# Patient Record
Sex: FEMALE | Race: BLACK OR AFRICAN AMERICAN | Employment: UNEMPLOYED | URBAN - METROPOLITAN AREA
[De-identification: names, ages, dates, MRNs, and addresses within clinical notes are randomized per-mention and may not be internally consistent; named-entity substitution may affect disease eponyms.]

---

## 2017-02-20 ENCOUNTER — HOSPITAL ENCOUNTER (OUTPATIENT)
Dept: LAB | Age: 62
Discharge: HOME | End: 2017-02-20
Payer: MEDICARE

## 2017-02-20 ENCOUNTER — OFFICE VISIT (OUTPATIENT)
Dept: FAMILY MEDICINE CLINIC | Age: 62
End: 2017-02-20

## 2017-02-20 VITALS
OXYGEN SATURATION: 98 % | DIASTOLIC BLOOD PRESSURE: 108 MMHG | TEMPERATURE: 98.2 F | HEART RATE: 78 BPM | RESPIRATION RATE: 16 BRPM | HEIGHT: 55 IN | SYSTOLIC BLOOD PRESSURE: 158 MMHG

## 2017-02-20 DIAGNOSIS — E78.5 DYSLIPIDEMIA: Chronic | ICD-10-CM

## 2017-02-20 DIAGNOSIS — I69.359 CVA, OLD, HEMIPARESIS (HCC): ICD-10-CM

## 2017-02-20 DIAGNOSIS — Z12.31 ENCOUNTER FOR MAMMOGRAM TO ESTABLISH BASELINE MAMMOGRAM: ICD-10-CM

## 2017-02-20 DIAGNOSIS — E08.9 DIABETES MELLITUS DUE TO UNDERLYING CONDITION WITHOUT COMPLICATION, UNSPECIFIED LONG TERM INSULIN USE STATUS: ICD-10-CM

## 2017-02-20 DIAGNOSIS — Z00.00 ROUTINE GENERAL MEDICAL EXAMINATION AT A HEALTH CARE FACILITY: Primary | ICD-10-CM

## 2017-02-20 DIAGNOSIS — Z13.6 SCREENING FOR CARDIOVASCULAR CONDITION: ICD-10-CM

## 2017-02-20 PROBLEM — Z71.89 ADVANCED CARE PLANNING/COUNSELING DISCUSSION: Status: ACTIVE | Noted: 2017-02-20

## 2017-02-20 LAB
ALT SERPL-CCNC: 15 U/L (ref 13–56)
ANION GAP BLD CALC-SCNC: 8 MMOL/L (ref 3–18)
AST SERPL W P-5'-P-CCNC: 9 U/L (ref 15–37)
BUN SERPL-MCNC: 12 MG/DL (ref 7–18)
BUN/CREAT SERPL: 18 (ref 12–20)
CALCIUM SERPL-MCNC: 9.4 MG/DL (ref 8.5–10.1)
CHLORIDE SERPL-SCNC: 104 MMOL/L (ref 100–108)
CHOLEST SERPL-MCNC: 236 MG/DL
CO2 SERPL-SCNC: 29 MMOL/L (ref 21–32)
CREAT SERPL-MCNC: 0.65 MG/DL (ref 0.6–1.3)
EST. AVERAGE GLUCOSE BLD GHB EST-MCNC: 137 MG/DL
GLUCOSE SERPL-MCNC: 103 MG/DL (ref 74–99)
HBA1C MFR BLD: 6.4 % (ref 4.2–5.6)
HDLC SERPL-MCNC: 83 MG/DL (ref 40–60)
HDLC SERPL: 2.8 {RATIO} (ref 0–5)
LDLC SERPL CALC-MCNC: 136.2 MG/DL (ref 0–100)
LIPID PROFILE,FLP: ABNORMAL
POTASSIUM SERPL-SCNC: 4.1 MMOL/L (ref 3.5–5.5)
SODIUM SERPL-SCNC: 141 MMOL/L (ref 136–145)
TRIGL SERPL-MCNC: 84 MG/DL (ref ?–150)
VLDLC SERPL CALC-MCNC: 16.8 MG/DL

## 2017-02-20 PROCEDURE — 80048 BASIC METABOLIC PNL TOTAL CA: CPT | Performed by: FAMILY MEDICINE

## 2017-02-20 PROCEDURE — 80061 LIPID PANEL: CPT | Performed by: FAMILY MEDICINE

## 2017-02-20 PROCEDURE — 36415 COLL VENOUS BLD VENIPUNCTURE: CPT | Performed by: FAMILY MEDICINE

## 2017-02-20 PROCEDURE — 84460 ALANINE AMINO (ALT) (SGPT): CPT | Performed by: FAMILY MEDICINE

## 2017-02-20 PROCEDURE — 84450 TRANSFERASE (AST) (SGOT): CPT | Performed by: FAMILY MEDICINE

## 2017-02-20 PROCEDURE — 83036 HEMOGLOBIN GLYCOSYLATED A1C: CPT | Performed by: FAMILY MEDICINE

## 2017-02-20 RX ORDER — MUPIROCIN CALCIUM 20 MG/G
CREAM TOPICAL 2 TIMES DAILY
Qty: 15 G | Refills: 1 | Status: SHIPPED | OUTPATIENT
Start: 2017-02-20

## 2017-02-20 RX ORDER — TRAMADOL HYDROCHLORIDE 50 MG/1
50 TABLET ORAL
Qty: 30 TAB | Refills: 3 | Status: SHIPPED | OUTPATIENT
Start: 2017-02-20 | End: 2017-09-13 | Stop reason: SDUPTHER

## 2017-02-20 RX ORDER — METOPROLOL SUCCINATE 50 MG/1
50 TABLET, EXTENDED RELEASE ORAL DAILY
Qty: 30 TAB | Refills: 6 | Status: SHIPPED | OUTPATIENT
Start: 2017-02-20 | End: 2017-04-05 | Stop reason: SDUPTHER

## 2017-02-20 NOTE — PROGRESS NOTES
1. Have you been to the ER, urgent care clinic since your last visit? Hospitalized since your last visit? No    2. Have you seen or consulted any other health care providers outside of the [address] [place] [address] since your last visit? Include any pap smears or colon screening.  No

## 2017-02-20 NOTE — ACP (ADVANCE CARE PLANNING)
Advance Care Planning (ACP) Provider Conversation Snapshot    Date of ACP Conversation: 02/20/17  Persons included in Conversation:  patient  Length of ACP Conversation in minutes:  <16 minutes (Non-Billable)    Authorized Decision Maker (if patient is incapable of making informed decisions):    This person is:   NA          For Patients with Decision Making Capacity:   MELLY BARNARD    Conversation Outcomes / Follow-Up Plan:   Recommended completion of Advance Directive form after review of ACP materials and conversation with prospective healthcare agent   Recommended communicating the plan and making copies for the healthcare agent, personal physician, and others as appropriate (e.g., health system)

## 2017-02-20 NOTE — PATIENT INSTRUCTIONS
Medicare Part B Preventive Services Limitations Recommendation Scheduled   Bone Mass Measurement  (age 72 & older, biennial) Requires diagnosis related to osteoporosis or estrogen deficiency. Biennial benefit unless patient has history of long-term glucocorticoid tx or baseline is needed because initial test was by other method Every 2 years or more frequently if medically necessary. NA         Cardiovascular Screening Blood Tests (every 5 years)  Total cholesterol, HDL, Triglycerides Order as a panel if possible Every 5 years. Done today           Colorectal Cancer Screening  -Fecal occult blood test (annual)  -Flexible sigmoidoscopy (5y)  -Screening colonoscopy (10y)  -Barium Enema Colorectal cancer screening, ages 54-65. For all patients 48 and older:  -Annual fecal occult blood test or  colonoscopy every 10 years or  -Flexible sigmoidoscopy every 5 years or  -lower endoscopy to be performed more frequently, if advised by GI. Scheduled today           Counseling to Prevent Tobacco Use (up to 8 sessions per year)  - Counseling greater than 3 and up to 10 minutes  - Counseling greater than 10 minutes Patients must be asymptomatic of tobacco-related conditions to receive as preventive service Two cessation counseling attempts (up to 8 counseling sessions) per year. Advised to quit;     Diabetes Screening Tests (at least every 3 years, Medicare covers annually or at 6-month intervals for prediabetic patients)    Fasting blood sugar (FBS) or glucose tolerance test (GTT) Patient must be diagnosed with one of the following:  -Hypertension, Dyslipidemia, obesity, previous impaired FBS or GTT  Or any two of the following: overweight, FH of diabetes, age ? 72, history of gestational diabetes, birth of baby weighing more than 9 pounds Annually or every 6 months if previous diagnosis of elevated FBS, elevated HbA1c, or impaired GTT, or glucosuria.  Done: today           Diabetes Self-Management Training (DSMT) (no USPSTF recommendation) Requires referral by treating physician for patient with diabetes or renal disease. 10 hours of initial DSMT session of no less than 30 minutes each in a continuous 12-month period. 2 hours of follow-up DSMT in subsequent years. Up to 10 hours of initial training within a continuous 12 month period of subsequent years: up to 2 hours of follow-up training each year after the initial year. NA   Glaucoma Screening (no USPSTF recommendation) Diabetes mellitus, family history, , age 48 or over,  American, age 72 or over Annually for covered beneficiaries. Done:7/2016; due 7/2017           Human Immunodeficiency Virus (HIV) Screening (annually for increased risk patients)  HIV-1 and HIV-2 by EIA, DAVID, rapid antibody test, or oral mucosa transudate Patient must be at increased risk for HIV infection per USPSTF guidelines or pregnant. Tests covered annually for patients at increased risk. Pregnant patients may receive up to 3 test during pregnancy. Annually for beneficiaries at increased risk, including anyone who asks for the test. NA   Medical Nutrition Therapy (MNT) (for diabetes or renal disease not recommended schedule) Requires referral by treating physician for patient with diabetes or renal disease. Can be provided in same year as diabetes self-management training (DSMT), and CMS recommends medical nutrition therapy take place after DSMT. Up to 3 hours for initial year and 2 hours in subsequent years. First year: 3 hours of one-on-one counseling or subsequent years: 2 hours.  NA   Prostate Cancer Screening (annually up to age 76)  - Digital rectal exam (YOGI)  - Prostate specific antigen (PSA) Annually (age 48 or over), YOGI not paid separately when covered E/M service is provided on same date Once every 12 months for patients age older than 48years of age includes: digital rectal exam and/or prostate specific antigen test. Done: NA         Seasonal Influenza Vaccination (annually)  Once per fall or winter season. Done:    10/2016       Pneumococcal Vaccination (once after 72)  Once after age 72 and if more than 5 years since last vaccination and/or uncertainty of vaccine status. Pneumococcal:      Prevnar 13: 5/2016   Hepatitis B Vaccinations (if medium/high risk) Medium/high risk factors:  End-stage renal disease,  Hemophiliacs who received Factor VIII or IX concentrates, Clients of institutions for the mentally retarded, Persons who live in the same house as a HepB virus carrier, Homosexual men, Illicit injectable drug abusers. Schedule course of vaccines if patient not previously vaccinated  *additional shots if medically necessary. NA   Screening Mammography (biennial age 54-69)? Annually (age 36 or over) Age 28 through 44: one baseline or aged 36 and older: annually. Done:    Ordered today           Screening Pap Tests and Pelvic Examination (up to age 79 and after 79 if unknown history or abnormal study last 10 years) Every 24 months except high risk Annually if at high risk for developing cervical or vaginal cancer, or childbearing, age with abnormal Pap test within past 3 years or every 2 years for women at normal risk. Done: 2015;  Due 2018           Ultrasound Screening for Abdominal Aortic Aneurysm (AAA) (once) Patient must be referred through [facility] and not have had a screening for abdominal aortic aneurysm before under Medicare. Limited to patients who meet one of the following criteria:  - Men who are 73-68 years old and have smoked more than 100 cigarettes in their lifetime.  -Anyone with a FH of AAA  -Anyone recommended for screening by USPSTF Once in a lifetime. Well Visit, Women 48 to 72: Care Instructions  Your Care Instructions  Physical exams can help you stay healthy. Your doctor has checked your overall health and may have suggested ways to take good care of yourself. He or she also may have recommended tests.  At home, you can help prevent illness with healthy eating, regular exercise, and other steps. Follow-up care is a key part of your treatment and safety. Be sure to make and go to all appointments, and call your doctor if you are having problems. It's also a good idea to know your test results and keep a list of the medicines you take. How can you care for yourself at home? · Reach and stay at a healthy weight. This will lower your risk for many problems, such as obesity, diabetes, heart disease, and high blood pressure. · Get at least 30 minutes of exercise on most days of the week. Walking is a good choice. You also may want to do other activities, such as running, swimming, cycling, or playing tennis or team sports. · Do not smoke. Smoking can make health problems worse. If you need help quitting, talk to your doctor about stop-smoking programs and medicines. These can increase your chances of quitting for good. · Protect your skin from too much sun. When you're outdoors from 10 a.m. to 4 p.m., stay in the shade or cover up with clothing and a hat with a wide brim. Wear sunglasses that block UV rays. Even when it's cloudy, put broad-spectrum sunscreen (SPF 30 or higher) on any exposed skin. · See a dentist one or two times a year for checkups and to have your teeth cleaned. · Wear a seat belt in the car. · Limit alcohol to 1 drink a day. Too much alcohol can cause health problems. Follow your doctor's advice about when to have certain tests. These tests can spot problems early. · Cholesterol. Your doctor will tell you how often to have this done based on your age, family history, or other things that can increase your risk for heart attack and stroke. · Blood pressure. Have your blood pressure checked during a routine doctor visit. Your doctor will tell you how often to check your blood pressure based on your age, your blood pressure results, and other factors.   · Mammogram. Ask your doctor how often you should have a mammogram, which is an X-ray of your breasts. A mammogram can spot breast cancer before it can be felt and when it is easiest to treat. · Pap test and pelvic exam. Ask your doctor how often you should have a Pap test. You may not need to have a Pap test as often as you used to. · Vision. Have your eyes checked every year or two or as often as your doctor suggests. Some experts recommend that you have yearly exams for glaucoma and other age-related eye problems starting at age 48. · Hearing. Tell your doctor if you notice any change in your hearing. You can have tests to find out how well you hear. · Diabetes. Ask your doctor whether you should have tests for diabetes. · Colon cancer. You should begin tests for colon cancer at age 48. You may have one of several tests. Your doctor will tell you how often to have tests based on your age and risk. Risks include whether you already had a precancerous polyp removed from your colon or whether your parents, sisters and brothers, or children have had colon cancer. · Thyroid disease. Talk to your doctor about whether to have your thyroid checked as part of a regular physical exam. Women have an increased chance of a thyroid problem. · Osteoporosis. You should begin tests for bone density at age 72. If you are younger than 72, ask your doctor whether you have factors that may increase your risk for this disease. You may want to have this test before age 72. · Heart attack and stroke risk. At least every 4 to 6 years, you should have your risk for heart attack and stroke assessed. Your doctor uses factors such as your age, blood pressure, cholesterol, and whether you smoke or have diabetes to show what your risk for a heart attack or stroke is over the next 10 years. When should you call for help? Watch closely for changes in your health, and be sure to contact your doctor if you have any problems or symptoms that concern you. Where can you learn more?   Go to http://aspen-lesa.info/. Enter E607 in the search box to learn more about \"Well Visit, Women 50 to 72: Care Instructions. \"  Current as of: July 19, 2016  Content Version: 11.1  © 9115-1715 [employer]. Care instructions adapted under license by [employer] (which disclaims liability or warranty for this information). If you have questions about a medical condition or this instruction, always ask your healthcare professional. [place] [address] any warranty or liability for your use of this information.

## 2017-02-20 NOTE — PROGRESS NOTES
This is an Initial Medicare Annual Wellness Exam (AWV) (Performed 12 months after IPPE or effective date of Medicare Part B enrollment, Once in a lifetime)    I have reviewed the patient's medical history in detail and updated the computerized patient record. Her BP is very elevated today. She has taken her BP meds. She is fasting. She is planning on losing weight. History     Past Medical History   Diagnosis Date    Arthritis     CVA (cerebral vascular accident) ([facility] [address] 11/2010     Left side paralysis    Diabetes ([facility] [address]     Dyslipidemia 11/27/2012    Hypertension     Osteoarthritis 11/1/2012    Overactive bladder 12/13/2012    Psychiatric disorder      Anxiety    Type 2 diabetes mellitus ([facility] [address] 9/12/2012      Past Surgical History   Procedure Laterality Date    Hx dilation and curettage       Current Outpatient Prescriptions   Medication Sig Dispense Refill    mupirocin calcium (BACTROBAN) 2 % topical cream Apply  to affected area two (2) times a day. 15 g 1    traMADol (ULTRAM) 50 mg tablet Take 1 Tab by mouth every six (6) hours as needed for Pain. Max Daily Amount: 200 mg. 30 Tab 3    metoprolol succinate (TOPROL-XL) 50 mg XL tablet Take 1 Tab by mouth daily. 30 Tab 6    glucose blood VI test strips (ACCU-CHEK SMARTVIEW TEST STRIP) strip Check glucose once daily Diabetes Mellitus Type 2 E11.9 100 Strip 3    Blood-Glucose Meter (ACCU-CHEK KATHYA) misc Check glucose once daily. Please dispense Accu-check Kathya Smartview Meter Diabetes Mellitus Type 2 E11.9 1 Each 1    Lancets (ACCU-CHEK FASTCLIX) misc Check glucose once daily Diabetes Mellitus Type 2 E11.9 100 Each 3    alcohol swabs (BD SINGLE USE SWABS REGULAR) padm Check glucose once daily. Diabetes Mellitus Type 2 E11.9 100 Pad 3    losartan-hydrochlorothiazide (HYZAAR) 100-25 mg per tablet Take 1 Tab by mouth daily.  30 Tab 6    atorvastatin (LIPITOR) 80 mg tablet TAKE ONE TABLET EVERY NIGHT AT BEDTIME 30 Tab 5    aspirin delayed-release 81 mg tablet TAKE ONE TABLET EVERY DAY 30 Tab 5    metFORMIN (GLUCOPHAGE) 500 mg tablet TAKE ONE TABLET EVERY DAY WITH BREAKFAST 30 Tab 5    tolterodine (DETROL) 2 mg tablet TAKE ONE TABLET TWO TIMES A DAY 60 Tab 5    PARoxetine (PAXIL) 20 mg tablet TAKE ONE TABLET EVERY DAY 30 Tab 6    Blood-Glucose Meter monitoring kit Blood sugar test daily 1 Kit 0    glucose blood VI test strips (BLOOD GLUCOSE TEST) strip Blood sugar test daily. 100 Strip 3    amLODIPine (NORVASC) 10 mg tablet TAKE ONE TABLET EVERY DAY 30 Tab 6    Lancets misc Blood sugar test daily 1 Each 11    mometasone (NASONEX) 50 mcg/actuation nasal spray 2 sprays by Both Nostrils route daily. 1 Container 3    albuterol (PROVENTIL HFA, VENTOLIN HFA) 90 mcg/actuation inhaler Take 2 Puffs by inhalation every four (4) hours as needed for Wheezing. 1 Inhaler 3    magnesium oxide (MAG-OXIDE) 400 mg tablet Take 1 Tab by mouth two (2) times a day. 15 Tab 0    acetaminophen (TYLENOL EXTRA STRENGTH) 500 mg tablet Take 1 Tab by mouth every six (6) hours as needed for Pain.  30 Tab 4     Allergies   Allergen Reactions    Other Food Other (comments)     Seafood - intolerance    Penicillins Diarrhea     Family History   Problem Relation Age of Onset    Diabetes Mother     Diabetes Sister      Social History   Substance Use Topics    Smoking status: Current Some Day Smoker     Types: Cigarettes     Last attempt to quit: 5/15/2016    Smokeless tobacco: Never Used    Alcohol use No     Patient Active Problem List   Diagnosis Code    Type 2 diabetes mellitus ([facility] [address] E11.9    Hypertension I10    Osteoarthritis M19.90    CVA (cerebrovascular accident) ([facility] [address] I63.9    Dyslipidemia E78.5    Depression F32.9    Overactive bladder N32.81    Diabetes mellitus due to underlying condition without complications ([facility] [address] M82.7    Advanced care planning/counseling discussion Z71.89         Depression Risk Factor Screening:   No flowsheet data found. Alcohol Risk Factor Screening: On any occasion during the past 3 months, have you had more than 3 drinks containing alcohol? No    Do you average more than 7 drinks per week? No    Functional Ability and Level of Safety:     Hearing Loss   none    Activities of Daily Living   Partial assistance. Requires assistance with: bathing and hygiene, grooming and dressing    Fall Risk   No flowsheet data found. Abuse Screen   Patient is not abused    Review of Systems   A comprehensive review of systems was negative except for that written in the HPI. Physical Examination     No exam data present    Evaluation of Cognitive Function:  Mood/affect:  neutral  Appearance: age appropriate  Family member/caregiver input: none      PE:  Visit Vitals    BP (!) 158/108    Pulse 78    Temp 98.2 °F (36.8 °C) (Oral)    Resp 16    Ht 4' 1\" (1.245 m)    SpO2 98%     General appearance: alert, cooperative, no distress, appears stated age  Neck: supple, symmetrical, trachea midline, no adenopathy, thyroid: not enlarged, symmetric, no tenderness/mass/nodules, no carotid bruit and no JVD  Lungs: clear to auscultation bilaterally  Heart: regular rate and rhythm, S1, S2 normal, no murmur, click, rub or gallop  Extremities: extremities normal, atraumatic, no cyanosis or edema      Patient Care Team:  Altagracia Camacho MD as PCP - General (Family Practice)  Femi Cuevas RN as Ambulatory Care Navigator    Advice/Referrals/Counseling   Education and counseling provided:  Are appropriate based on today's review and evaluation  End-of-Life planning (with patient's consent)  Screening Mammography  Cardiovascular screening blood test    Assessment/Plan       ICD-10-CM ICD-9-CM    1. Routine general medical examination at a health care facility Z00.00 V70.0    2. Screening for cardiovascular condition Z13.6 V81.2    3. Encounter for mammogram to establish baseline mammogram Z12.31 V76.12 NISH MAMMO BI SCREENING INCL CAD   4. CVA, old, hemiparesis ([facility] [address] I69.359 438.20 REFERRAL TO PHYSICAL THERAPY   . As above,   above all stable unless otherwise noted  Labs as ordered  Continue current meds as ordered  Follow-up Disposition:  Return in about 6 weeks (around 4/3/2017) for BP/toprol xl. An After Visit Summary was printed and given to the patient. This has been fully explained to the patient, who indicates understanding. Get ophthalmology records  Advanced care plan packet given  Add toprol  Per pt request at the end of the visit a PT consult was placed for pts left hemiparesis.

## 2017-02-20 NOTE — PROGRESS NOTES
Due to labs printing off on separate requisitions, orders have been entered per verbal order of Dr. Genna Coughlin  BMP, Lipid Panel, AST, ALT and Hemoglobin A1c.  Will cancel orders from 10/19/2016

## 2017-02-20 NOTE — MR AVS SNAPSHOT
Visit Information Date & Time Provider Department Dept. Phone Encounter #  
 2/20/2017 10:00 AM Josep Price, [facility] [id number] Follow-up Instructions Return in about 6 weeks (around 4/3/2017) for BP/toprol xl. Upcoming Health Maintenance Date Due  
 BREAST CANCER SCRN MAMMOGRAM 6/21/2017* COLONOSCOPY 6/21/2017* EYE EXAM RETINAL OR DILATED Q1 6/22/2017* ZOSTER VACCINE AGE 60> 6/28/2017* LIPID PANEL Q1 5/19/2017 HEMOGLOBIN A1C Q6M 8/20/2017 FOOT EXAM Q1 2/20/2018 MICROALBUMIN Q1 2/20/2018 PAP AKA CERVICAL CYTOLOGY 5/29/2018 DTaP/Tdap/Td series (2 - Td) 5/19/2026 *Topic was postponed. The date shown is not the original due date. Allergies as of 2/20/2017  Review Complete On: 2/20/2017 By: Josep Price MD  
  
 Severity Noted Reaction Type Reactions Other Food  10/29/2013    Other (comments) Seafood - intolerance Penicillins  07/02/2013    Diarrhea Current Immunizations  Reviewed on 2/20/2017 Name Date Influenza Vaccine 10/19/2015 Influenza Vaccine (Quad) PF 10/19/2016 Pneumococcal Conjugate (PCV-13) 5/19/2016 TB Skin Test (PPD) Intradermal 9/9/2013 10:40 AM, 9/3/2013  3:35 PM  
 Tdap 5/19/2016 Reviewed by Josep Price MD on 2/20/2017 at 11:05 AM  
You Were Diagnosed With   
  
 Codes Comments Routine general medical examination at a health care facility    -  Primary ICD-10-CM: Z00.00 ICD-9-CM: V70.0 Screening for cardiovascular condition     ICD-10-CM: Z13.6 ICD-9-CM: V81.2 Encounter for mammogram to establish baseline mammogram     ICD-10-CM: Z12.31 
ICD-9-CM: V76.12   
 CVA, old, hemiparesis ([facility] [address]     ICD-10-CM: K77.060 ICD-9-CM: 438.20 Vitals BP Pulse Temp Resp Height(growth percentile) SpO2  
 (!) 158/108 78 98.2 °F (36.8 °C) (Oral) 16 4' 1\" (1.245 m) 98% OB Status Smoking Status Postmenopausal Current Some Day Smoker Vitals History Preferred Pharmacy Pharmacy Name Phone [facility] [address], [address] [phone] Your Updated Medication List  
  
   
This list is accurate as of: 2/20/17 11:10 AM.  Always use your most recent med list.  
  
  
  
  
 acetaminophen 500 mg tablet Commonly known as:  [address] Brandt Jr Sabrina Byrnes Take 1 Tab by mouth every six (6) hours as needed for Pain. albuterol 90 mcg/actuation inhaler Commonly known as:  PROVENTIL HFA, VENTOLIN HFA, PROAIR HFA Take 2 Puffs by inhalation every four (4) hours as needed for Wheezing. alcohol swabs Padm Commonly known as:  BD Single Use Swabs Regular Check glucose once daily. Diabetes Mellitus Type 2 E11.9  
  
 amLODIPine 10 mg tablet Commonly known as:  Elyn Coffer TAKE ONE TABLET EVERY DAY  
  
 aspirin delayed-release 81 mg tablet TAKE ONE TABLET EVERY DAY  
  
 atorvastatin 80 mg tablet Commonly known as:  LIPITOR  
TAKE ONE TABLET EVERY NIGHT AT BEDTIME * Blood-Glucose Meter monitoring kit Blood sugar test daily * Blood-Glucose Meter Misc Commonly known as:  Esperanza Rajan Check glucose once daily. Please dispense Accu-check Renetta Smartview Meter Diabetes Mellitus Type 2 E11.9  
  
 * glucose blood VI test strips strip Commonly known as:  blood glucose test  
Blood sugar test daily. * glucose blood VI test strips strip Commonly known as:  [address] Check glucose once daily Diabetes Mellitus Type 2 E11.9 * Lancets Misc Blood sugar test daily * Lancets Misc Commonly known as:  ACCU-CHEK FASTCLIX Check glucose once daily Diabetes Mellitus Type 2 E11.9  
  
 losartan-hydroCHLOROthiazide 100-25 mg per tablet Commonly known as:  HYZAAR Take 1 Tab by mouth daily. magnesium oxide 400 mg tablet Commonly known as:  MAG-OXIDE Take 1 Tab by mouth two (2) times a day. metFORMIN 500 mg tablet Commonly known as:  GLUCOPHAGE  
TAKE ONE TABLET EVERY DAY WITH BREAKFAST  
  
 metoprolol succinate 50 mg XL tablet Commonly known as:  TOPROL-XL Take 1 Tab by mouth daily. mometasone 50 mcg/actuation nasal spray Commonly known as:  NASONEX  
2 sprays by Both Nostrils route daily. mupirocin calcium 2 % topical cream  
Commonly known as:  [facility] Apply  to affected area two (2) times a day. PARoxetine 20 mg tablet Commonly known as:  PAXIL TAKE ONE TABLET EVERY DAY  
  
 tolterodine 2 mg tablet Commonly known as:  DETROL  
TAKE ONE TABLET TWO TIMES A DAY  
  
 traMADol 50 mg tablet Commonly known as:  ULTRAM  
Take 1 Tab by mouth every six (6) hours as needed for Pain. Max Daily Amount: 200 mg.  
  
 * Notice: This list has 6 medication(s) that are the same as other medications prescribed for you. Read the directions carefully, and ask your doctor or other care provider to review them with you. Prescriptions Printed Refills  
 traMADol (ULTRAM) 50 mg tablet 3 Sig: Take 1 Tab by mouth every six (6) hours as needed for Pain. Max Daily Amount: 200 mg. Class: Print Route: Oral  
  
Prescriptions Sent to Pharmacy Refills  
 mupirocin calcium (BACTROBAN) 2 % topical cream 1 Sig: Apply  to affected area two (2) times a day. Class: Normal  
 Pharmacy: [facility] [address] [place] [address] [place]  Ph #: [phone] Route: Topical  
 metoprolol succinate (TOPROL-XL) 50 mg XL tablet 6 Sig: Take 1 Tab by mouth daily. Class: Normal  
 Pharmacy: [facility] [address] [place] [address] [place]  Ph #: [phone] Route: Oral  
  
We Performed the Following REFERRAL TO PHYSICAL THERAPY [MGI97 Custom] Comments:  
 Please evaluate patient for  left sided hemiparesis - s/p stroke. Follow-up Instructions Return in about 6 weeks (around 4/3/2017) for BP/toprol xl.   
  
To-Do List   
 02/20/2017 Imaging:  [facility] MAMMO BI SCREENING INCL CAD Referral Information Referral ID Referred By Referred To  
  
 [id number] [facility] [address], [address] [place], [address] Phone: [phone] Visits Status Start Date End Date 1 New Request 2/20/17 2/20/18 If your referral has a status of pending review or denied, additional information will be sent to support the outcome of this decision. Patient Instructions Medicare Part B Preventive Services Limitations Recommendation Scheduled Bone Mass Measurement 
(age 72 & older, biennial) Requires diagnosis related to osteoporosis or estrogen deficiency. Biennial benefit unless patient has history of long-term glucocorticoid tx or baseline is needed because initial test was by other method Every 2 years or more frequently if medically necessary. NA Cardiovascular Screening Blood Tests (every 5 years) Total cholesterol, HDL, Triglycerides Order as a panel if possible Every 5 years. Done today Colorectal Cancer Screening 
-Fecal occult blood test (annual) -Flexible sigmoidoscopy (5y) 
-Screening colonoscopy (10y) -Barium Enema Colorectal cancer screening, ages 54-65. For all patients 48 and older: 
-Annual fecal occult blood test or 
colonoscopy every 10 years or 
-Flexible sigmoidoscopy every 5 years or 
-lower endoscopy to be performed more frequently, if advised by GI. Scheduled today Counseling to Prevent Tobacco Use (up to 8 sessions per year) - Counseling greater than 3 and up to 10 minutes - Counseling greater than 10 minutes Patients must be asymptomatic of tobacco-related conditions to receive as preventive service Two cessation counseling attempts (up to 8 counseling sessions) per year.  Advised to quit;    
Diabetes Screening Tests (at least every 3 years, Medicare covers annually or at 6-month intervals for prediabetic patients) Fasting blood sugar (FBS) or glucose tolerance test (GTT) Patient must be diagnosed with one of the following: 
-Hypertension, Dyslipidemia, obesity, previous impaired FBS or GTT 
Or any two of the following: overweight, FH of diabetes, age ? 72, history of gestational diabetes, birth of baby weighing more than 9 pounds Annually or every 6 months if previous diagnosis of elevated FBS, elevated HbA1c, or impaired GTT, or glucosuria. Done: today Diabetes Self-Management Training (DSMT) (no USPSTF recommendation) Requires referral by treating physician for patient with diabetes or renal disease. 10 hours of initial DSMT session of no less than 30 minutes each in a continuous 12-month period. 2 hours of follow-up DSMT in subsequent years. Up to 10 hours of initial training within a continuous 12 month period of subsequent years: up to 2 hours of follow-up training each year after the initial year. NA Glaucoma Screening (no USPSTF recommendation) Diabetes mellitus, family history, , age 48 or over,  American, age 72 or over Annually for covered beneficiaries. Done:7/2016; due 7/2017 Human Immunodeficiency Virus (HIV) Screening (annually for increased risk patients) HIV-1 and HIV-2 by EIA, DAVID, rapid antibody test, or oral mucosa transudate Patient must be at increased risk for HIV infection per USPSTF guidelines or pregnant. Tests covered annually for patients at increased risk. Pregnant patients may receive up to 3 test during pregnancy. Annually for beneficiaries at increased risk, including anyone who asks for the test. NA Medical Nutrition Therapy (MNT) (for diabetes or renal disease not recommended schedule) Requires referral by treating physician for patient with diabetes or renal disease.   Can be provided in same year as diabetes self-management training (DSMT), and CMS recommends medical nutrition therapy take place after DSMT. Up to 3 hours for initial year and 2 hours in subsequent years. First year: 3 hours of one-on-one counseling or subsequent years: 2 hours. NA Prostate Cancer Screening (annually up to age 76) - Digital rectal exam (YOGI) - Prostate specific antigen (PSA) Annually (age 48 or over), YOGI not paid separately when covered E/M service is provided on same date Once every 12 months for patients age older than 48years of age includes: digital rectal exam and/or prostate specific antigen test. Done: NA Seasonal Influenza Vaccination (annually)  Once per fall or winter season. Done: 
 
10/2016 Pneumococcal Vaccination (once after 72)  Once after age 72 and if more than 5 years since last vaccination and/or uncertainty of vaccine status. Pneumococcal: 
 
 
Prevnar 13: 5/2016 Hepatitis B Vaccinations (if medium/high risk) Medium/high risk factors:  End-stage renal disease, Hemophiliacs who received Factor VIII or IX concentrates, Clients of institutions for the mentally retarded, Persons who live in the same house as a HepB virus carrier, Homosexual men, Illicit injectable drug abusers. Schedule course of vaccines if patient not previously vaccinated *additional shots if medically necessary. NA Screening Mammography (biennial age 54-69)? Annually (age 36 or over) Age 28 through 44: one baseline or aged 36 and older: annually. Done: 
 
Ordered today Screening Pap Tests and Pelvic Examination (up to age 79 and after 79 if unknown history or abnormal study last 10 years) Every 24 months except high risk Annually if at high risk for developing cervical or vaginal cancer, or childbearing, age with abnormal Pap test within past 3 years or every 2 years for women at normal risk. Done: 2015; 
Due 2018 Ultrasound Screening for Abdominal Aortic Aneurysm (AAA) (once) Patient must be referred through IPPE and not have had a screening for abdominal aortic aneurysm before under Medicare. Limited to patients who meet one of the following criteria: 
- Men who are 73-68 years old and have smoked more than 100 cigarettes in their lifetime. 
-Anyone with a FH of AAA 
-Anyone recommended for screening by USPSTF Once in a lifetime. Well Visit, Women 48 to 72: Care Instructions Your Care Instructions Physical exams can help you stay healthy. Your doctor has checked your overall health and may have suggested ways to take good care of yourself. He or she also may have recommended tests. At home, you can help prevent illness with healthy eating, regular exercise, and other steps. Follow-up care is a key part of your treatment and safety. Be sure to make and go to all appointments, and call your doctor if you are having problems. It's also a good idea to know your test results and keep a list of the medicines you take. How can you care for yourself at home? · Reach and stay at a healthy weight. This will lower your risk for many problems, such as obesity, diabetes, heart disease, and high blood pressure. · Get at least 30 minutes of exercise on most days of the week. Walking is a good choice. You also may want to do other activities, such as running, swimming, cycling, or playing tennis or team sports. · Do not smoke. Smoking can make health problems worse. If you need help quitting, talk to your doctor about stop-smoking programs and medicines. These can increase your chances of quitting for good. · Protect your skin from too much sun. When you're outdoors from 10 a.m. to 4 p.m., stay in the shade or cover up with clothing and a hat with a wide brim. Wear sunglasses that block UV rays. Even when it's cloudy, put broad-spectrum sunscreen (SPF 30 or higher) on any exposed skin. · See a dentist one or two times a year for checkups and to have your teeth cleaned. · Wear a seat belt in the car. · Limit alcohol to 1 drink a day. Too much alcohol can cause health problems. Follow your doctor's advice about when to have certain tests. These tests can spot problems early. · Cholesterol. Your doctor will tell you how often to have this done based on your age, family history, or other things that can increase your risk for heart attack and stroke. · Blood pressure. Have your blood pressure checked during a routine doctor visit. Your doctor will tell you how often to check your blood pressure based on your age, your blood pressure results, and other factors. · Mammogram. Ask your doctor how often you should have a mammogram, which is an X-ray of your breasts. A mammogram can spot breast cancer before it can be felt and when it is easiest to treat. · Pap test and pelvic exam. Ask your doctor how often you should have a Pap test. You may not need to have a Pap test as often as you used to. · Vision. Have your eyes checked every year or two or as often as your doctor suggests. Some experts recommend that you have yearly exams for glaucoma and other age-related eye problems starting at age 48. · Hearing. Tell your doctor if you notice any change in your hearing. You can have tests to find out how well you hear. · Diabetes. Ask your doctor whether you should have tests for diabetes. · Colon cancer. You should begin tests for colon cancer at age 48. You may have one of several tests. Your doctor will tell you how often to have tests based on your age and risk. Risks include whether you already had a precancerous polyp removed from your colon or whether your parents, sisters and brothers, or children have had colon cancer. · Thyroid disease. Talk to your doctor about whether to have your thyroid checked as part of a regular physical exam. Women have an increased chance of a thyroid problem. · Osteoporosis. You should begin tests for bone density at age 72.  If you are younger than 72, ask your doctor whether you have factors that may increase your risk for this disease. You may want to have this test before age 72. · Heart attack and stroke risk. At least every 4 to 6 years, you should have your risk for heart attack and stroke assessed. Your doctor uses factors such as your age, blood pressure, cholesterol, and whether you smoke or have diabetes to show what your risk for a heart attack or stroke is over the next 10 years. When should you call for help? Watch closely for changes in your health, and be sure to contact your doctor if you have any problems or symptoms that concern you. Where can you learn more? Go to http://aspen-lesa.info/. Enter B325 in the search box to learn more about \"Well Visit, Women 50 to 72: Care Instructions. \" Current as of: July 19, 2016 Content Version: 11.1 © 9334-7136 [employer]jit. Care instructions adapted under license by [employer] (which disclaims liability or warranty for this information). If you have questions about a medical condition or this instruction, always ask your healthcare professional. [address] any warranty or liability for your use of this information. Introducing [facility]! Yvette Giron introduces [employer] patient portal. Now you can access parts of your medical record, email your doctor's office, and request medication refills online. 1. In your internet browser, go to [email or web address] 2. Click on the First Time User? Click Here link in the Sign In box. You will see the New Member Sign Up page. 3. Enter your [employer] Access Code exactly as it appears below. You will not need to use this code after youve completed the sign-up process. If you do not sign up before the expiration date, you must request a new code. · [employer] Access Code: Naomi Hood 23 Expires: 5/21/2017 11:04 AM 
 
 4. Enter the last four digits of your Social Security Number (xxxx) and Date of Birth (mm/dd/yyyy) as indicated and click Submit. You will be taken to the next sign-up page. 5. Create a [facility] [employer] ID. This will be your [facility] [facility] login ID and cannot be changed, so think of one that is secure and easy to remember. 6. Create a [employer] password. You can change your password at any time. 7. Enter your Password Reset Question and Answer. This can be used at a later time if you forget your password. 8. Enter your e-mail address. You will receive e-mail notification when new information is available in [address] 9. Click Sign Up. You can now view and download portions of your medical record. 10. Click the Download Summary menu link to download a portable copy of your medical information. If you have questions, please visit the Frequently Asked Questions section of the [employer] website. Remember, [facility] is NOT to be used for urgent needs. For medical emergencies, dial 911. Now available from your iPhone and Android! Please provide this summary of care documentation to your next provider. Your primary care clinician is listed as [address]. If you have any questions after today's visit, please call [phone].

## 2017-02-21 DIAGNOSIS — N32.81 OAB (OVERACTIVE BLADDER): ICD-10-CM

## 2017-02-23 RX ORDER — ATORVASTATIN CALCIUM 80 MG/1
TABLET, FILM COATED ORAL
Qty: 30 TAB | Refills: 4 | Status: SHIPPED | OUTPATIENT
Start: 2017-02-23 | End: 2017-09-28 | Stop reason: ALTCHOICE

## 2017-02-23 RX ORDER — METFORMIN HYDROCHLORIDE 500 MG/1
TABLET ORAL
Qty: 30 TAB | Refills: 4 | Status: SHIPPED | OUTPATIENT
Start: 2017-02-23 | End: 2017-04-05 | Stop reason: ALTCHOICE

## 2017-03-01 ENCOUNTER — HOSPITAL ENCOUNTER (OUTPATIENT)
Dept: MAMMOGRAPHY | Age: 62
Discharge: HOME | End: 2017-03-01
Attending: FAMILY MEDICINE
Payer: MEDICARE

## 2017-03-01 DIAGNOSIS — Z12.31 ENCOUNTER FOR MAMMOGRAM TO ESTABLISH BASELINE MAMMOGRAM: ICD-10-CM

## 2017-03-01 PROCEDURE — 77067 SCR MAMMO BI INCL CAD: CPT

## 2017-03-25 DIAGNOSIS — N32.81 OAB (OVERACTIVE BLADDER): ICD-10-CM

## 2017-03-27 RX ORDER — TOLTERODINE TARTRATE 2 MG/1
TABLET, EXTENDED RELEASE ORAL
Qty: 60 TAB | Refills: 4 | Status: SHIPPED | OUTPATIENT
Start: 2017-03-27 | End: 2017-06-28 | Stop reason: SDUPTHER

## 2017-04-05 ENCOUNTER — OFFICE VISIT (OUTPATIENT)
Dept: FAMILY MEDICINE CLINIC | Age: 62
End: 2017-04-05

## 2017-04-05 VITALS
DIASTOLIC BLOOD PRESSURE: 90 MMHG | HEIGHT: 55 IN | SYSTOLIC BLOOD PRESSURE: 180 MMHG | HEART RATE: 81 BPM | OXYGEN SATURATION: 98 % | TEMPERATURE: 98.2 F | RESPIRATION RATE: 16 BRPM

## 2017-04-05 DIAGNOSIS — I10 HTN (HYPERTENSION), BENIGN: Primary | ICD-10-CM

## 2017-04-05 RX ORDER — METOPROLOL SUCCINATE 100 MG/1
100 TABLET, EXTENDED RELEASE ORAL DAILY
Qty: 30 TAB | Refills: 6 | Status: SHIPPED | OUTPATIENT
Start: 2017-04-05 | End: 2017-09-28 | Stop reason: SDUPTHER

## 2017-04-05 NOTE — PROGRESS NOTES
HISTORY OF PRESENT ILLNESS  Anastasia Collet is a 64 y.o. female. HPI  Patient is here today for follow up on: Hypertension    Hypertension:  BP remains elevated today. She feels tired; she has tolerated the new toprol XL recently. She states that she has been feeling \" real good\"       Current Outpatient Prescriptions:     metoprolol succinate (TOPROL-XL) 100 mg tablet, Take 1 Tab by mouth daily. , Disp: 30 Tab, Rfl: 6    tolterodine (DETROL) 2 mg tablet, TAKE ONE TABLET TWO TIMES A DAY, Disp: 60 Tab, Rfl: 4    atorvastatin (LIPITOR) 80 mg tablet, TAKE ONE TABLET EVERY NIGHT AT BEDTIME, Disp: 30 Tab, Rfl: 4    mupirocin calcium (BACTROBAN) 2 % topical cream, Apply  to affected area two (2) times a day., Disp: 15 g, Rfl: 1    traMADol (ULTRAM) 50 mg tablet, Take 1 Tab by mouth every six (6) hours as needed for Pain. Max Daily Amount: 200 mg., Disp: 30 Tab, Rfl: 3    glucose blood VI test strips (ACCU-CHEK SMARTVIEW TEST STRIP) strip, Check glucose once daily Diabetes Mellitus Type 2 E11.9, Disp: 100 Strip, Rfl: 3    Blood-Glucose Meter (ACCU-CHEK KATHYA) misc, Check glucose once daily. Please dispense Accu-check Kathya Smartview Meter Diabetes Mellitus Type 2 E11.9, Disp: 1 Each, Rfl: 1    Lancets (ACCU-CHEK FASTCLIX) misc, Check glucose once daily Diabetes Mellitus Type 2 E11.9, Disp: 100 Each, Rfl: 3    alcohol swabs (BD SINGLE USE SWABS REGULAR) padm, Check glucose once daily. Diabetes Mellitus Type 2 E11.9, Disp: 100 Pad, Rfl: 3    losartan-hydrochlorothiazide (HYZAAR) 100-25 mg per tablet, Take 1 Tab by mouth daily. , Disp: 30 Tab, Rfl: 6    aspirin delayed-release 81 mg tablet, TAKE ONE TABLET EVERY DAY, Disp: 30 Tab, Rfl: 5    metFORMIN (GLUCOPHAGE) 500 mg tablet, TAKE ONE TABLET EVERY DAY WITH BREAKFAST, Disp: 30 Tab, Rfl: 5    PARoxetine (PAXIL) 20 mg tablet, TAKE ONE TABLET EVERY DAY, Disp: 30 Tab, Rfl: 6    Blood-Glucose Meter monitoring kit, Blood sugar test daily, Disp: 1 Kit, Rfl: 0    glucose blood VI test strips (BLOOD GLUCOSE TEST) strip, Blood sugar test daily. , Disp: 100 Strip, Rfl: 3    amLODIPine (NORVASC) 10 mg tablet, TAKE ONE TABLET EVERY DAY, Disp: 30 Tab, Rfl: 6    Lancets misc, Blood sugar test daily, Disp: 1 Each, Rfl: 11    mometasone (NASONEX) 50 mcg/actuation nasal spray, 2 sprays by Both Nostrils route daily. , Disp: 1 Container, Rfl: 3    albuterol (PROVENTIL HFA, VENTOLIN HFA) 90 mcg/actuation inhaler, Take 2 Puffs by inhalation every four (4) hours as needed for Wheezing., Disp: 1 Inhaler, Rfl: 3    magnesium oxide (MAG-OXIDE) 400 mg tablet, Take 1 Tab by mouth two (2) times a day., Disp: 15 Tab, Rfl: 0    acetaminophen (TYLENOL EXTRA STRENGTH) 500 mg tablet, Take 1 Tab by mouth every six (6) hours as needed for Pain., Disp: 30 Tab, Rfl: 4    Review of Systems   Constitutional: Negative for chills and fever. Cardiovascular: Negative for chest pain and leg swelling. Skin: Positive for itching (in back). Physical Exam   Visit Vitals    /90    Pulse 81    Temp 98.2 °F (36.8 °C) (Oral)    Resp 16    Ht 4' 1\" (1.245 m)    SpO2 98%     General appearance: alert, cooperative, no distress, appears stated age  Lungs: clear to auscultation bilaterally  Heart: regular rate and rhythm, S1, S2 normal, no murmur, click, rub or gallop  Extremities: extremities normal, atraumatic, no cyanosis or edema      ASSESSMENT and PLAN    ICD-10-CM ICD-9-CM    1. HTN (hypertension), benign I10 401.1 metoprolol succinate (TOPROL-XL) 100 mg tablet     As above, not controlled  Increase metoprolol xl to 100 mg daily  Consider keeping a  BP log  Low sodium diet dvised  Follow-up Disposition:  Return in about 6 weeks (around 5/17/2017) for htn. An After Visit Summary was printed and given to the patient. This has been fully explained to the patient, who indicates understanding.

## 2017-04-05 NOTE — MR AVS SNAPSHOT
Visit Information Date & Time Provider Department Dept. Phone Encounter #  
 4/5/2017 10:40 AM Marybeth WilhelmSang [phone] Follow-up Instructions Return in about 6 weeks (around 5/17/2017) for htn. Upcoming Health Maintenance Date Due COLONOSCOPY 6/21/2017* ZOSTER VACCINE AGE 60> 6/28/2017* EYE EXAM RETINAL OR DILATED Q1 7/14/2017 HEMOGLOBIN A1C Q6M 8/20/2017 FOOT EXAM Q1 2/20/2018 MICROALBUMIN Q1 2/20/2018 LIPID PANEL Q1 2/20/2018 PAP AKA CERVICAL CYTOLOGY 5/29/2018 BREAST CANCER SCRN MAMMOGRAM 3/1/2019 DTaP/Tdap/Td series (2 - Td) 5/19/2026 *Topic was postponed. The date shown is not the original due date. Allergies as of 4/5/2017  Review Complete On: 4/5/2017 By: Marybeth Wilhelm MD  
  
 Severity Noted Reaction Type Reactions Other Food  10/29/2013    Other (comments) Seafood - intolerance Penicillins  07/02/2013    Diarrhea Current Immunizations  Reviewed on 2/20/2017 Name Date Influenza Vaccine 10/19/2015 Influenza Vaccine (Quad) PF 10/19/2016 Pneumococcal Conjugate (PCV-13) 5/19/2016 TB Skin Test (PPD) Intradermal 9/9/2013 10:40 AM, 9/3/2013  3:35 PM  
 Tdap 5/19/2016 Not reviewed this visit You Were Diagnosed With   
  
 Codes Comments HTN (hypertension), benign    -  Primary ICD-10-CM: I10 
ICD-9-CM: 401.1 Vitals BP Pulse Temp Resp Height(growth percentile) SpO2  
 180/90 81 98.2 °F (36.8 °C) (Oral) 16 4' 1\" (1.245 m) 98% OB Status Smoking Status Postmenopausal Current Some Day Smoker Vitals History Preferred Pharmacy Pharmacy Name Phone [facility], [address] [phone] Your Updated Medication List  
  
   
This list is accurate as of: 4/5/17 11:15 AM.  Always use your most recent med list.  
  
  
  
  
 acetaminophen 500 mg tablet Commonly known as:  [address] Take 1 Tab by mouth every six (6) hours as needed for Pain. albuterol 90 mcg/actuation inhaler Commonly known as:  PROVENTIL HFA, VENTOLIN HFA, PROAIR HFA Take 2 Puffs by inhalation every four (4) hours as needed for Wheezing. alcohol swabs Padm Commonly known as:  BD Single Use Swabs Regular Check glucose once daily. Diabetes Mellitus Type 2 E11.9  
  
 amLODIPine 10 mg tablet Commonly known as:  Mayela Half TAKE ONE TABLET EVERY DAY  
  
 aspirin delayed-release 81 mg tablet TAKE ONE TABLET EVERY DAY  
  
 atorvastatin 80 mg tablet Commonly known as:  LIPITOR  
TAKE ONE TABLET EVERY NIGHT AT BEDTIME * Blood-Glucose Meter monitoring kit Blood sugar test daily * Blood-Glucose Meter Misc Commonly known as:  Kaitlynn Hawkins Check glucose once daily. Please dispense Accu-check Renetta Smartview Meter Diabetes Mellitus Type 2 E11.9  
  
 * glucose blood VI test strips strip Commonly known as:  blood glucose test  
Blood sugar test daily. * glucose blood VI test strips strip Commonly known as:  [address] Check glucose once daily Diabetes Mellitus Type 2 E11.9 * Lancets Misc Blood sugar test daily * Lancets Misc Commonly known as:  ACCU-CHEK FASTCLIX Check glucose once daily Diabetes Mellitus Type 2 E11.9  
  
 losartan-hydroCHLOROthiazide 100-25 mg per tablet Commonly known as:  HYZAAR Take 1 Tab by mouth daily. magnesium oxide 400 mg tablet Commonly known as:  MAG-OXIDE Take 1 Tab by mouth two (2) times a day. metFORMIN 500 mg tablet Commonly known as:  GLUCOPHAGE  
TAKE ONE TABLET EVERY DAY WITH BREAKFAST  
  
 metoprolol succinate 100 mg tablet Commonly known as:  TOPROL-XL Take 1 Tab by mouth daily. mometasone 50 mcg/actuation nasal spray Commonly known as:  NASONEX  
2 sprays by Both Nostrils route daily.   
  
 mupirocin calcium 2 % topical cream  
 Commonly known as:  [facility] [employer] Apply  to affected area two (2) times a day. PARoxetine 20 mg tablet Commonly known as:  PAXIL TAKE ONE TABLET EVERY DAY  
  
 tolterodine 2 mg tablet Commonly known as:  DETROL  
TAKE ONE TABLET TWO TIMES A DAY  
  
 traMADol 50 mg tablet Commonly known as:  ULTRAM  
Take 1 Tab by mouth every six (6) hours as needed for Pain. Max Daily Amount: 200 mg.  
  
 * Notice: This list has 6 medication(s) that are the same as other medications prescribed for you. Read the directions carefully, and ask your doctor or other care provider to review them with you. Prescriptions Sent to Pharmacy Refills  
 metoprolol succinate (TOPROL-XL) 100 mg tablet 6 Sig: Take 1 Tab by mouth daily. Class: Normal  
 Pharmacy: [facility], [facility] [address]  Ph #: [phone] Route: Oral  
  
Follow-up Instructions Return in about 6 weeks (around 5/17/2017) for htn. Patient Instructions Low Sodium Diet (2,000 Milligram): Care Instructions Your Care Instructions Too much sodium causes your body to hold on to extra water. This can raise your blood pressure and force your heart and kidneys to work harder. In very serious cases, this could cause you to be put in the hospital. It might even be life-threatening. By limiting sodium, you will feel better and lower your risk of serious problems. The most common source of sodium is salt. People get most of the salt in their diet from canned, prepared, and packaged foods. Fast food and restaurant meals also are very high in sodium. Your doctor will probably limit your sodium to less than 2,000 milligrams (mg) a day. This limit counts all the sodium in prepared and packaged foods and any salt you add to your food. Follow-up care is a key part of your treatment and safety.  Be sure to make and go to all appointments, and call your doctor if you are having problems. It's also a good idea to know your test results and keep a list of the medicines you take. How can you care for yourself at home? Read food labels · Read labels on cans and food packages. The labels tell you how much sodium is in each serving. Make sure that you look at the serving size. If you eat more than the serving size, you have eaten more sodium. · Food labels also tell you the Percent Daily Value for sodium. Choose products with low Percent Daily Values for sodium. · Be aware that sodium can come in forms other than salt, including monosodium glutamate (MSG), sodium citrate, and sodium bicarbonate (baking soda). MSG is often added to Asian food. When you eat out, you can sometimes ask for food without MSG or added salt. Buy low-sodium foods · Buy foods that are labeled \"unsalted\" (no salt added), \"sodium-free\" (less than 5 mg of sodium per serving), or \"low-sodium\" (less than 140 mg of sodium per serving). Foods labeled \"reduced-sodium\" and \"light sodium\" may still have too much sodium. Be sure to read the label to see how much sodium you are getting. · Buy fresh vegetables, or frozen vegetables without added sauces. Buy low-sodium versions of canned vegetables, soups, and other canned goods. Prepare low-sodium meals · Cut back on the amount of salt you use in cooking. This will help you adjust to the taste. Do not add salt after cooking. One teaspoon of salt has about 2,300 mg of sodium. · Take the salt shaker off the table. · Flavor your food with garlic, lemon juice, onion, vinegar, herbs, and spices. Do not use soy sauce, lite soy sauce, steak sauce, onion salt, garlic salt, celery salt, mustard, or ketchup on your food. · Use low-sodium salad dressings, sauces, and ketchup. Or make your own salad dressings and sauces without adding salt. · Use less salt (or none) when recipes call for it.  You can often use half the salt a recipe calls for without losing flavor. Other foods such as rice, pasta, and grains do not need added salt. · Rinse canned vegetables, and cook them in fresh water. This removes somebut not allof the salt. · Avoid water that is naturally high in sodium or that has been treated with water softeners, which add sodium. Call your local water company to find out the sodium content of your water supply. If you buy bottled water, read the label and choose a sodium-free brand. Avoid high-sodium foods · Avoid eating: ¨ Smoked, cured, salted, and canned meat, fish, and poultry. ¨ Ham, denis, hot dogs, and luncheon meats. ¨ Regular, hard, and processed cheese and regular peanut butter. ¨ Crackers with salted tops, and other salted snack foods such as pretzels, chips, and salted popcorn. ¨ Frozen prepared meals, unless labeled low-sodium. ¨ Canned and dried soups, broths, and bouillon, unless labeled sodium-free or low-sodium. ¨ Canned vegetables, unless labeled sodium-free or low-sodium. ¨ Western [place]atha fries, pizza, tacos, and other fast foods. ¨ Pickles, olives, ketchup, and other condiments, especially soy sauce, unless labeled sodium-free or low-sodium. Where can you learn more? Go to http://aspen-lesa.info/. Enter T842 in the search box to learn more about \"Low Sodium Diet (2,000 Milligram): Care Instructions. \" Current as of: July 26, 2016 Content Version: 11.2 © 7284-0079 [employer]. Care instructions adapted under license by [employer] (which disclaims liability or warranty for this information). If you have questions about a medical condition or this instruction, always ask your healthcare professional. [address] any warranty or liability for your use of this information. Introducing [facility]!    
 Pablito Kohler introduces Inno[employer] patient portal. Now you can access parts of your medical record, email your doctor's office, and request medication refills online. 1. In your internet browser, go to [email or web address] 2. Click on the First Time User? Click Here link in the Sign In box. You will see the New Member Sign Up page. 3. Enter your [employer] Access Code exactly as it appears below. You will not need to use this code after youve completed the sign-up process. If you do not sign up before the expiration date, you must request a new code. · [employer] Access Code: Naomi Hood 23 Expires: 5/21/2017 12:04 PM 
 
4. Enter the last four digits of your Social Security Number (xxxx) and Date of Birth (mm/dd/yyyy) as indicated and click Submit. You will be taken to the next sign-up page. 5. Create a [employer] Ideas ID. This will be your [employer] login ID and cannot be changed, so think of one that is secure and easy to remember. 6. Create a [employer] password. You can change your password at any time. 7. Enter your Password Reset Question and Answer. This can be used at a later time if you forget your password. 8. Enter your e-mail address. You will receive e-mail notification when new information is available in [address] 9. Click Sign Up. You can now view and download portions of your medical record. 10. Click the Download Summary menu link to download a portable copy of your medical information. If you have questions, please visit the Frequently Asked Questions section of the [employer] website. Remember, [employer] is NOT to be used for urgent needs. For medical emergencies, dial 911. Now available from your iPhone and Android! Please provide this summary of care documentation to your next provider. Your primary care clinician is listed as [address]. If you have any questions after today's visit, please call [phone].

## 2017-04-05 NOTE — PATIENT INSTRUCTIONS

## 2017-04-29 DIAGNOSIS — N32.81 OAB (OVERACTIVE BLADDER): ICD-10-CM

## 2017-04-29 RX ORDER — TOLTERODINE TARTRATE 2 MG/1
TABLET, EXTENDED RELEASE ORAL
Qty: 60 TAB | Refills: 4 | Status: SHIPPED | OUTPATIENT
Start: 2017-04-29 | End: 2017-09-28 | Stop reason: SDUPTHER

## 2017-04-29 RX ORDER — PAROXETINE HYDROCHLORIDE 20 MG/1
TABLET, FILM COATED ORAL
Qty: 30 TAB | Refills: 5 | Status: SHIPPED | OUTPATIENT
Start: 2017-04-29 | End: 2017-09-28 | Stop reason: ALTCHOICE

## 2017-05-10 ENCOUNTER — TELEPHONE (OUTPATIENT)
Dept: FAMILY MEDICINE CLINIC | Age: 62
End: 2017-05-10

## 2017-05-10 NOTE — TELEPHONE ENCOUNTER
Pt called stating that [facility] sent over paperwork so she can get wheelchair fixed because the wheel is broken and pt is unable to go to appointments and out of the house. Pt calling to make sure provider received form and to let her know its urgent.

## 2017-06-28 ENCOUNTER — OFFICE VISIT (OUTPATIENT)
Dept: FAMILY MEDICINE CLINIC | Age: 62
End: 2017-06-28

## 2017-06-28 VITALS
DIASTOLIC BLOOD PRESSURE: 92 MMHG | TEMPERATURE: 98 F | SYSTOLIC BLOOD PRESSURE: 166 MMHG | HEART RATE: 72 BPM | RESPIRATION RATE: 16 BRPM | OXYGEN SATURATION: 98 % | HEIGHT: 55 IN

## 2017-06-28 DIAGNOSIS — I10 ESSENTIAL HYPERTENSION: Primary | ICD-10-CM

## 2017-06-28 RX ORDER — ALBUTEROL SULFATE 90 UG/1
2 AEROSOL, METERED RESPIRATORY (INHALATION)
Qty: 1 INHALER | Refills: 3 | Status: SHIPPED | OUTPATIENT
Start: 2017-06-28 | End: 2017-12-07 | Stop reason: SDUPTHER

## 2017-06-28 NOTE — PROGRESS NOTES
1. Have you been to the ER, urgent care clinic since your last visit? Hospitalized since your last visit? No    2. Have you seen or consulted any other health care providers outside of the [address] [place] [address] since your last visit? Include any pap smears or colon screening.  No

## 2017-06-28 NOTE — PROGRESS NOTES
HISTORY OF PRESENT ILLNESS  Chad Harrington is a 64 y.o. female. HPI  Patient is here today for evaluation and treatment of: Hypertension    Hypertension:  BP is up today. She has been taking her medicine daily. She is on meds as listed below. Pt also has elevated cholesterol ; She still smokes; Pt advised to quit. Current Outpatient Prescriptions:     albuterol (PROVENTIL HFA, VENTOLIN HFA, PROAIR HFA) 90 mcg/actuation inhaler, Take 2 Puffs by inhalation every four (4) hours as needed for Wheezing., Disp: 1 Inhaler, Rfl: 3    PARoxetine (PAXIL) 20 mg tablet, TAKE ONE TABLET EVERY DAY, Disp: 30 Tab, Rfl: 5    tolterodine (DETROL) 2 mg tablet, TAKE ONE TABLET TWO TIMES A DAY, Disp: 60 Tab, Rfl: 4    metoprolol succinate (TOPROL-XL) 100 mg tablet, Take 1 Tab by mouth daily. , Disp: 30 Tab, Rfl: 6    atorvastatin (LIPITOR) 80 mg tablet, TAKE ONE TABLET EVERY NIGHT AT BEDTIME, Disp: 30 Tab, Rfl: 4    mupirocin calcium (BACTROBAN) 2 % topical cream, Apply  to affected area two (2) times a day., Disp: 15 g, Rfl: 1    traMADol (ULTRAM) 50 mg tablet, Take 1 Tab by mouth every six (6) hours as needed for Pain. Max Daily Amount: 200 mg., Disp: 30 Tab, Rfl: 3    glucose blood VI test strips (ACCU-CHEK SMARTVIEW TEST STRIP) strip, Check glucose once daily Diabetes Mellitus Type 2 E11.9, Disp: 100 Strip, Rfl: 3    Blood-Glucose Meter (ACCU-CHEK KATHYA) misc, Check glucose once daily. Please dispense Accu-check Kathya Smartview Meter Diabetes Mellitus Type 2 E11.9, Disp: 1 Each, Rfl: 1    Lancets (ACCU-CHEK FASTCLIX) misc, Check glucose once daily Diabetes Mellitus Type 2 E11.9, Disp: 100 Each, Rfl: 3    alcohol swabs (BD SINGLE USE SWABS REGULAR) padm, Check glucose once daily. Diabetes Mellitus Type 2 E11.9, Disp: 100 Pad, Rfl: 3    losartan-hydrochlorothiazide (HYZAAR) 100-25 mg per tablet, Take 1 Tab by mouth daily. , Disp: 30 Tab, Rfl: 6    aspirin delayed-release 81 mg tablet, TAKE ONE TABLET EVERY DAY, Disp: 30 Tab, Rfl: 5    metFORMIN (GLUCOPHAGE) 500 mg tablet, TAKE ONE TABLET EVERY DAY WITH BREAKFAST, Disp: 30 Tab, Rfl: 5    amLODIPine (NORVASC) 10 mg tablet, TAKE ONE TABLET EVERY DAY, Disp: 30 Tab, Rfl: 6    mometasone (NASONEX) 50 mcg/actuation nasal spray, 2 sprays by Both Nostrils route daily. , Disp: 1 Container, Rfl: 3    magnesium oxide (MAG-OXIDE) 400 mg tablet, Take 1 Tab by mouth two (2) times a day., Disp: 15 Tab, Rfl: 0    acetaminophen (TYLENOL EXTRA STRENGTH) 500 mg tablet, Take 1 Tab by mouth every six (6) hours as needed for Pain., Disp: 30 Tab, Rfl: 4      PMH,  Meds, Allergies, Family History, Social history reviewed    Review of Systems   Cardiovascular: Positive for leg swelling (in left leg at times). Musculoskeletal: Positive for back pain (at times; thinks this may due to her bed. ). Physical Exam   Constitutional: She appears well-developed and well-nourished. No distress. Cardiovascular: Normal rate and regular rhythm. Exam reveals no gallop and no friction rub. No murmur heard. Pulmonary/Chest: Breath sounds normal. No respiratory distress. She has no wheezes. She has no rales. Musculoskeletal: She exhibits edema (left ankle; ). She exhibits no tenderness. Nursing note and vitals reviewed.      Visit Vitals    BP (!) 166/92    Pulse 72    Temp 98 °F (36.7 °C) (Oral)    Resp 16    Ht 4' 1\" (1.245 m)    SpO2 98%     General appearance: alert, cooperative, no distress, appears stated age  Neck: supple, symmetrical, trachea midline, no adenopathy, thyroid: not enlarged, symmetric, no tenderness/mass/nodules, no carotid bruit and no JVD  Lungs: clear to auscultation bilaterally  Heart: regular rate and rhythm, S1, S2 normal, no murmur, click, rub or gallop    Extremities: extremities normal, atraumatic, no cyanosis or edema      Lab Results   Component Value Date/Time    Cholesterol, total 236 02/20/2017 11:42 AM    HDL Cholesterol 83 02/20/2017 11:42 AM    LDL, calculated 136.2 02/20/2017 11:42 AM    VLDL, calculated 16.8 02/20/2017 11:42 AM    Triglyceride 84 02/20/2017 11:42 AM    CHOL/HDL Ratio 2.8 02/20/2017 11:42 AM     Lab Results   Component Value Date/Time    Sodium 141 02/20/2017 11:42 AM    Potassium 4.1 02/20/2017 11:42 AM    Chloride 104 02/20/2017 11:42 AM    CO2 29 02/20/2017 11:42 AM    Anion gap 8 02/20/2017 11:42 AM    Glucose 103 02/20/2017 11:42 AM    BUN 12 02/20/2017 11:42 AM    Creatinine 0.65 02/20/2017 11:42 AM    BUN/Creatinine ratio 18 02/20/2017 11:42 AM    GFR est AA >60 02/20/2017 11:42 AM    GFR est non-AA >60 02/20/2017 11:42 AM    Calcium 9.4 02/20/2017 11:42 AM         ASSESSMENT and PLAN    ICD-10-CM ICD-9-CM    1. Essential hypertension I10 401.9        BP better  Labs at next visit  Follow-up Disposition:  Return in about 3 months (around 9/28/2017) for htn/chol/dm. An After Visit Summary was printed and given to the patient. This has been fully explained to the patient, who indicates understanding.

## 2017-06-28 NOTE — PATIENT INSTRUCTIONS

## 2017-06-28 NOTE — MR AVS SNAPSHOT
Visit Information Date & Time Provider Department Dept. Phone Encounter #  
 6/28/2017 11:40 AM Fernanda Diaz, [address] [id number] Follow-up Instructions Return in about 3 months (around 9/28/2017) for htn/chol/dm. Upcoming Health Maintenance Date Due COLONOSCOPY 8/2/1973 EYE EXAM RETINAL OR DILATED Q1 7/14/2017 ZOSTER VACCINE AGE 60> 6/28/2017* INFLUENZA AGE 9 TO ADULT 8/1/2017 HEMOGLOBIN A1C Q6M 8/20/2017 FOOT EXAM Q1 2/20/2018 MICROALBUMIN Q1 2/20/2018 LIPID PANEL Q1 2/20/2018 PAP AKA CERVICAL CYTOLOGY 5/29/2018 BREAST CANCER SCRN MAMMOGRAM 3/1/2019 DTaP/Tdap/Td series (2 - Td) 5/19/2026 *Topic was postponed. The date shown is not the original due date. Allergies as of 6/28/2017  Review Complete On: 6/28/2017 By: Hailey Bravo LPN Severity Noted Reaction Type Reactions Other Food  10/29/2013    Other (comments) Seafood - intolerance Penicillins  07/02/2013    Diarrhea Current Immunizations  Reviewed on 2/20/2017 Name Date Influenza Vaccine 10/19/2015 Influenza Vaccine (Quad) PF 10/19/2016 Pneumococcal Conjugate (PCV-13) 5/19/2016 TB Skin Test (PPD) Intradermal 9/9/2013 10:40 AM, 9/3/2013  3:35 PM  
 Tdap 5/19/2016 Not reviewed this visit You Were Diagnosed With   
  
 Codes Comments Essential hypertension    -  Primary ICD-10-CM: I10 
ICD-9-CM: 401.9 Vitals BP Pulse Temp Resp Height(growth percentile) SpO2  
 (!) 166/92 72 98 °F (36.7 °C) (Oral) 16 4' 1\" (1.245 m) 98% OB Status Smoking Status Postmenopausal Current Some Day Smoker Vitals History Preferred Pharmacy Pharmacy Name Phone [facility] [address] [place], [address] [phone] Your Updated Medication List  
  
   
This list is accurate as of: 6/28/17 12:49 PM.  Always use your most recent med list.  
  
  
  
  
 acetaminophen 500 mg tablet Commonly known as:  [address] Take 1 Tab by mouth every six (6) hours as needed for Pain. albuterol 90 mcg/actuation inhaler Commonly known as:  PROVENTIL HFA, VENTOLIN HFA, PROAIR HFA Take 2 Puffs by inhalation every four (4) hours as needed for Wheezing. alcohol swabs Padm Commonly known as:  BD Single Use Swabs Regular Check glucose once daily. Diabetes Mellitus Type 2 E11.9  
  
 amLODIPine 10 mg tablet Commonly known as:  Celina Malou TAKE ONE TABLET EVERY DAY  
  
 aspirin delayed-release 81 mg tablet TAKE ONE TABLET EVERY DAY  
  
 atorvastatin 80 mg tablet Commonly known as:  LIPITOR  
TAKE ONE TABLET EVERY NIGHT AT BEDTIME Blood-Glucose Meter Misc Commonly known as:  Donta Dunham Check glucose once daily. Please dispense Accu-check Renetta Smartview Meter Diabetes Mellitus Type 2 E11.9  
  
 glucose blood VI test strips strip Commonly known as:  [address] Check glucose once daily Diabetes Mellitus Type 2 E11.9 Lancets Misc Commonly known as:  ACCU-CHEK FASTCLIX Check glucose once daily Diabetes Mellitus Type 2 E11.9  
  
 losartan-hydroCHLOROthiazide 100-25 mg per tablet Commonly known as:  HYZAAR Take 1 Tab by mouth daily. magnesium oxide 400 mg tablet Commonly known as:  MAG-OXIDE Take 1 Tab by mouth two (2) times a day. metFORMIN 500 mg tablet Commonly known as:  GLUCOPHAGE  
TAKE ONE TABLET EVERY DAY WITH BREAKFAST  
  
 metoprolol succinate 100 mg tablet Commonly known as:  TOPROL-XL Take 1 Tab by mouth daily. mometasone 50 mcg/actuation nasal spray Commonly known as:  NASONEX  
2 sprays by Both Nostrils route daily. mupirocin calcium 2 % topical cream  
Commonly known as:  [facility] [employer] Apply  to affected area two (2) times a day. PARoxetine 20 mg tablet Commonly known as:  PAXIL TAKE ONE TABLET EVERY DAY  
  
 tolterodine 2 mg tablet Commonly known as:  DETROL  
TAKE ONE TABLET TWO TIMES A DAY  
  
 traMADol 50 mg tablet Commonly known as:  ULTRAM  
Take 1 Tab by mouth every six (6) hours as needed for Pain. Max Daily Amount: 200 mg. Prescriptions Sent to Pharmacy Refills  
 albuterol (PROVENTIL HFA, VENTOLIN HFA, PROAIR HFA) 90 mcg/actuation inhaler 3 Sig: Take 2 Puffs by inhalation every four (4) hours as needed for Wheezing. Class: Normal  
 Pharmacy: [facility] - [place]  Ph #: [phone] Route: Inhalation Follow-up Instructions Return in about 3 months (around 9/28/2017) for htn/chol/dm. Patient Instructions Low Sodium Diet (2,000 Milligram): Care Instructions Your Care Instructions Too much sodium causes your body to hold on to extra water. This can raise your blood pressure and force your heart and kidneys to work harder. In very serious cases, this could cause you to be put in the hospital. It might even be life-threatening. By limiting sodium, you will feel better and lower your risk of serious problems. The most common source of sodium is salt. People get most of the salt in their diet from canned, prepared, and packaged foods. Fast food and restaurant meals also are very high in sodium. Your doctor will probably limit your sodium to less than 2,000 milligrams (mg) a day. This limit counts all the sodium in prepared and packaged foods and any salt you add to your food. Follow-up care is a key part of your treatment and safety. Be sure to make and go to all appointments, and call your doctor if you are having problems. It's also a good idea to know your test results and keep a list of the medicines you take. How can you care for yourself at home? Read food labels · Read labels on cans and food packages. The labels tell you how much sodium is in each serving. Make sure that you look at the serving size.  If you eat more than the serving size, you have eaten more sodium. · Food labels also tell you the Percent Daily Value for sodium. Choose products with low Percent Daily Values for sodium. · Be aware that sodium can come in forms other than salt, including monosodium glutamate (MSG), sodium citrate, and sodium bicarbonate (baking soda). MSG is often added to Asian food. When you eat out, you can sometimes ask for food without MSG or added salt. Buy low-sodium foods · Buy foods that are labeled \"unsalted\" (no salt added), \"sodium-free\" (less than 5 mg of sodium per serving), or \"low-sodium\" (less than 140 mg of sodium per serving). Foods labeled \"reduced-sodium\" and \"light sodium\" may still have too much sodium. Be sure to read the label to see how much sodium you are getting. · Buy fresh vegetables, or frozen vegetables without added sauces. Buy low-sodium versions of canned vegetables, soups, and other canned goods. Prepare low-sodium meals · Cut back on the amount of salt you use in cooking. This will help you adjust to the taste. Do not add salt after cooking. One teaspoon of salt has about 2,300 mg of sodium. · Take the salt shaker off the table. · Flavor your food with garlic, lemon juice, onion, vinegar, herbs, and spices. Do not use soy sauce, lite soy sauce, steak sauce, onion salt, garlic salt, celery salt, mustard, or ketchup on your food. · Use low-sodium salad dressings, sauces, and ketchup. Or make your own salad dressings and sauces without adding salt. · Use less salt (or none) when recipes call for it. You can often use half the salt a recipe calls for without losing flavor. Other foods such as rice, pasta, and grains do not need added salt. · Rinse canned vegetables, and cook them in fresh water. This removes somebut not allof the salt. · Avoid water that is naturally high in sodium or that has been treated with water softeners, which add sodium.  Call your local water company to find out the sodium content of your water supply. If you buy bottled water, read the label and choose a sodium-free brand. Avoid high-sodium foods · Avoid eating: ¨ Smoked, cured, salted, and canned meat, fish, and poultry. ¨ Ham, denis, hot dogs, and luncheon meats. ¨ Regular, hard, and processed cheese and regular peanut butter. ¨ Crackers with salted tops, and other salted snack foods such as pretzels, chips, and salted popcorn. ¨ Frozen prepared meals, unless labeled low-sodium. ¨ Canned and dried soups, broths, and bouillon, unless labeled sodium-free or low-sodium. ¨ Canned vegetables, unless labeled sodium-free or low-sodium. ¨ [place] fries, pizza, tacos, and other fast foods. ¨ Pickles, olives, ketchup, and other condiments, especially soy sauce, unless labeled sodium-free or low-sodium. Where can you learn more? Go to http://aspen-lesa.info/. Enter N533 in the search box to learn more about \"Low Sodium Diet (2,000 Milligram): Care Instructions. \" Current as of: July 26, 2016 Content Version: 11.3 © 2684-4858 [employer]. Care instructions adapted under license by [employer] (which disclaims liability or warranty for this information). If you have questions about a medical condition or this instruction, always ask your healthcare professional. [place] [address] any warranty or liability for your use of this information. Introducing [facility]! Reanna Mackverly introduces [employer] patient portal. Now you can access parts of your medical record, email your doctor's office, and request medication refills online. 1. In your internet browser, go to [email or web address] 2. Click on the First Time User? Click Here link in the Sign In box. You will see the New Member Sign Up page. 3. Enter your [employer] Access Code exactly as it appears below.  You will not need to use this code after youve completed the sign-up process. If you do not sign up before the expiration date, you must request a new code. · RestoM[employer] Access Code: [email or web address] Expires: 9/26/2017 12:49 PM 
 
4. Enter the last four digits of your Social Security Number (xxxx) and Date of Birth (mm/dd/yyyy) as indicated and click Submit. You will be taken to the next sign-up page. 5. Create a RestoMesto ID. This will be your [employer] login ID and cannot be changed, so think of one that is secure and easy to remember. 6. Create a [employer] password. You can change your password at any time. 7. Enter your Password Reset Question and Answer. This can be used at a later time if you forget your password. 8. Enter your e-mail address. You will receive e-mail notification when new information is available in [address]. 9. Click Sign Up. You can now view and download portions of your medical record. 10. Click the Download Summary menu link to download a portable copy of your medical information. If you have questions, please visit the Frequently Asked Questions section of the [employer] website. Remember, [employer] is NOT to be used for urgent needs. For medical emergencies, dial 911. Now available from your iPhone and Android! Please provide this summary of care documentation to your next provider. Your primary care clinician is listed as [address]. If you have any questions after today's visit, please call [phone].

## 2017-07-18 RX ORDER — LOSARTAN POTASSIUM AND HYDROCHLOROTHIAZIDE 25; 100 MG/1; MG/1
TABLET ORAL
Qty: 30 TAB | Refills: 5 | Status: SHIPPED | OUTPATIENT
Start: 2017-07-18 | End: 2017-09-28 | Stop reason: SDUPTHER

## 2017-08-08 DIAGNOSIS — N32.81 OAB (OVERACTIVE BLADDER): ICD-10-CM

## 2017-08-08 RX ORDER — METFORMIN HYDROCHLORIDE 500 MG/1
TABLET ORAL
Qty: 30 TAB | Refills: 3 | Status: SHIPPED | OUTPATIENT
Start: 2017-08-08 | End: 2017-09-28 | Stop reason: SDUPTHER

## 2017-08-08 RX ORDER — ATORVASTATIN CALCIUM 80 MG/1
TABLET, FILM COATED ORAL
Qty: 30 TAB | Refills: 3 | Status: SHIPPED | OUTPATIENT
Start: 2017-08-08 | End: 2017-09-28 | Stop reason: ALTCHOICE

## 2017-09-05 RX ORDER — LOSARTAN POTASSIUM AND HYDROCHLOROTHIAZIDE 25; 100 MG/1; MG/1
TABLET ORAL
Qty: 30 TAB | Refills: 5 | Status: SHIPPED | OUTPATIENT
Start: 2017-09-05 | End: 2017-09-28 | Stop reason: ALTCHOICE

## 2017-09-13 NOTE — TELEPHONE ENCOUNTER
Pt can not come  any meds from the office due to her being in a wheel chair and unable to use the stairs. Said that her tramadol has never had to be picked up before.

## 2017-09-14 RX ORDER — TRAMADOL HYDROCHLORIDE 50 MG/1
50 TABLET ORAL
Qty: 30 TAB | Refills: 3 | Status: SHIPPED | OUTPATIENT
Start: 2017-09-14 | End: 2018-04-19 | Stop reason: SDUPTHER

## 2017-09-19 ENCOUNTER — TELEPHONE (OUTPATIENT)
Dept: FAMILY MEDICINE CLINIC | Age: 62
End: 2017-09-19

## 2017-09-19 NOTE — TELEPHONE ENCOUNTER
pts aide called in to speak with a nurse. Said that pt bp was 189/119 and re checked at 2:45pm 184/121. Last time that it was checked was in office at her last apt. Complaining of headaches and nauseous last night 8/18/17. Pt hasn't been taking her medications regularly. Ms. Wendy Reyes made pt take bp meds today and headache subsided. Please advise. Pt daughter phone is [phone], Ms. Wendy Reyes will be leaving shortly.

## 2017-09-19 NOTE — TELEPHONE ENCOUNTER
Spoke with Fabian Roman, permission to release on file, to inform that as per Dr. Kaylee Slade patient needs to be compliant with medications and keep up coming appointment. Ms. Nolberto Hall denies patient having chest pain and shortness of breath. Informed Ms. Colunga of signs and symptoms of chest pain according to Clinical Reference and to go to emergency room if patient is experiencing any symptoms. Ms. Nolberto Hall stated that patient was having nausea but had taken pain medication without food and that the house was very warm with no air circulation. Ms Nolberto Hall will monitor patient, keep a blood sugar log, blood pressure log, and encourage patient to take medications. Offered to make a sooner appointment but Ms. Nolberto Hall denied changing appointment at this time. Informed to contact our office if blood pressures remained high or go to nearest emergency room. Ms. Nolberto Hall verbalized understanding.

## 2017-09-25 ENCOUNTER — APPOINTMENT (OUTPATIENT)
Dept: GENERAL RADIOLOGY | Age: 62
End: 2017-09-25
Attending: EMERGENCY MEDICINE
Payer: MEDICARE

## 2017-09-25 ENCOUNTER — HOSPITAL ENCOUNTER (EMERGENCY)
Age: 62
Discharge: HOME | End: 2017-09-25
Attending: EMERGENCY MEDICINE
Payer: MEDICARE

## 2017-09-25 ENCOUNTER — APPOINTMENT (OUTPATIENT)
Dept: CT IMAGING | Age: 62
End: 2017-09-25
Attending: EMERGENCY MEDICINE
Payer: MEDICARE

## 2017-09-25 VITALS
TEMPERATURE: 98 F | OXYGEN SATURATION: 100 % | WEIGHT: 187 LBS | DIASTOLIC BLOOD PRESSURE: 114 MMHG | HEART RATE: 81 BPM | BODY MASS INDEX: 37.7 KG/M2 | SYSTOLIC BLOOD PRESSURE: 184 MMHG | HEIGHT: 59 IN | RESPIRATION RATE: 19 BRPM

## 2017-09-25 DIAGNOSIS — R51.9 ACUTE NONINTRACTABLE HEADACHE, UNSPECIFIED HEADACHE TYPE: Primary | ICD-10-CM

## 2017-09-25 DIAGNOSIS — R05.9 COUGH: ICD-10-CM

## 2017-09-25 DIAGNOSIS — Z91.14 NONCOMPLIANCE WITH MEDICATIONS: ICD-10-CM

## 2017-09-25 DIAGNOSIS — R03.0 ELEVATED BLOOD PRESSURE READING: ICD-10-CM

## 2017-09-25 LAB
ANION GAP SERPL CALC-SCNC: 5 MMOL/L (ref 3–18)
BASOPHILS # BLD: 0.1 K/UL (ref 0–0.1)
BASOPHILS NFR BLD: 1 % (ref 0–2)
BUN SERPL-MCNC: 13 MG/DL (ref 7–18)
BUN/CREAT SERPL: 19 (ref 12–20)
CALCIUM SERPL-MCNC: 9.5 MG/DL (ref 8.5–10.1)
CHLORIDE SERPL-SCNC: 106 MMOL/L (ref 100–108)
CO2 SERPL-SCNC: 30 MMOL/L (ref 21–32)
CREAT SERPL-MCNC: 0.7 MG/DL (ref 0.6–1.3)
DIFFERENTIAL METHOD BLD: NORMAL
EOSINOPHIL # BLD: 0.2 K/UL (ref 0–0.4)
EOSINOPHIL NFR BLD: 2 % (ref 0–5)
ERYTHROCYTE [DISTWIDTH] IN BLOOD BY AUTOMATED COUNT: 14.4 % (ref 11.6–14.5)
GLUCOSE SERPL-MCNC: 89 MG/DL (ref 74–99)
HCT VFR BLD AUTO: 43.4 % (ref 35–45)
HGB BLD-MCNC: 14.5 G/DL (ref 12–16)
LYMPHOCYTES # BLD: 2.2 K/UL (ref 0.9–3.6)
LYMPHOCYTES NFR BLD: 21 % (ref 21–52)
MCH RBC QN AUTO: 28.9 PG (ref 24–34)
MCHC RBC AUTO-ENTMCNC: 33.4 G/DL (ref 31–37)
MCV RBC AUTO: 86.5 FL (ref 74–97)
MONOCYTES # BLD: 0.6 K/UL (ref 0.05–1.2)
MONOCYTES NFR BLD: 6 % (ref 3–10)
NEUTS SEG # BLD: 7.3 K/UL (ref 1.8–8)
NEUTS SEG NFR BLD: 70 % (ref 40–73)
PLATELET # BLD AUTO: 334 K/UL (ref 135–420)
PMV BLD AUTO: 9.5 FL (ref 9.2–11.8)
POTASSIUM SERPL-SCNC: 3.4 MMOL/L (ref 3.5–5.5)
RBC # BLD AUTO: 5.02 M/UL (ref 4.2–5.3)
SODIUM SERPL-SCNC: 141 MMOL/L (ref 136–145)
WBC # BLD AUTO: 10.2 K/UL (ref 4.6–13.2)

## 2017-09-25 PROCEDURE — 99284 EMERGENCY DEPT VISIT MOD MDM: CPT

## 2017-09-25 PROCEDURE — 74011250637 HC RX REV CODE- 250/637: Performed by: EMERGENCY MEDICINE

## 2017-09-25 PROCEDURE — 85025 COMPLETE CBC W/AUTO DIFF WBC: CPT | Performed by: EMERGENCY MEDICINE

## 2017-09-25 PROCEDURE — 70450 CT HEAD/BRAIN W/O DYE: CPT

## 2017-09-25 PROCEDURE — 71010 XR CHEST PORT: CPT

## 2017-09-25 PROCEDURE — 80048 BASIC METABOLIC PNL TOTAL CA: CPT | Performed by: EMERGENCY MEDICINE

## 2017-09-25 RX ORDER — AMLODIPINE BESYLATE 5 MG/1
5 TABLET ORAL DAILY
Qty: 30 TAB | Refills: 0 | Status: SHIPPED | OUTPATIENT
Start: 2017-09-25 | End: 2017-09-28 | Stop reason: SDUPTHER

## 2017-09-25 RX ORDER — POTASSIUM CHLORIDE 20 MEQ/1
40 TABLET, EXTENDED RELEASE ORAL
Status: COMPLETED | OUTPATIENT
Start: 2017-09-25 | End: 2017-09-25

## 2017-09-25 RX ORDER — ACETAMINOPHEN 500 MG
1000 TABLET ORAL
Status: COMPLETED | OUTPATIENT
Start: 2017-09-25 | End: 2017-09-25

## 2017-09-25 RX ADMIN — ACETAMINOPHEN 1000 MG: 500 TABLET ORAL at 19:02

## 2017-09-25 RX ADMIN — POTASSIUM CHLORIDE 40 MEQ: 20 TABLET, EXTENDED RELEASE ORAL at 19:55

## 2017-09-25 NOTE — ED PROVIDER NOTES
HPI Comments: Patient with a history of hypertension, stroke presents via walk-in triage with a headache started today slow onset bitemporal and throbbing in nature. She has been missing \"quite a few \"of her doses of blood pressure medications also reports that she stopped smoking a month ago. She reports a cough nonproductive no fever no chest pain or intra-abdominal pain no nausea no vomiting diarrhea no blurred vision and no recent trauma    Patient is a 58 y.o. female presenting with hypertension and headaches. Hypertension    Associated symptoms include headaches. Pertinent negatives include no chest pain and no shortness of breath. Headache   Associated symptoms include headaches. Pertinent negatives include no chest pain and no shortness of breath. Past Medical History:   Diagnosis Date    Arthritis     CVA [facility]cerebral vascular accident) ([facility] [address] 11/2010    Left side paralysis    Diabetes ([facility] [address]     Dyslipidemia 11/27/2012    Hypertension     Osteoarthritis 11/1/2012    Overactive bladder 12/13/2012    Psychiatric disorder     Anxiety    Type 2 diabetes mellitus ([facility] [address] 9/12/2012       Past Surgical History:   Procedure Laterality Date    HX DILATION AND CURETTAGE           Family History:   Problem Relation Age of Onset    Diabetes Mother     Diabetes Sister        Social History     Social History    Marital status: SINGLE     Spouse name: N/A    Number of children: N/A    Years of education: N/A     Occupational History   [address]     disabled     Social History Main Topics    Smoking status: Current Some Day Smoker     Types: Cigarettes     Last attempt to quit: 5/15/2016    Smokeless tobacco: Never Used    Alcohol use No    Drug use: No    Sexual activity: No     Other Topics Concern    Not on file     Social History Narrative         ALLERGIES: Other food and Penicillins    Review of Systems   Constitutional: Negative for fever. HENT: Negative for nosebleeds. Eyes: Negative for visual disturbance. Respiratory: Negative for shortness of breath. Cardiovascular: Negative for chest pain. Gastrointestinal: Negative for blood in stool. Genitourinary: Negative for dysuria. Musculoskeletal: Negative for myalgias. Skin: Negative for rash. Neurological: Positive for headaches. All other systems reviewed and are negative. Vitals:    09/25/17 1524 09/25/17 1915   BP: (!) 189/121 (!) 177/108   Pulse: 80 79   Resp: 16 16   Temp: 98 °F (36.7 °C)    SpO2:  99%   Weight: 84.8 kg (187 lb)    Height: 4' 11\" (1.499 m)             Physical Exam   Constitutional:   General:  Well-developed, well-nourished, nontoxic nondiaphoretic. Head:  Normocephalic atraumatic. Eyes:  Pupils 4 mm round and reactive  extraocular movements intact. No pallor or conjunctival injection. Nose:  No rhinorrhea, inspection grossly normal.    Ears:  Grossly normal to inspection, no discharge. Mouth:  Mucous membranes moist, no appreciable intraoral lesion. Neck/Back:  Trachea midline, no asymmetry. Chest:  Grossly normal inspection, symmetric chest rise. Pulmonary:  Clear to auscultation bilaterally no wheezes rhonchi or rales. Cardiovascular:  S1-S2 no murmurs rubs or gallops. Abdomen: Soft, nontender, nondistended no guarding rebound or peritoneal signs. Extremities:  Grossly normal to inspection, peripheral pulses intact  in all 4 extremities  Neurologic:  Alert and oriented no appreciable focal neurologic deficit. Skin:  Warm and dry  Psychiatric:  Grossly normal mood and affect. Nursing note reviewed, vital signs reviewed. MDM  Number of Diagnoses or Management Options  Acute nonintractable headache, unspecified headache type:   Cough:   Elevated blood pressure reading:   Noncompliance with medications:   Diagnosis management comments: ED course:  Patient presents with acute headache, elevated blood pressure medication noncompliance.   She is neurologically intact, I doubted this patient approximately 3-1/2 hours after her initial triage, since that time she has taken no blood pressure medications for her blood pressure has spontaneously come down. Is now 160s over 90s on my evaluation, she reports that her headache is spontaneously resolved except for a mild bitemporal headache. She reports a constant cough given her a smoking the consider COPD but she has no wheezing on forced exhalation, proceed with a chest x-ray and monitor here. Labs reviewed otherwise unremarkable except for a mild hypokalemia of 3.4    CT head per radiology old MCA infarct with no acute changes    Labs unremarkable except for low potassium 3.4 replaced orally    Patient's daughter is now at bedside, she reports that she has \"bags \"of medication sitting in her house, she now has a home health aide and plans to go to the primary care doctor to discuss her polypharmacy. Discuss possibly writing a prescription for Norvasc here, seems that she is on Norvasc but she does not remember she is actively taking it, discussed giving her prescription but this would add to her pill confusion, since she does not have any emergent need for blood pressure control, she is stable for outpatient management by her primary care physician next week as previously scheduled    Patient's presentation, history, physical exam and laboratory evaluations were reviewed. At this time patient was felt to be stable for outpatient management and follow with primary care/specialist.  Patient was instructed to return to the emergency department with any concerns. Disposition:    Discharged home      Portions of this chart were created with Dragon medical speech to text program.   Unrecognized errors may be present.         ED Course       Procedures

## 2017-09-25 NOTE — ED NOTES
I performed a brief evaluation, including history and physical, of the patient here in triage and I have determined that pt will need further treatment and evaluation from the main side ER physician. I have placed initial orders to help in expediting patients care.      September 25, 2017 at 2:56PM - Melania Burns DO        Visit Vitals    BP (!) 189/121 (BP 1 Location: Left arm, BP Patient Position: At rest)    Pulse 80    Temp 98 °F (36.7 °C)    Resp 16    Ht 4' 11\" (1.499 m)    Wt 84.8 kg (187 lb)    BMI 37.77 kg/m2

## 2017-09-25 NOTE — DISCHARGE INSTRUCTIONS
Headache: Care Instructions  Your Care Instructions    Headaches have many possible causes. Most headaches aren't a sign of a more serious problem, and they will get better on their own. Home treatment may help you feel better faster. The doctor has checked you carefully, but problems can develop later. If you notice any problems or new symptoms, get medical treatment right away. Follow-up care is a key part of your treatment and safety. Be sure to make and go to all appointments, and call your doctor if you are having problems. It's also a good idea to know your test results and keep a list of the medicines you take. How can you care for yourself at home? · Do not drive if you have taken a prescription pain medicine. · Rest in a quiet, dark room until your headache is gone. Close your eyes and try to relax or go to sleep. Don't watch TV or read. · Put a cold, moist cloth or cold pack on the painful area for 10 to 20 minutes at a time. Put a thin cloth between the cold pack and your skin. · Use a warm, moist towel or a heating pad set on low to relax tight shoulder and neck muscles. · Have someone gently massage your neck and shoulders. · Take pain medicines exactly as directed. ¨ If the doctor gave you a prescription medicine for pain, take it as prescribed. ¨ If you are not taking a prescription pain medicine, ask your doctor if you can take an over-the-counter medicine. · Be careful not to take pain medicine more often than the instructions allow, because you may get worse or more frequent headaches when the medicine wears off. · Do not ignore new symptoms that occur with a headache, such as a fever, weakness or numbness, vision changes, or confusion. These may be signs of a more serious problem. To prevent headaches  · Keep a headache diary so you can figure out what triggers your headaches. Avoiding triggers may help you prevent headaches.  Record when each headache began, how long it lasted, and what the pain was like (throbbing, aching, stabbing, or dull). Write down any other symptoms you had with the headache, such as nausea, flashing lights or dark spots, or sensitivity to bright light or loud noise. Note if the headache occurred near your period. List anything that might have triggered the headache, such as certain foods (chocolate, cheese, wine) or odors, smoke, bright light, stress, or lack of sleep. · Find healthy ways to deal with stress. Headaches are most common during or right after stressful times. Take time to relax before and after you do something that has caused a headache in the past.  · Try to keep your muscles relaxed by keeping good posture. Check your jaw, face, neck, and shoulder muscles for tension, and try relaxing them. When sitting at a desk, change positions often, and stretch for 30 seconds each hour. · Get plenty of sleep and exercise. · Eat regularly and well. Long periods without food can trigger a headache. · Treat yourself to a massage. Some people find that regular massages are very helpful in relieving tension. · Limit caffeine by not drinking too much coffee, tea, or soda. But don't quit caffeine suddenly, because that can also give you headaches. · Reduce eyestrain from computers by blinking frequently and looking away from the computer screen every so often. Make sure you have proper eyewear and that your monitor is set up properly, about an arm's length away. · Seek help if you have depression or anxiety. Your headaches may be linked to these conditions. Treatment can both prevent headaches and help with symptoms of anxiety or depression. When should you call for help? Call 911 anytime you think you may need emergency care. For example, call if:  · You have signs of a stroke. These may include:  ¨ Sudden numbness, paralysis, or weakness in your face, arm, or leg, especially on only one side of your body. ¨ Sudden vision changes.   ¨ Sudden trouble speaking. ¨ Sudden confusion or trouble understanding simple statements. ¨ Sudden problems with walking or balance. ¨ A sudden, severe headache that is different from past headaches. Call your doctor now or seek immediate medical care if:  · You have a new or worse headache. · Your headache gets much worse. Where can you learn more? Go to http://aspen-lesa.info/. Enter M271 in the search box to learn more about \"Headache: Care Instructions. \"  Current as of: October 14, 2016  Content Version: 11.3  © 9863-4302 [employer]. Care instructions adapted under license by [employer] (which disclaims liability or warranty for this information). If you have questions about a medical condition or this instruction, always ask your healthcare professional. [address] any warranty or liability for your use of this information. Elevated Blood Pressure: Care Instructions  Your Care Instructions    Blood pressure is a measure of how hard the blood pushes against the walls of your arteries. It's normal for blood pressure to go up and down throughout the day. But if it stays up over time, you have high blood pressure. Two numbers tell you your blood pressure. The first number is the systolic pressure. It shows how hard the blood pushes when your heart is pumping. The second number is the diastolic pressure. It shows how hard the blood pushes between heartbeats, when your heart is relaxed and filling with blood. An ideal blood pressure in adults is less than 120/80 (say \"120 over 80\"). High blood pressure is 140/90 or higher. You have high blood pressure if your top number is 140 or higher or your bottom number is 90 or higher, or both. The main test for high blood pressure is simple, fast, and painless. To diagnose high blood pressure, your doctor will test your blood pressure at different times.  After testing your blood pressure, your doctor may ask you to test it again when you are home. If you are diagnosed with high blood pressure, you can work with your doctor to make a long-term plan to manage it. Follow-up care is a key part of your treatment and safety. Be sure to make and go to all appointments, and call your doctor if you are having problems. It's also a good idea to know your test results and keep a list of the medicines you take. How can you care for yourself at home? · Do not smoke. Smoking increases your risk for heart attack and stroke. If you need help quitting, talk to your doctor about stop-smoking programs and medicines. These can increase your chances of quitting for good. · Stay at a healthy weight. · Try to limit how much sodium you eat to less than 2,300 milligrams (mg) a day. Your doctor may ask you to try to eat less than 1,500 mg a day. · Be physically active. Get at least 30 minutes of exercise on most days of the week. Walking is a good choice. You also may want to do other activities, such as running, swimming, cycling, or playing tennis or team sports. · Avoid or limit alcohol. Talk to your doctor about whether you can drink any alcohol. · Eat plenty of fruits, vegetables, and low-fat dairy products. Eat less saturated and total fats. · Learn how to check your blood pressure at home. When should you call for help? Call your doctor now or seek immediate medical care if:  · Your blood pressure is much higher than normal (such as 180/110 or higher). · You think high blood pressure is causing symptoms such as:  ¨ Severe headache. ¨ Blurry vision. Watch closely for changes in your health, and be sure to contact your doctor if:  · You do not get better as expected. Where can you learn more? Go to http://aspen-lesa.info/. Enter M568 in the search box to learn more about \"Elevated Blood Pressure: Care Instructions. \"  Current as of: April 3, 2017  Content Version: 11.3  © 7161-8015 [employer]. Care instructions adapted under license by [employer] (which disclaims liability or warranty for this information). If you have questions about a medical condition or this instruction, always ask your healthcare professional. [address] any warranty or liability for your use of this information.

## 2017-09-25 NOTE — ED TRIAGE NOTES
Patient presents to the ED with High blood pressure. Patient complains of a headache and states she felt sick.

## 2017-09-26 NOTE — ED NOTES
I have reviewed discharge instructions with the patient. The patient verbalized understanding. Patient armband removed and shredded. Patient  D/c via wheelchair, family present. She denies pain. Has no additional questions upon leaving.

## 2017-09-28 ENCOUNTER — OFFICE VISIT (OUTPATIENT)
Dept: FAMILY MEDICINE CLINIC | Age: 62
End: 2017-09-28

## 2017-09-28 VITALS
RESPIRATION RATE: 18 BRPM | HEIGHT: 59 IN | OXYGEN SATURATION: 98 % | SYSTOLIC BLOOD PRESSURE: 160 MMHG | HEART RATE: 76 BPM | DIASTOLIC BLOOD PRESSURE: 90 MMHG | TEMPERATURE: 98.1 F

## 2017-09-28 DIAGNOSIS — I10 HTN (HYPERTENSION), BENIGN: Primary | ICD-10-CM

## 2017-09-28 DIAGNOSIS — E78.5 DYSLIPIDEMIA: Chronic | ICD-10-CM

## 2017-09-28 DIAGNOSIS — Z23 ENCOUNTER FOR IMMUNIZATION: ICD-10-CM

## 2017-09-28 DIAGNOSIS — N32.81 OAB (OVERACTIVE BLADDER): ICD-10-CM

## 2017-09-28 DIAGNOSIS — I69.359 CVA, OLD, HEMIPARESIS (HCC): ICD-10-CM

## 2017-09-28 RX ORDER — METOPROLOL SUCCINATE 100 MG/1
100 TABLET, EXTENDED RELEASE ORAL DAILY
Qty: 30 TAB | Refills: 6 | Status: SHIPPED | OUTPATIENT
Start: 2017-09-28 | End: 2018-04-19 | Stop reason: SDUPTHER

## 2017-09-28 RX ORDER — AMLODIPINE BESYLATE 5 MG/1
5 TABLET ORAL DAILY
Qty: 30 TAB | Refills: 6 | Status: SHIPPED | OUTPATIENT
Start: 2017-09-28 | End: 2018-04-19 | Stop reason: SDUPTHER

## 2017-09-28 RX ORDER — TOLTERODINE TARTRATE 2 MG/1
TABLET, EXTENDED RELEASE ORAL
Qty: 60 TAB | Refills: 6 | Status: SHIPPED | OUTPATIENT
Start: 2017-09-28

## 2017-09-28 RX ORDER — LOSARTAN POTASSIUM AND HYDROCHLOROTHIAZIDE 25; 100 MG/1; MG/1
TABLET ORAL
Qty: 30 TAB | Refills: 6 | Status: SHIPPED | OUTPATIENT
Start: 2017-09-28

## 2017-09-28 RX ORDER — ROSUVASTATIN CALCIUM 10 MG/1
10 TABLET, COATED ORAL
Qty: 90 TAB | Refills: 3 | Status: SHIPPED | OUTPATIENT
Start: 2017-09-28 | End: 2017-12-22 | Stop reason: SDUPTHER

## 2017-09-28 NOTE — PROGRESS NOTES
HISTORY OF PRESENT ILLNESS  Diana Rogel is a 58 y.o. female. HPI   Patient is here today for evaluation and treatment of; Diabetes, Hypertension, Cholesterol. Diabetes: Pt is on meds as listed below;  Pt tolerates meds well and     Hypertension:  BP is elevated today; She continues on meds as listed. She may not always take her BP med. Cholesterol:pt prescribed lipitor. She has had a cva. Risks of uncontrolled cholesterol reviewed with pt. Pt has OAB: she has not been taking detrol;  Needs a bedside commode  Desires a flu shot    PMH,  Meds, Allergies, Family History, Social history reviewed      Current Outpatient Prescriptions:     rosuvastatin (CRESTOR) 10 mg tablet, Take 1 Tab by mouth nightly., Disp: 90 Tab, Rfl: 3    losartan-hydroCHLOROthiazide (HYZAAR) 100-25 mg per tablet, TAKE ONE TABLET EVERY DAY, Disp: 30 Tab, Rfl: 6    tolterodine (DETROL) 2 mg tablet, TAKE ONE TABLET TWO TIMES A DAY, Disp: 60 Tab, Rfl: 6    metoprolol succinate (TOPROL-XL) 100 mg tablet, Take 1 Tab by mouth daily. , Disp: 30 Tab, Rfl: 6    amLODIPine (NORVASC) 5 mg tablet, Take 1 Tab by mouth daily. , Disp: 30 Tab, Rfl: 6    traMADol (ULTRAM) 50 mg tablet, Take 1 Tab by mouth every six (6) hours as needed for Pain. Max Daily Amount: 200 mg., Disp: 30 Tab, Rfl: 3    albuterol (PROVENTIL HFA, VENTOLIN HFA, PROAIR HFA) 90 mcg/actuation inhaler, Take 2 Puffs by inhalation every four (4) hours as needed for Wheezing., Disp: 1 Inhaler, Rfl: 3    mupirocin calcium (BACTROBAN) 2 % topical cream, Apply  to affected area two (2) times a day., Disp: 15 g, Rfl: 1    glucose blood VI test strips (ACCU-CHEK SMARTVIEW TEST STRIP) strip, Check glucose once daily Diabetes Mellitus Type 2 E11.9, Disp: 100 Strip, Rfl: 3    Blood-Glucose Meter (ACCU-CHEK KATHYA) misc, Check glucose once daily.  Please dispense Accu-check Kathya Smartview Meter Diabetes Mellitus Type 2 E11.9, Disp: 1 Each, Rfl: 1    Lancets (ACCU-CHEK FASTCLIX) misc, Check glucose once daily Diabetes Mellitus Type 2 E11.9, Disp: 100 Each, Rfl: 3    alcohol swabs (BD SINGLE USE SWABS REGULAR) padm, Check glucose once daily. Diabetes Mellitus Type 2 E11.9, Disp: 100 Pad, Rfl: 3    aspirin delayed-release 81 mg tablet, TAKE ONE TABLET EVERY DAY, Disp: 30 Tab, Rfl: 5    metFORMIN (GLUCOPHAGE) 500 mg tablet, TAKE ONE TABLET EVERY DAY WITH BREAKFAST, Disp: 30 Tab, Rfl: 5    mometasone (NASONEX) 50 mcg/actuation nasal spray, 2 sprays by Both Nostrils route daily. , Disp: 1 Container, Rfl: 3    acetaminophen (TYLENOL EXTRA STRENGTH) 500 mg tablet, Take 1 Tab by mouth every six (6) hours as needed for Pain., Disp: 30 Tab, Rfl: 4      Review of Systems   Constitutional: Negative for chills and fever. Respiratory: Negative for cough, shortness of breath and wheezing. Cardiovascular: Negative for chest pain and palpitations. Physical Exam   Visit Vitals    /90    Pulse 76    Temp 98.1 °F (36.7 °C) (Oral)    Resp 18    Ht 4' 11\" (1.499 m)    SpO2 98%         ASSESSMENT and PLAN    ICD-10-CM ICD-9-CM    1. HTN (hypertension), benign I10 401.1    2. OAB (overactive bladder) N32.81 596.51 AMB SUPPLY ORDER   3. Dyslipidemia E78.5 272.4    4. Encounter for immunization Z23 V03.89 INFLUENZA VIRUS VAC QUAD,SPLIT,PRESV FREE SYRINGE IM   5.  CVA, old, hemiparesis ([facility] I69.359 438.20 AMB SUPPLY ORDER      AMB SUPPLY ORDER       As above,    treatment plan as listed below  Orders Placed This Encounter    AMB SUPPLY ORDER    AMB SUPPLY ORDER    Influenza virus vaccine (QUADRIVALENT PRES FREE SYRINGE) IM (83029)    rosuvastatin (CRESTOR) 10 mg tablet    losartan-hydroCHLOROthiazide (HYZAAR) 100-25 mg per tablet    tolterodine (DETROL) 2 mg tablet    metoprolol succinate (TOPROL-XL) 100 mg tablet    amLODIPine (NORVASC) 5 mg tablet         As above, change to crestor  Take detrol daily  Orders Placed This Encounter    AMB SUPPLY ORDER    AMB SUPPLY ORDER    Influenza virus vaccine (QUADRIVALENT PRES FREE SYRINGE) IM ([id number])    rosuvastatin (CRESTOR) 10 mg tablet    losartan-hydroCHLOROthiazide (HYZAAR) 100-25 mg per tablet    tolterodine (DETROL) 2 mg tablet    metoprolol succinate (TOPROL-XL) 100 mg tablet    amLODIPine (NORVASC) 5 mg tablet     Flu shot today  DME orders placed. Follow-up Disposition:  Return in about 3 months (around 12/28/2017) for BP/lipid. An After Visit Summary was printed and given to the patient. This has been fully explained to the patient, who indicates understanding.

## 2017-09-28 NOTE — MR AVS SNAPSHOT
Visit Information Date & Time Provider Department Dept. Phone Encounter #  
 9/28/2017 11:20 AM Gissell Diaz, [address] [id number] Follow-up Instructions Return in about 3 months (around 12/28/2017) for BP/lipid. Your Appointments 2/12/2018 11:00 AM  
ANNUAL with Gissell Diaz MD  
[facility] Primary Care [address]) Appt Note: ;   
 [address], [address] [address]  
[phone]  
  
   
 [address] [address] [address] Upcoming Health Maintenance Date Due COLONOSCOPY 8/2/1973 ZOSTER VACCINE AGE 60> 6/2/2015 EYE EXAM RETINAL OR DILATED Q1 7/14/2017 INFLUENZA AGE 9 TO ADULT 8/1/2017 HEMOGLOBIN A1C Q6M 8/20/2017 FOOT EXAM Q1 2/20/2018 MICROALBUMIN Q1 2/20/2018 LIPID PANEL Q1 2/20/2018 PAP AKA CERVICAL CYTOLOGY 5/29/2018 BREAST CANCER SCRN MAMMOGRAM 3/1/2019 DTaP/Tdap/Td series (2 - Td) 5/19/2026 Allergies as of 9/28/2017  Review Complete On: 9/28/2017 By: Remi Mo LPN Severity Noted Reaction Type Reactions Other Food  10/29/2013    Other (comments) Seafood - intolerance Penicillins  07/02/2013    Diarrhea Current Immunizations  Reviewed on 2/20/2017 Name Date Influenza Vaccine 10/19/2015 Influenza Vaccine (Quad) PF  Incomplete, 10/19/2016 Pneumococcal Conjugate (PCV-13) 5/19/2016 TB Skin Test (PPD) Intradermal 9/9/2013 10:40 AM, 9/3/2013  3:35 PM  
 Tdap 5/19/2016 Not reviewed this visit You Were Diagnosed With   
  
 Codes Comments HTN (hypertension), benign    -  Primary ICD-10-CM: I10 
ICD-9-CM: 401.1   
 OAB (overactive bladder)     ICD-10-CM: N32.81 ICD-9-CM: 596.51 Dyslipidemia     ICD-10-CM: E78.5 ICD-9-CM: 272.4 Encounter for immunization     ICD-10-CM: O10 ICD-9-CM: V18.80   
 CVA, old, hemiparesis ([facility] [address]     ICD-10-CM: H81.461 ICD-9-CM: 438.20 Vitals BP Pulse Temp Resp Height(growth percentile) SpO2  
 160/90 76 98.1 °F (36.7 °C) (Oral) 18 4' 11\" (1.499 m) 98% OB Status Smoking Status Postmenopausal Former Smoker Vitals History Preferred Pharmacy Pharmacy Name Phone [facility] [place], [address] [phone] Your Updated Medication List  
  
   
This list is accurate as of: 9/28/17  1:00 PM.  Always use your most recent med list.  
  
  
  
  
 acetaminophen 500 mg tablet Commonly known as:  80 Jr Sabrina Cummings Se Take 1 Tab by mouth every six (6) hours as needed for Pain. albuterol 90 mcg/actuation inhaler Commonly known as:  PROVENTIL HFA, VENTOLIN HFA, PROAIR HFA Take 2 Puffs by inhalation every four (4) hours as needed for Wheezing. alcohol swabs Padm Commonly known as:  BD Single Use Swabs Regular Check glucose once daily. Diabetes Mellitus Type 2 E11.9  
  
 amLODIPine 5 mg tablet Commonly known as:  Poplar Daniela Take 1 Tab by mouth daily. aspirin delayed-release 81 mg tablet TAKE ONE TABLET EVERY DAY Blood-Glucose Meter Misc Commonly known as:  Neva Win Check glucose once daily. Please dispense Accu-check Renetta Smartview Meter Diabetes Mellitus Type 2 E11.9  
  
 glucose blood VI test strips strip Commonly known as:  [address] Check glucose once daily Diabetes Mellitus Type 2 E11.9 Lancets Misc Commonly known as:  ACCU-CHEK FASTCLIX Check glucose once daily Diabetes Mellitus Type 2 E11.9  
  
 losartan-hydroCHLOROthiazide 100-25 mg per tablet Commonly known as:  HYZAAR  
TAKE ONE TABLET EVERY DAY  
  
 metFORMIN 500 mg tablet Commonly known as:  GLUCOPHAGE  
TAKE ONE TABLET EVERY DAY WITH BREAKFAST  
  
 metoprolol succinate 100 mg tablet Commonly known as:  TOPROL-XL Take 1 Tab by mouth daily. mometasone 50 mcg/actuation nasal spray Commonly known as:  Demetrius Aly 2 sprays by Both Nostrils route daily. mupirocin calcium 2 % topical cream  
Commonly known as:  [employer] Apply  to affected area two (2) times a day. rosuvastatin 10 mg tablet Commonly known as:  CRESTOR Take 1 Tab by mouth nightly. tolterodine 2 mg tablet Commonly known as:  DETROL  
TAKE ONE TABLET TWO TIMES A DAY  
  
 traMADol 50 mg tablet Commonly known as:  ULTRAM  
Take 1 Tab by mouth every six (6) hours as needed for Pain. Max Daily Amount: 200 mg. Prescriptions Sent to Pharmacy Refills  
 rosuvastatin (CRESTOR) 10 mg tablet 3 Sig: Take 1 Tab by mouth nightly. Class: Normal  
 Pharmacy: [facility] [address] [place], [place] [address]an [facility], [facility] Ph #: [phone] Route: Oral  
 losartan-hydroCHLOROthiazide (HYZAAR) 100-25 mg per tablet 6 Sig: TAKE ONE TABLET EVERY DAY Class: Normal  
 Pharmacy: [facility] [place] [address] Ph #: [phone]  
 tolterodine (DETROL) 2 mg tablet 6 Sig: TAKE ONE TABLET TWO TIMES A DAY Class: Normal  
 Pharmacy: [facility] [place] [address] Ph #: [phone]  
 metoprolol succinate (TOPROL-XL) 100 mg tablet 6 Sig: Take 1 Tab by mouth daily. Class: Normal  
 Pharmacy: [facility] [address], [facility] Ph #: [phone] Route: Oral  
 amLODIPine (NORVASC) 5 mg tablet 6 Sig: Take 1 Tab by mouth daily. Class: Normal  
 Pharmacy[address] [facility] [address] [facility], [facility] Ph #: [phone] Route: Oral  
  
We Performed the Following AMB SUPPLY ORDER [[id number] Custom] Comments:  
 Hospital bed AMB SUPPLY ORDER [[id number] Custom] Comments:  
 Bedside commode INFLUENZA VIRUS VAC QUAD,SPLIT,PRESV FREE SYRINGE IM [id number] CPT(R)] Follow-up Instructions Return in about 3 months (around 12/28/2017) for BP/lipid. Patient Instructions Influenza (Flu) Vaccine (Live, Intranasal): What You Need to Know Why get vaccinated? Influenza (\"flu\") is a contagious disease that spreads around the [place] every winter, usually between October and May. Flu is caused by influenza viruses, and is spread mainly by coughing, sneezing, and close contact. Anyone can get the flu. Flu strikes suddenly and can last several days. Symptoms vary by age, but can include: · Fever/chills. · Sore throat. · Muscle aches. · Fatigue. · Cough. · Headache. · Runny or stuffy nose. Flu can also lead to pneumonia and blood infections, and cause diarrhea and seizures in children. If you have a medical condition, such as heart or lung disease, flu can make it worse. Flu can also lead to pneumonia and blood infections, and cause diarrhea and seizures in children. If you have a medical condition, such as heart or lung disease, flu can make it worse. Each year thousands of people in the [facility] die from flu, and many more are hospitalized. Flu vaccine can: · Keep you from getting flu. · Make flu less severe if you do get it, and 
· Keep you from spreading flu to your family and other people. Live, attenuated flu vaccineLAIV, nasal spray A dose of flu vaccine is recommended every flu season. Children younger than 5years of age may need two doses during the same flu season. Everyone else needs only one dose each flu season. The live, attenuated influenza vaccine (called LAIV) may be given to healthy, non-pregnant people 2 through 52years of age. It may safely be given at the same time as other vaccines. LAIV is sprayed into the nose. LAIV does not contain thimerosal or other preservatives. It is made from weakened flu virus and does not cause flu. There are many flu viruses, and they are always changing.  Each year LAIV is made to protect against four viruses that are likely to cause disease in the upcoming flu season. But even when the vaccine doesn't exactly match these viruses, it may still provide some protection. Flu vaccine cannot prevent: · Flu that is caused by a virus not covered by the vaccine, or 
· Illnesses that look like flu but are not. It takes about 2 weeks for protection to develop after vaccination, and protection lasts through the flu season. Some people should not get this vaccine Some people should not get LAIV because of age, health conditions, or other reasons. Most of these people should get an injected flu vaccine instead. Your healthcare provider can help you decide. Tell the provider if you or the person being vaccinated: 
· Have any allergies, including an allergy to eggs, or have ever had an allergic reaction to an influenza vaccine. · Have ever had Guillain-Barré Syndrome (also called GBS). · Have any long-term heart, breathing, kidney, liver, or nervous system problems. · Have asthma or breathing problems, or are a child who has had wheezing episodes. · Are pregnant. · Are a child or adolescent who is receiving aspirin or aspirin-containing products. · Have a weakened immune system. · Will be visiting or taking care of someone, within the next 7 days, who requires a protected environment (for example, following a bone marrow transplant) Sometimes LAIV should be delayed. Tell the provider if you or the person being vaccinated: · Are not feeling well. The vaccine could be delayed until you feel better. · Have gotten any other vaccines in the past 4 weeks. Live vaccines given too close together might not work as well. · Have taken influenza antiviral medication in the past 48 hours. · Have a very stuffy nose. Risks of a vaccine reaction With any medicine, including vaccines, there is a chance of reactions. These are usually mild and go away on their own, but serious reactions are also possible. Most people who get LAIV do not have any problems with it. Reactions to LAIV may resemble a very mild case of flu. Problems that have been reported following LAIV: 
Children and adolescents 317 years of age: · Runny nose/nasal congestion · Cough · Fever · Headache and muscle aches · Wheezing abdominal pain, vomiting, or diarrhea Adults 2549 years of age: · Runny nose/nasal congestion · Sore throat · Cough · Chills · Tiredness/weakness · Headache Problems that could happen after any vaccine: · Any medication can cause a severe allergic reaction. Such reactions from a vaccine are very rare, estimated at about 1 in a million doses, and would happen within a few minutes to a few hours after the vaccination. As with any medicine, there is a very remote chance of a vaccine causing a serious injury or death. The safety of vaccines is always being monitored. For more information, visit: www.cdc.gov/vaccinesafety/ What if there is a serious reaction? What should I look for? · Look for anything that concerns you, such as signs of a severe allergic reaction, very high fever, or unusual behavior. Signs of a severe allergic reaction can include hives, swelling of the face and throat, difficulty breathing, a fast heartbeat, dizziness, and weakness. These would start a few minutes to a few hours after the vaccination. What should I do? · If you think it is a severe allergic reaction or other emergency that can't wait, call 9-1-1 or get the person to the nearest hospital. Otherwise, call your doctor. · Reactions should be reported to the \"Vaccine Adverse Event Reporting System\" (VAERS). Your doctor should file this report, or you can do it yourself through the VAERS web site at www.vaers. hhs.gov, or by calling 9-179.682.7788. VAERS does not give medical advice.  
The Consolidated [employer] Vaccine Injury Compensation Program 
The [employer]olidated [employer] Vaccine Injury Compensation Program (VICP) is a federal program that was created to compensate people who may have been injured by certain vaccines. Persons who believe they may have been injured by a vaccine can learn about the program and about filing a claim by calling 5-133.335.6987 or visiting the [employer] website at www.[facility].gov/vaccinecompensation. There is a time limit to file a claim for compensation. How can I learn more? · Ask your doctor. He or she can give you the vaccine package insert or suggest other sources of information. · Call your local or state health department. · Contact the Centers for Disease Control and Prevention (CDC): 
¨ Call 9-585.535.1037 (1-800-CDC-INFO) or ¨ Visit CDC's website at www.cdc.gov/flu Vaccine Information Statement Live Attenuated Influenza Vaccine 8/7/2015 
42 UG[address] Menon 298SD-55 Department of Health and [employer] Centers for Disease Control and Prevention Many Vaccine Information Statements are available in Romansh and other languages. See www.immunize.org/vis. Muchas hojas de información sobre vacunas están disponibles en español y en otros idiomas. Visite www.immunize.org/vis. Content Version: 96.1.240829 Introducing [facility]! Izzy Solorio introduces [employer] patient portal. Now you can access parts of your medical record, email your doctor's office, and request medication refills online. 1. In your internet browser, go to [email or web address] 2. Click on the First Time User? Click Here link in the Sign In box. You will see the New Member Sign Up page. 3. Enter your [employer] Access Code exactly as it appears below. You will not need to use this code after youve completed the sign-up process. If you do not sign up before the expiration date, you must request a new code. · [employer] Access Code: [email or web address] Expires: 12/27/2017 12:51 PM 
 
4.  Enter the last four digits of your Social Security Number (xxxx) and Date of Birth (mm/dd/yyyy) as indicated and click Submit. You will be taken to the next sign-up page. 5. Create a [employer] ID. This will be your [employer] login ID and cannot be changed, so think of one that is secure and easy to remember. 6. Create a [employer] password. You can change your password at any time. 7. Enter your Password Reset Question and Answer. This can be used at a later time if you forget your password. 8. Enter your e-mail address. You will receive e-mail notification when new information is available in [address] 9. Click Sign Up. You can now view and download portions of your medical record. 10. Click the Download Summary menu link to download a portable copy of your medical information. If you have questions, please visit the Frequently Asked Questions section of the [employer] website. Remember, [employer] is NOT to be used for urgent needs. For medical emergencies, dial 911. Now available from your iPhone and Android! Please provide this summary of care documentation to your next provider. Your primary care clinician is listed as [address]. If you have any questions after today's visit, please call [phone].

## 2017-09-28 NOTE — PATIENT INSTRUCTIONS
Influenza (Flu) Vaccine (Live, Intranasal): What You Need to Know  Why get vaccinated? Influenza (\"flu\") is a contagious disease that spreads around the [place] every winter, usually between October and May. Flu is caused by influenza viruses, and is spread mainly by coughing, sneezing, and close contact. Anyone can get the flu. Flu strikes suddenly and can last several days. Symptoms vary by age, but can include:  · Fever/chills. · Sore throat. · Muscle aches. · Fatigue. · Cough. · Headache. · Runny or stuffy nose. Flu can also lead to pneumonia and blood infections, and cause diarrhea and seizures in children. If you have a medical condition, such as heart or lung disease, flu can make it worse. Flu can also lead to pneumonia and blood infections, and cause diarrhea and seizures in children. If you have a medical condition, such as heart or lung disease, flu can make it worse. Each year thousands of people in the [facility] die from flu, and many more are hospitalized. Flu vaccine can:   · Keep you from getting flu. · Make flu less severe if you do get it, and  · Keep you from spreading flu to your family and other people. Live, attenuated flu vaccine--LAIV, nasal spray  A dose of flu vaccine is recommended every flu season. Children younger than 5years of age may need two doses during the same flu season. Everyone else needs only one dose each flu season. The live, attenuated influenza vaccine (called LAIV) may be given to healthy, non-pregnant people 2 through 52years of age. It may safely be given at the same time as other vaccines. LAIV is sprayed into the nose. LAIV does not contain thimerosal or other preservatives. It is made from weakened flu virus and does not cause flu. There are many flu viruses, and they are always changing. Each year LAIV is made to protect against four viruses that are likely to cause disease in the upcoming flu season.  But even when the vaccine doesn't exactly match these viruses, it may still provide some protection. Flu vaccine cannot prevent:  · Flu that is caused by a virus not covered by the vaccine, or  · Illnesses that look like flu but are not. It takes about 2 weeks for protection to develop after vaccination, and protection lasts through the flu season. Some people should not get this vaccine  Some people should not get LAIV because of age, health conditions, or other reasons. Most of these people should get an injected flu vaccine instead. Your healthcare provider can help you decide. Tell the provider if you or the person being vaccinated:  · Have any allergies, including an allergy to eggs, or have ever had an allergic reaction to an influenza vaccine. · Have ever had Guillain-Barré Syndrome (also called GBS). · Have any long-term heart, breathing, kidney, liver, or nervous system problems. · Have asthma or breathing problems, or are a child who has had wheezing episodes. · Are pregnant. · Are a child or adolescent who is receiving aspirin or aspirin-containing products. · Have a weakened immune system. · Will be visiting or taking care of someone, within the next 7 days, who requires a protected environment (for example, following a bone marrow transplant)  Sometimes LAIV should be delayed. Tell the provider if you or the person being vaccinated:  · Are not feeling well. The vaccine could be delayed until you feel better. · Have gotten any other vaccines in the past 4 weeks. Live vaccines given too close together might not work as well. · Have taken influenza antiviral medication in the past 48 hours. · Have a very stuffy nose. Risks of a vaccine reaction  With any medicine, including vaccines, there is a chance of reactions. These are usually mild and go away on their own, but serious reactions are also possible. Most people who get LAIV do not have any problems with it.  Reactions to LAIV may resemble a very mild case of flu.  Problems that have been reported following LAIV:  Children and adolescents 317 years of age:   · Runny nose/nasal congestion  · Cough  · Fever  · Headache and muscle aches  · Wheezing abdominal pain, vomiting, or diarrhea  Adults 2549 years of age:   · Runny nose/nasal congestion  · Sore throat  · Cough  · Chills  · Tiredness/weakness  · Headache  Problems that could happen after any vaccine:  · Any medication can cause a severe allergic reaction. Such reactions from a vaccine are very rare, estimated at about 1 in a million doses, and would happen within a few minutes to a few hours after the vaccination. As with any medicine, there is a very remote chance of a vaccine causing a serious injury or death. The safety of vaccines is always being monitored. For more information, visit: www.cdc.gov/vaccinesafety/  What if there is a serious reaction? What should I look for? · Look for anything that concerns you, such as signs of a severe allergic reaction, very high fever, or unusual behavior. Signs of a severe allergic reaction can include hives, swelling of the face and throat, difficulty breathing, a fast heartbeat, dizziness, and weakness. These would start a few minutes to a few hours after the vaccination. What should I do? · If you think it is a severe allergic reaction or other emergency that can't wait, call 9-1-1 or get the person to the nearest hospital. Otherwise, call your doctor. · Reactions should be reported to the \"Vaccine Adverse Event Reporting System\" (VAERS). Your doctor should file this report, or you can do it yourself through the VAERS web site at www.vaers. hhs.gov, or by calling 4-231.746.4088. VAERS does not give medical advice. The National Vaccine Injury Compensation Program  The National Vaccine Injury Compensation Program (VICP) is a federal program that was created to compensate people who may have been injured by certain vaccines.   Persons who believe they may have been injured by a vaccine can learn about the program and about filing a claim by calling 2-971.657.1573 or visiting the [employer] website at www.[facility].gov/vaccinecompensation. There is a time limit to file a claim for compensation. How can I learn more? · Ask your doctor. He or she can give you the vaccine package insert or suggest other sources of information. · Call your local or state health department. · Contact the Centers for Disease Control and Prevention (CDC):  ¨ Call 6-273.643.1957 (1-800-CDC-INFO) or  ¨ Visit CDC's website at www.cdc.gov/flu  Vaccine Information Statement  Live Attenuated Influenza Vaccine  8/7/2015  42 DON Rahman Pap 633YB-30  Department of Health and Human Services  Centers for Disease Control and Prevention  Many Vaccine Information Statements are available in [place]ian and other languages. See www.immunize.org/vis. Muchas hojas de información sobre vacunas están disponibles en español y en otros idiomas. Visite www.immunize.org/vis.   Content Version: 22.8.386848

## 2017-09-28 NOTE — PROGRESS NOTES
1. Have you been to the ER, urgent care clinic since your last visit? Hospitalized since your last visit? Yes Where: ER - [facility] for high BP    2. Have you seen or consulted any other health care providers outside of the [address] [place] [address] since your last visit? Include any pap smears or colon screening.  No

## 2017-10-11 ENCOUNTER — TELEPHONE (OUTPATIENT)
Dept: FAMILY MEDICINE CLINIC | Age: 62
End: 2017-10-11

## 2017-10-11 NOTE — TELEPHONE ENCOUNTER
Pt called and needs pull up order faxed. Stated that home care delivery faxed over a form for Dr. Lili Maldonado signature 3 weeks ago after her last appointment. Pt phone [phone]. Pt is completely out of pull ups and said they are at the point they are about to switch doctors. Please advise.

## 2017-10-30 ENCOUNTER — TELEPHONE (OUTPATIENT)
Dept: FAMILY MEDICINE CLINIC | Age: 62
End: 2017-10-30

## 2017-10-30 NOTE — TELEPHONE ENCOUNTER
Spoke with the patient today. Advised patient that order has been faxed.  She verbalized understanding

## 2017-10-30 NOTE — TELEPHONE ENCOUNTER
[facility] calling upset re: has not heard from anyone for hospital bed and bedside commode they requested order be placed for.  Alberta Males it was requested x 2 months ago    [facility] order must be approved by pts insurance co [facility]

## 2017-12-07 DIAGNOSIS — N32.81 OAB (OVERACTIVE BLADDER): ICD-10-CM

## 2017-12-07 RX ORDER — ALBUTEROL SULFATE 90 UG/1
2 AEROSOL, METERED RESPIRATORY (INHALATION)
Qty: 1 INHALER | Refills: 3 | Status: SHIPPED | OUTPATIENT
Start: 2017-12-07

## 2017-12-07 RX ORDER — METFORMIN HYDROCHLORIDE 500 MG/1
TABLET ORAL
Qty: 30 TAB | Refills: 5 | Status: SHIPPED | OUTPATIENT
Start: 2017-12-07 | End: 2018-04-19

## 2017-12-07 RX ORDER — METFORMIN HYDROCHLORIDE 500 MG/1
TABLET ORAL
Qty: 30 TAB | Refills: 5 | Status: CANCELLED | OUTPATIENT
Start: 2017-12-07

## 2017-12-07 RX ORDER — TOLTERODINE TARTRATE 2 MG/1
TABLET, EXTENDED RELEASE ORAL
Qty: 60 TAB | Refills: 3 | Status: SHIPPED | OUTPATIENT
Start: 2017-12-07

## 2017-12-07 RX ORDER — ALBUTEROL SULFATE 90 UG/1
2 AEROSOL, METERED RESPIRATORY (INHALATION)
Qty: 1 INHALER | Refills: 3 | Status: CANCELLED | OUTPATIENT
Start: 2017-12-07

## 2017-12-07 NOTE — TELEPHONE ENCOUNTER
Pt called in requesting refill of her   Requested Prescriptions     Pending Prescriptions Disp Refills    metFORMIN (GLUCOPHAGE) 500 mg tablet 30 Tab 5    albuterol (PROVENTIL HFA, VENTOLIN HFA, PROAIR HFA) 90 mcg/actuation inhaler 1 Inhaler 3     Sig: Take 2 Puffs by inhalation every four (4) hours as needed for Wheezing. Merly Galeano with [address] wanted to inform the office that someone called the office and stated that they worked for Dr. Vane De Leon and was trying to get medication information on the patient. Per Rhonda Galeano this was very strange and he stated that he did not give out any information. Please be advised.

## 2017-12-22 ENCOUNTER — TELEPHONE (OUTPATIENT)
Dept: FAMILY MEDICINE CLINIC | Age: 62
End: 2017-12-22

## 2017-12-22 RX ORDER — ROSUVASTATIN CALCIUM 10 MG/1
10 TABLET, COATED ORAL
Qty: 90 TAB | Refills: 3 | Status: SHIPPED | OUTPATIENT
Start: 2017-12-22

## 2017-12-22 NOTE — TELEPHONE ENCOUNTER
----- Message from Prashanth George LPN sent at 63/27/1368  2:46 PM EST -----  Spoke with the pharmacy patient is taking Crestor.   ----- Message -----     From: Cheryl Garcia MD     Sent: 12/8/2017   1:03 PM       To: Prashanth George LPN    Can you find out if pt is on lipitor or crestor

## 2018-01-03 ENCOUNTER — TELEPHONE (OUTPATIENT)
Dept: FAMILY MEDICINE CLINIC | Age: 63
End: 2018-01-03

## 2018-01-03 NOTE — TELEPHONE ENCOUNTER
Karla Gill from [address] called to get a referral for wheelchairs parts through [facility].     Please call [employer] at [phone]      Per Karla Gill Ordering Provider: [facility]   Tax- [facility] Dr. Ayesha Emerson, [facility]    DX: [phone], [phone]       [facility] 4

## 2018-01-08 NOTE — TELEPHONE ENCOUNTER
Called [facility] to start referral process, pending MTUS#[id number] and fax [phone]. Ovi Bauman from [address] [place] [address] will fax all supporting documents to [facility] for clinical review.

## 2018-01-23 ENCOUNTER — TELEPHONE (OUTPATIENT)
Dept: FAMILY MEDICINE CLINIC | Age: 63
End: 2018-01-23

## 2018-01-23 NOTE — TELEPHONE ENCOUNTER
Authorization number given to [facility]. States they will check [employer] site and give information to [facility] for the wheelchair. Patient is aware.

## 2018-01-23 NOTE — TELEPHONE ENCOUNTER
Jayda Quesada        12:15 PM   Note      Apryl SOUZA:[id number] [id number] x 2  Start 01/8/2018  End 2/27/2018

## 2018-01-23 NOTE — TELEPHONE ENCOUNTER
Pt called to request the office to call [facility] regarding her wheelchair. Said the longer she waits for them to do something the more and more the chair falls apart. States she spoke with [employer] and they need a specific form faxed to them but pt doesn't know the name or type of form.

## 2018-04-19 ENCOUNTER — TELEPHONE (OUTPATIENT)
Dept: FAMILY MEDICINE CLINIC | Age: 63
End: 2018-04-19

## 2018-04-19 ENCOUNTER — HOSPITAL ENCOUNTER (OUTPATIENT)
Dept: LAB | Age: 63
Discharge: HOME | End: 2018-04-19

## 2018-04-19 ENCOUNTER — OFFICE VISIT (OUTPATIENT)
Dept: FAMILY MEDICINE CLINIC | Age: 63
End: 2018-04-19

## 2018-04-19 VITALS
WEIGHT: 187 LBS | TEMPERATURE: 98.1 F | HEIGHT: 59 IN | OXYGEN SATURATION: 97 % | DIASTOLIC BLOOD PRESSURE: 107 MMHG | BODY MASS INDEX: 37.7 KG/M2 | SYSTOLIC BLOOD PRESSURE: 206 MMHG | RESPIRATION RATE: 20 BRPM | HEART RATE: 76 BPM

## 2018-04-19 DIAGNOSIS — I69.359 CVA, OLD, HEMIPARESIS (HCC): ICD-10-CM

## 2018-04-19 DIAGNOSIS — M15.9 PRIMARY OSTEOARTHRITIS INVOLVING MULTIPLE JOINTS: ICD-10-CM

## 2018-04-19 DIAGNOSIS — E78.5 DYSLIPIDEMIA: ICD-10-CM

## 2018-04-19 DIAGNOSIS — Z12.31 ENCOUNTER FOR SCREENING MAMMOGRAM FOR BREAST CANCER: ICD-10-CM

## 2018-04-19 DIAGNOSIS — Z00.00 MEDICARE ANNUAL WELLNESS VISIT, SUBSEQUENT: Primary | ICD-10-CM

## 2018-04-19 DIAGNOSIS — I10 HTN (HYPERTENSION), BENIGN: ICD-10-CM

## 2018-04-19 DIAGNOSIS — E11.9 TYPE 2 DIABETES MELLITUS WITHOUT COMPLICATION, WITHOUT LONG-TERM CURRENT USE OF INSULIN (HCC): ICD-10-CM

## 2018-04-19 PROBLEM — E66.01 SEVERE OBESITY (BMI 35.0-39.9) WITH COMORBIDITY (HCC): Status: ACTIVE | Noted: 2018-04-19

## 2018-04-19 PROCEDURE — 99001 SPECIMEN HANDLING PT-LAB: CPT | Performed by: NURSE PRACTITIONER

## 2018-04-19 RX ORDER — AMLODIPINE BESYLATE 5 MG/1
5 TABLET ORAL DAILY
Qty: 30 TAB | Refills: 6 | Status: SHIPPED | OUTPATIENT
Start: 2018-04-19

## 2018-04-19 RX ORDER — METOPROLOL SUCCINATE 100 MG/1
100 TABLET, EXTENDED RELEASE ORAL DAILY
Qty: 30 TAB | Refills: 6 | Status: SHIPPED | OUTPATIENT
Start: 2018-04-19

## 2018-04-19 RX ORDER — TRAMADOL HYDROCHLORIDE 50 MG/1
50 TABLET ORAL
Qty: 30 TAB | Refills: 3 | Status: SHIPPED | OUTPATIENT
Start: 2018-04-19

## 2018-04-19 NOTE — MR AVS SNAPSHOT
[address] [facility], [place] 
 
 
 [address] [address] [address] [address] 
[phone] Patient: Yuridia Serrano MRN: [id number] OBF:[id number] Visit Information Date & Time Provider Department Dept. Phone Encounter #  
 4/19/2018 12:00 PM Chuck Sherman, [address] [place] [address] [id number] Upcoming Health Maintenance Date Due ZOSTER VACCINE AGE 60> 6/2/2015 EYE EXAM RETINAL OR DILATED Q1 7/14/2017 PAP AKA CERVICAL CYTOLOGY 5/29/2018 HEMOGLOBIN A1C Q6M 10/19/2018 BREAST CANCER SCRN MAMMOGRAM 3/1/2019 FOOT EXAM Q1 4/19/2019 MICROALBUMIN Q1 4/19/2019 LIPID PANEL Q1 4/19/2019 MEDICARE YEARLY EXAM 4/20/2019 DTaP/Tdap/Td series (2 - Td) 5/19/2026 COLONOSCOPY 4/19/2028 Allergies as of 4/19/2018  Review Complete On: 4/19/2018 By: Pam Uribe LPN Severity Noted Reaction Type Reactions Other Food  10/29/2013    Other (comments) Seafood - intolerance Penicillins  07/02/2013    Diarrhea Current Immunizations  Reviewed on 4/19/2018 Name Date Influenza Vaccine 10/19/2015 Influenza Vaccine (Quad) PF 9/28/2017, 10/19/2016 Pneumococcal Conjugate (PCV-13) 5/19/2016 TB Skin Test (PPD) Intradermal 9/9/2013 10:40 AM, 9/3/2013  3:35 PM  
 Tdap 5/19/2016 Reviewed by Chuck Sherman NP on 4/19/2018 at 12:54 PM  
You Were Diagnosed With   
  
 Codes Comments Medicare annual wellness visit, subsequent    -  Primary ICD-10-CM: Z00.00 ICD-9-CM: V70.0   
 HTN (hypertension), benign     ICD-10-CM: I10 
ICD-9-CM: 401.1 Dyslipidemia     ICD-10-CM: E78.5 ICD-9-CM: 272.4   
 CVA, old, hemiparesis ([facility] [address]     ICD-10-CM: S83.156 ICD-9-CM: 438.20 Type 2 diabetes mellitus without complication, without long-term current use of insulin (HCC)     ICD-10-CM: E11.9 ICD-9-CM: 250.00  Encounter for screening mammogram for breast cancer     ICD-10-CM: Z12.31 
ICD-9-CM: V76.12   
 Primary osteoarthritis involving multiple joints     ICD-10-CM: M15.0 ICD-9-CM: 715.09 Vitals BP Pulse Temp Resp Height(growth percentile) Weight(growth percentile) (!) 206/107 (BP 1 Location: Left arm, BP Patient Position: Sitting) 76 98.1 °F (36.7 °C) (Oral) 20 4' 11\" (1.499 m) 187 lb (84.8 kg) SpO2 BMI OB Status Smoking Status 97% 37.77 kg/m2 Postmenopausal Former Smoker BMI and BSA Data Body Mass Index Body Surface Area  
 37.77 kg/m 2 1.88 m 2 Preferred Pharmacy Pharmacy Name Phone [facility], [address] [phone] Your Updated Medication List  
  
   
This list is accurate as of 4/19/18  1:16 PM.  Always use your most recent med list.  
  
  
  
  
 acetaminophen 500 mg tablet Commonly known as:  80 Jr Sabrina Cummings Se Take 1 Tab by mouth every six (6) hours as needed for Pain. alcohol swabs Padm Commonly known as:  BD Single Use Swabs Regular Check glucose once daily. Diabetes Mellitus Type 2 E11.9  
  
 amLODIPine 5 mg tablet Commonly known as:  Stephanie Medina Take 1 Tab by mouth daily. aspirin delayed-release 81 mg tablet TAKE ONE TABLET EVERY DAY Blood-Glucose Meter Misc Commonly known as:  Lesli Luna Check glucose once daily. Please dispense Accu-check Renetta Smartview Meter Diabetes Mellitus Type 2 E11.9  
  
 glucose blood VI test strips strip Commonly known as:  [address] Check glucose once daily Diabetes Mellitus Type 2 E11.9 Lancets Misc Commonly known as:  ACCU-CHEK FASTCLIX Check glucose once daily Diabetes Mellitus Type 2 E11.9  
  
 losartan-hydroCHLOROthiazide 100-25 mg per tablet Commonly known as:  HYZAAR  
TAKE ONE TABLET EVERY DAY  
  
 metoprolol succinate 100 mg tablet Commonly known as:  TOPROL-XL Take 1 Tab by mouth daily. mometasone 50 mcg/actuation nasal spray Commonly known as:  Lucien Chase 2 sprays by Both Nostrils route daily. mupirocin calcium 2 % topical cream  
Commonly known as:  [employer] Apply  to affected area two (2) times a day. PARoxetine 20 mg tablet Commonly known as:  PAXIL TAKE ONE TABLET EVERY DAY  
  
 rosuvastatin 10 mg tablet Commonly known as:  CRESTOR Take 1 Tab by mouth nightly. * tolterodine 2 mg tablet Commonly known as:  DETROL  
TAKE ONE TABLET TWO TIMES A DAY * tolterodine 2 mg tablet Commonly known as:  DETROL  
TAKE ONE TABLET TWO TIMES A DAY  
  
 traMADol 50 mg tablet Commonly known as:  ULTRAM  
Take 1 Tab by mouth every six (6) hours as needed for Pain. Max Daily Amount: 200 mg.  
  
 varicella zoster vaccine live 19,400 unit/0.65 mL Susr injection Commonly known as:  ZOSTAVAX  
1 Vial by SubCUTAneous route once for 1 dose. * albuterol 90 mcg/actuation inhaler Commonly known as:  PROVENTIL HFA, VENTOLIN HFA, PROAIR HFA Take 2 Puffs by inhalation every four (4) hours as needed for Wheezing. * VENTOLIN HFA 90 mcg/actuation inhaler Generic drug:  albuterol ONH 2 PUFFS EVERY 4 HOURS AS NEEDED FOR WHEEZING  
  
 * Notice: This list has 4 medication(s) that are the same as other medications prescribed for you. Read the directions carefully, and ask your doctor or other care provider to review them with you. Prescriptions Printed Refills  
 varicella zoster vaccine live (ZOSTAVAX) 19,400 unit/0.65 mL susr injection 0 Si Vial by SubCUTAneous route once for 1 dose. Class: Print Route: SubCUTAneous  
 traMADol (ULTRAM) 50 mg tablet 3 Sig: Take 1 Tab by mouth every six (6) hours as needed for Pain. Max Daily Amount: 200 mg. Class: Print Route: Oral  
  
Prescriptions Sent to Pharmacy Refills  
 metoprolol succinate (TOPROL-XL) 100 mg tablet 6 Sig: Take 1 Tab by mouth daily.   
 Class: Normal  
 Pharmacy: [facility] [place], [address] Ph #: [phone] Route: Oral  
 amLODIPine (NORVASC) 5 mg tablet 6 Sig: Take 1 Tab by mouth daily. Class: Normal  
 Pharmacy: [facility] Ph #: [phone] Route: Oral  
  
To-Do List   
 04/19/2018 Lab:  HEMOGLOBIN A1C WITH EAG   
  
 04/19/2018 Lab:  LIPID PANEL   
  
 04/19/2018 Imaging:  NISH MAMMO BI SCREENING INCL CAD   
  
 04/19/2018 Lab:  MICROALBUMIN, UR, RAND W/ MICROALB/CREAT RATIO Patient Instructions Medicare Part B Preventive Services Limitations Recommendation Scheduled Bone Mass Measurement 
(age 72 & older, biennial) Requires diagnosis related to osteoporosis or estrogen deficiency. Biennial benefit unless patient has history of long-term glucocorticoid tx or baseline is needed because initial test was by other method  n/a Cardiovascular Screening Blood Tests (every 5 years) Total cholesterol, HDL, Triglycerides Order as a panel if possible  2/20/2017 Colorectal Cancer Screening 
-Fecal occult blood test (annual) -Flexible sigmoidoscopy (5y) 
-Screening colonoscopy (10y) -Barium Enema   n/a Counseling to Prevent Tobacco Use (up to 8 sessions per year) - Counseling greater than 3 and up to 10 minutes - Counseling greater than 10 minutes Patients must be asymptomatic of tobacco-related conditions to receive as preventive service  n/a Diabetes Screening Tests (at least every 3 years, Medicare covers annually or at 6-month intervals for prediabetic patients) Fasting blood sugar (FBS) or glucose tolerance test (GTT) Patient must be diagnosed with one of the following: 
-Hypertension, Dyslipidemia, obesity, previous impaired FBS or GTT 
Or any two of the following: overweight, FH of diabetes, age ? 72, history of gestational diabetes, birth of baby weighing more than 9 pounds  2/20/17 Diabetes Self-Management Training (DSMT) (no USPSTF recommendation) Requires referral by treating physician for patient with diabetes or renal disease. 10 hours of initial DSMT session of no less than 30 minutes each in a continuous 12-month period. 2 hours of follow-up DSMT in subsequent years. n/a Glaucoma Screening (no USPSTF recommendation) Diabetes mellitus, family history, , age 48 or over,  American, age 72 or over  7/14/16 Human Immunodeficiency Virus (HIV) Screening (annually for increased risk patients) HIV-1 and HIV-2 by EIA, DAVID, rapid antibody test, or oral mucosa transudate Patient must be at increased risk for HIV infection per USPSTF guidelines or pregnant. Tests covered annually for patients at increased risk. Pregnant patients may receive up to 3 test during pregnancy. n/a Medical Nutrition Therapy (MNT) (for diabetes or renal disease not recommended schedule) Requires referral by treating physician for patient with diabetes or renal disease. Can be provided in same year as diabetes self-management training (DSMT), and CMS recommends medical nutrition therapy take place after DSMT. Up to 3 hours for initial year and 2 hours in subsequent years. n/a Shingles Vaccination A shingles vaccine is also recommended once in a lifetime after age 56  n/a Seasonal Influenza Vaccination (annually)   9/28/17 Pneumococcal Vaccination (once after 65)   PNEUMO 5/19/16 Hepatitis B Vaccinations (if medium/high risk) Medium/high risk factors:  End-stage renal disease, Hemophiliacs who received Factor VIII or IX concentrates, Clients of institutions for the mentally retarded, Persons who live in the same house as a HepB virus carrier, Homosexual men, Illicit injectable drug abusers. N/A Screening Mammography (biennial age 54-69) Annually (age 36 or over)  3/1/17 Screening Pap Tests and Pelvic Examination (up to age 79 and after 79 if unknown history or abnormal study last 10 years) Every 24 months except high risk  5/29/15 Ultrasound Screening for Abdominal Aortic Aneurysm (AAA) (once) Patient must be referred through [facility] and not have had a screening for abdominal aortic aneurysm before under Medicare. Limited to patients who meet one of the following criteria: 
- Men who are 73-68 years old and have smoked more than 100 cigarettes in their lifetime. 
-Anyone with a FH of AAA 
-Anyone recommended for screening by USPSTF  N/A Medicare Wellness Visit, Female The best way to live healthy is to have a healthy lifestyle by eating a well-balanced diet, exercising regularly, limiting alcohol and stopping smoking. Regular physical exams and screening tests are another way to keep healthy. Preventive exams provided by your health care provider can find health problems before they become diseases or illnesses. Preventive services including immunizations, screening tests, monitoring and exams can help you take care of your own health. All people over age 72 should have a pneumovax  and and a prevnar shot to prevent pneumonia. These are once in a lifetime unless you and your provider decide differently. All people over 65 should have a yearly flu shot and a tetanus vaccine every 10 years. A bone mass density to screen for osteoporosis or thinning of the bones should be done every 2 years after 65. Screening for diabetes mellitus with a blood sugar test should be done every year. Glaucoma is a disease of the eye due to increased ocular pressure that can lead to blindness and it should be done every year by an eye professional. 
 
Cardiovascular screening tests that check for elevated lipids (fatty part of blood) which can lead to heart disease and strokes should be done every 5 years. Colorectal screening that evaluates for blood or polyps in your colon should be done yearly as a stool test or every five years as a flexible sigmoidoscope or every 10 years as a colonoscopy up to age 76. Breast cancer screening with a mammogram is recommended biennially  for women age 54-69. Screening for cervical cancer with a pap smear and pelvic exam is recommended for women after age 72 years every 2 years up to age 79 or when the provider and patient decide to stop. If there is a history of cervical abnormalities or other increased risk for cancer then the test is recommended yearly. Hepatitis C screening is also recommended for anyone born between 80 through [address]. A shingles vaccine is also recommended once in a lifetime after age 61. Your Medicare Wellness Exam is recommended annually. Here is a list of your current Health Maintenance items with a due date: 
Health Maintenance Due Topic Date Due  Shingles Vaccine  06/02/2015 [facility] Eye Exam  07/14/2017  Cervical Cancer Screening  05/29/2018 Introducing [facility]! [employer] introduces [employer] patient portal. Now you can access parts of your medical record, email your doctor's office, and request medication refills online. 1. In your internet browser, go to [email or web address] 2. Click on the First Time User? Click Here link in the Sign In box. You will see the New Member Sign Up page. 3. Enter your [facility] Access Code exactly as it appears below. You will not need to use this code after youve completed the sign-up process. If you do not sign up before the expiration date, you must request a new code. · [facility] Access Code: [email or web address] Expires: 7/18/2018 12:09 PM 
 
4. Enter the last four digits of your Social Security Number (xxxx) and Date of Birth (mm/dd/yyyy) as indicated and click Submit. You will be taken to the next sign-up page. 5. Create a [facility] ID. This will be your [facility] login ID and cannot be changed, so think of one that is secure and easy to remember. 6. Create a [facility] password. You can change your password at any time. 7. Enter your Password Reset Question and Answer. This can be used at a later time if you forget your password. 8. Enter your e-mail address. You will receive e-mail notification when new information is available in [address] 9. Click Sign Up. You can now view and download portions of your medical record. 10. Click the Download Summary menu link to download a portable copy of your medical information. If you have questions, please visit the Frequently Asked Questions section of the [employer] website. Remember, [employer] is NOT to be used for urgent needs. For medical emergencies, dial 911. Now available from your iPhone and Android! Please provide this summary of care documentation to your next provider. Your primary care clinician is listed as [address]. If you have any questions after today's visit, please call [phone].

## 2018-04-19 NOTE — PATIENT INSTRUCTIONS
Medicare Part B Preventive Services Limitations Recommendation Scheduled   Bone Mass Measurement  (age 72 & older, biennial) Requires diagnosis related to osteoporosis or estrogen deficiency. Biennial benefit unless patient has history of long-term glucocorticoid tx or baseline is needed because initial test was by other method  n/a   Cardiovascular Screening Blood Tests (every 5 years)  Total cholesterol, HDL, Triglycerides Order as a panel if possible  2/20/2017   Colorectal Cancer Screening  -Fecal occult blood test (annual)  -Flexible sigmoidoscopy (5y)  -Screening colonoscopy (10y)  -Barium Enema   n/a   Counseling to Prevent Tobacco Use (up to 8 sessions per year)  - Counseling greater than 3 and up to 10 minutes  - Counseling greater than 10 minutes Patients must be asymptomatic of tobacco-related conditions to receive as preventive service  n/a   Diabetes Screening Tests (at least every 3 years, Medicare covers annually or at 6-month intervals for prediabetic patients)    Fasting blood sugar (FBS) or glucose tolerance test (GTT) Patient must be diagnosed with one of the following:  -Hypertension, Dyslipidemia, obesity, previous impaired FBS or GTT  Or any two of the following: overweight, FH of diabetes, age ? 72, history of gestational diabetes, birth of baby weighing more than 9 pounds  2/20/17   Diabetes Self-Management Training (DSMT) (no USPSTF recommendation) Requires referral by treating physician for patient with diabetes or renal disease. 10 hours of initial DSMT session of no less than 30 minutes each in a continuous 12-month period. 2 hours of follow-up DSMT in subsequent years.   n/a   Glaucoma Screening (no USPSTF recommendation) Diabetes mellitus, family history, , age 48 or over,  American, age 72 or over  7/14/16   Human Immunodeficiency Virus (HIV) Screening (annually for increased risk patients)  HIV-1 and HIV-2 by EIA, DAVID, rapid antibody test, or oral mucosa transudate Patient must be at increased risk for HIV infection per USPSTF guidelines or pregnant. Tests covered annually for patients at increased risk. Pregnant patients may receive up to 3 test during pregnancy. n/a   Medical Nutrition Therapy (MNT) (for diabetes or renal disease not recommended schedule) Requires referral by treating physician for patient with diabetes or renal disease. Can be provided in same year as diabetes self-management training (DSMT), and CMS recommends medical nutrition therapy take place after DSMT. Up to 3 hours for initial year and 2 hours in subsequent years. n/a   Shingles Vaccination A shingles vaccine is also recommended once in a lifetime after age 61  n/a   Seasonal Influenza Vaccination (annually)   9/28/17   Pneumococcal Vaccination (once after 72)   PNEUMO  5/19/16   Hepatitis B Vaccinations (if medium/high risk) Medium/high risk factors:  End-stage renal disease,  Hemophiliacs who received Factor VIII or IX concentrates, Clients of institutions for the mentally retarded, Persons who live in the same house as a HepB virus carrier, Homosexual men, Illicit injectable drug abusers. N/A   Screening Mammography (biennial age 54-69) Annually (age 36 or over)  3/1/17   Screening Pap Tests and Pelvic Examination (up to age 79 and after 79 if unknown history or abnormal study last 10 years) Every 25 months except high risk  5/29/15   Ultrasound Screening for Abdominal Aortic Aneurysm (AAA) (once) Patient must be referred through [facility] and not have had a screening for abdominal aortic aneurysm before under Medicare.   Limited to patients who meet one of the following criteria:  - Men who are 73-68 years old and have smoked more than 100 cigarettes in their lifetime.  -Anyone with a FH of AAA  -Anyone recommended for screening by USPSTF  N/A         Medicare Wellness Visit, Female    The best way to live healthy is to have a healthy lifestyle by eating a well-balanced diet, exercising regularly, limiting alcohol and stopping smoking. Regular physical exams and screening tests are another way to keep healthy. Preventive exams provided by your health care provider can find health problems before they become diseases or illnesses. Preventive services including immunizations, screening tests, monitoring and exams can help you take care of your own health. All people over age 72 should have a pneumovax  and and a prevnar shot to prevent pneumonia. These are once in a lifetime unless you and your provider decide differently. All people over 65 should have a yearly flu shot and a tetanus vaccine every 10 years. A bone mass density to screen for osteoporosis or thinning of the bones should be done every 2 years after 65. Screening for diabetes mellitus with a blood sugar test should be done every year. Glaucoma is a disease of the eye due to increased ocular pressure that can lead to blindness and it should be done every year by an eye professional.    Cardiovascular screening tests that check for elevated lipids (fatty part of blood) which can lead to heart disease and strokes should be done every 5 years. Colorectal screening that evaluates for blood or polyps in your colon should be done yearly as a stool test or every five years as a flexible sigmoidoscope or every 10 years as a colonoscopy up to age 76. Breast cancer screening with a mammogram is recommended biennially  for women age 54-69. Screening for cervical cancer with a pap smear and pelvic exam is recommended for women after age 72 years every 2 years up to age 79 or when the provider and patient decide to stop. If there is a history of cervical abnormalities or other increased risk for cancer then the test is recommended yearly. Hepatitis C screening is also recommended for anyone born between 80 through [address]. A shingles vaccine is also recommended once in a lifetime after age 61.     Your Medicare Wellness Exam is recommended annually.     Here is a list of your current Health Maintenance items with a due date:  Health Maintenance Due   Topic Date Due    Shingles Vaccine  06/02/2015    Eye Exam  07/14/2017    Cervical Cancer Screening  05/29/2018

## 2018-04-19 NOTE — PROGRESS NOTES
Patient is in the office today for Medicare well check    1. Have you been to the ER, urgent care clinic since your last visit? Hospitalized since your last visit? No    2. Have you seen or consulted any other health care providers outside of the [address] [place] [address] since your last visit? Include any pap smears or colon screening. No    Pt ran out of her medication for her blood pressure. Pt asked for a refill on her tramadol. This is the Subsequent Medicare Annual Wellness Exam, performed 12 months or more after the Initial AWV or the last Subsequent AWV    I have reviewed the patient's medical history in detail and updated the computerized patient record. History     Past Medical History:   Diagnosis Date    Arthritis     CVA (cerebral vascular accident) ([facility] 11/2010    Left side paralysis    Diabetes ([facility]     Dyslipidemia 11/27/2012    Hypertension     Osteoarthritis 11/1/2012    Overactive bladder 12/13/2012    Psychiatric disorder     Anxiety    Type 2 diabetes mellitus ([facility] 9/12/2012      Past Surgical History:   Procedure Laterality Date    HX DILATION AND CURETTAGE       Current Outpatient Prescriptions   Medication Sig Dispense Refill    rosuvastatin (CRESTOR) 10 mg tablet Take 1 Tab by mouth nightly. 90 Tab 3    VENTOLIN HFA 90 mcg/actuation inhaler ONH 2 PUFFS EVERY 4 HOURS AS NEEDED FOR WHEEZING 1 Inhaler 2    PARoxetine (PAXIL) 20 mg tablet TAKE ONE TABLET EVERY DAY 30 Tab 6    tolterodine (DETROL) 2 mg tablet TAKE ONE TABLET TWO TIMES A DAY 60 Tab 3    albuterol (PROVENTIL HFA, VENTOLIN HFA, PROAIR HFA) 90 mcg/actuation inhaler Take 2 Puffs by inhalation every four (4) hours as needed for Wheezing. 1 Inhaler 3    losartan-hydroCHLOROthiazide (HYZAAR) 100-25 mg per tablet TAKE ONE TABLET EVERY DAY 30 Tab 6    tolterodine (DETROL) 2 mg tablet TAKE ONE TABLET TWO TIMES A DAY 60 Tab 6    metoprolol succinate (TOPROL-XL) 100 mg tablet Take 1 Tab by mouth daily.  30 Tab 6    amLODIPine (NORVASC) 5 mg tablet Take 1 Tab by mouth daily. 30 Tab 6    traMADol (ULTRAM) 50 mg tablet Take 1 Tab by mouth every six (6) hours as needed for Pain. Max Daily Amount: 200 mg. 30 Tab 3    mupirocin calcium (BACTROBAN) 2 % topical cream Apply  to affected area two (2) times a day. 15 g 1    glucose blood VI test strips (ACCU-CHEK SMARTVIEW TEST STRIP) strip Check glucose once daily Diabetes Mellitus Type 2 E11.9 100 Strip 3    Blood-Glucose Meter (ACCU-CHEK KATHYA) misc Check glucose once daily. Please dispense Accu-check Kathya Smartview Meter Diabetes Mellitus Type 2 E11.9 1 Each 1    Lancets (ACCU-CHEK FASTCLIX) misc Check glucose once daily Diabetes Mellitus Type 2 E11.9 100 Each 3    alcohol swabs (BD SINGLE USE SWABS REGULAR) padm Check glucose once daily. Diabetes Mellitus Type 2 E11.9 100 Pad 3    aspirin delayed-release 81 mg tablet TAKE ONE TABLET EVERY DAY 30 Tab 5    mometasone (NASONEX) 50 mcg/actuation nasal spray 2 sprays by Both Nostrils route daily. 1 Container 3    acetaminophen (TYLENOL EXTRA STRENGTH) 500 mg tablet Take 1 Tab by mouth every six (6) hours as needed for Pain.  30 Tab 4    metFORMIN (GLUCOPHAGE) 500 mg tablet TAKE ONE TABLET EVERY DAY WITH BREAKFAST 30 Tab 2    metFORMIN (GLUCOPHAGE) 500 mg tablet TAKE ONE TABLET EVERY DAY WITH BREAKFAST 30 Tab 5     Allergies   Allergen Reactions    Other Food Other (comments)     Seafood - intolerance    Penicillins Diarrhea     Family History   Problem Relation Age of Onset    Diabetes Mother     Diabetes Sister      Social History   Substance Use Topics    Smoking status: Former Smoker     Types: Cigarettes     Quit date: 9/19/2016    Smokeless tobacco: Never Used    Alcohol use No     Patient Active Problem List   Diagnosis Code    Type 2 diabetes mellitus (HCC) E11.9    Hypertension I10    Osteoarthritis M19.90    CVA (cerebrovascular accident) ([facility] [facility] I63.9    Dyslipidemia E78.5    Depression F32.9    Overactive bladder N32.81    Diabetes mellitus due to underlying condition without complications ([facility] [address] [facility] V88.4    Advanced care planning/counseling discussion Z71.89       Depression Risk Factor Screening:   No flowsheet data found. Alcohol Risk Factor Screening: You do not drink alcohol or very rarely. Functional Ability and Level of Safety:   Hearing Loss  Hearing is good. Activities of Daily Living  The home contains: Patient needs help with:  phone, transportation, shopping, laundry, housework, managing medications, dressing, bathing, hygiene, bathroom needs and walking    Fall Risk  No flowsheet data found. Abuse Screen  Patient is not abused    Cognitive Screening   Evaluation of Cognitive Function:  Has your family/caregiver stated any concerns about your memory: no  Normal    Patient Care Team   Patient Care Team:  Maximino Conrad MD as PCP - General (Family Practice)  Milan Smith NP as Ambulatory Care Navigator    Assessment/Plan   Education and counseling provided:  Are appropriate based on today's review and evaluation    Diagnoses and all orders for this visit:    1. Medicare annual wellness visit, subsequent      Health Maintenance Due   Topic Date Due    COLONOSCOPY  08/02/1973    ZOSTER VACCINE AGE 60>  06/02/2015    EYE EXAM RETINAL OR DILATED Q1  07/14/2017    HEMOGLOBIN A1C Q6M  08/20/2017    FOOT EXAM Q1  02/20/2018    MICROALBUMIN Q1  02/20/2018    LIPID PANEL Q1  02/20/2018    MEDICARE YEARLY EXAM  03/28/2018    PAP AKA CERVICAL CYTOLOGY  05/29/2018     Diagnoses and all orders for this visit:    Medicare annual wellness visit, subsequent  -     varicella zoster vaccine live (ZOSTAVAX) 19,400 unit/0.65 mL susr injection; 1 Vial by SubCUTAneous route once for 1 dose., Print, Disp-0.65 mL, R-0    HTN (hypertension), benign  -     metoprolol succinate (TOPROL-XL) 100 mg tablet; Take 1 Tab by mouth daily. , Normal, Disp-30 Tab, R-6  -     amLODIPine (NORVASC) 5 mg tablet; Take 1 Tab by mouth daily. , Normal, Disp-30 Tab, R-6    Dyslipidemia  -     LIPID PANEL; Future  -     LIPID PANEL    CVA, old, hemiparesis ([address]    Type 2 diabetes mellitus without complication, without long-term current use of insulin (HCC)  -     MICROALBUMIN, UR, RAND W/ MICROALB/CREAT RATIO; Future  -     HEMOGLOBIN A1C WITH EAG; Future  -     MICROALBUMIN, UR, RAND W/ MICROALB/CREAT RATIO  -     HEMOGLOBIN A1C WITH EAG    Encounter for screening mammogram for breast cancer  -     [facility] MAMMO BI SCREENING INCL CAD; Future    Primary osteoarthritis involving multiple joints  -     traMADol (ULTRAM) 50 mg tablet; Take 1 Tab by mouth every six (6) hours as needed for Pain. Max Daily Amount: 200 mg., Print, Disp-30 Tab, R-3      PLAN:  We discussed her medications. Pt ran out of her medications a couple of weeks ago. We discussed screening recommendations and recommended vaccines. Pt was asked to ask the eye doctor to send us his results. Pt was given ACP for review. Pt was given after visit summary.

## 2018-04-19 NOTE — TELEPHONE ENCOUNTER
Oksana with [facility] calling to request medication for shingles rash.  Stated pt forgot to ask at ov today 04/19/2018    Stated she scratches all the time and really needs some cream or powder or both     pharmacy is [address]

## 2018-04-20 LAB
ALBUMIN/CREAT UR: 6.9 MG/G CREAT (ref 0–30)
CHOLEST SERPL-MCNC: 221 MG/DL (ref 100–199)
CREAT UR-MCNC: 212.2 MG/DL
EST. AVERAGE GLUCOSE BLD GHB EST-MCNC: 128 MG/DL
HBA1C MFR BLD: 6.1 % (ref 4.8–5.6)
HDLC SERPL-MCNC: 73 MG/DL
LDLC SERPL CALC-MCNC: 125 MG/DL (ref 0–99)
MICROALBUMIN UR-MCNC: 14.6 UG/ML
TRIGL SERPL-MCNC: 115 MG/DL (ref 0–149)
VLDLC SERPL CALC-MCNC: 23 MG/DL (ref 5–40)

## 2018-04-27 NOTE — TELEPHONE ENCOUNTER
Patient caregiver calling in for a prescription for patient relating to her skin breakout. Patient was recently seen but forgot to ask provider for a treatment for this. Caregiver describes skin as being itchy, flakey, red, and bruised. Caretaker requesting a response as soon as possible.  Please advise

## 2018-04-29 NOTE — PROGRESS NOTES
Please advise Pt taht her microalbumin is fine. Her HgbA1C is 6.1 which is good. Her total cholesterol is 221 and her LDL is 125. Remind her to continue to watch her diet and continue to exercise.

## 2018-05-02 RX ORDER — HYDROCORTISONE 10 MG/ML
LOTION TOPICAL 2 TIMES DAILY
Qty: 15 G | Refills: 0 | Status: SHIPPED | OUTPATIENT
Start: 2018-05-02 | End: 2018-05-03 | Stop reason: SDUPTHER

## 2018-05-03 RX ORDER — HYDROCORTISONE 10 MG/ML
LOTION TOPICAL 2 TIMES DAILY
Qty: 15 G | Refills: 0 | Status: SHIPPED | OUTPATIENT
Start: 2018-05-03

## 2018-05-07 ENCOUNTER — HOSPITAL ENCOUNTER (OUTPATIENT)
Dept: MAMMOGRAPHY | Age: 63
Discharge: HOME | End: 2018-05-07
Attending: NURSE PRACTITIONER
Payer: MEDICARE

## 2018-05-07 DIAGNOSIS — Z12.31 ENCOUNTER FOR SCREENING MAMMOGRAM FOR BREAST CANCER: ICD-10-CM

## 2018-05-07 PROCEDURE — 77067 SCR MAMMO BI INCL CAD: CPT

## 2018-05-08 ENCOUNTER — TELEPHONE (OUTPATIENT)
Dept: FAMILY MEDICINE CLINIC | Age: 63
End: 2018-05-08

## 2018-05-08 NOTE — TELEPHONE ENCOUNTER
----- Message from Evelio Lloyd NP sent at 5/7/2018  4:50 PM EDT -----  Please advise Pt that her mammogram was normal.

## 2018-05-09 NOTE — TELEPHONE ENCOUNTER
I called Pt in regards to Rx refill. Informed Pt that Rx was refilled and sent to the pharmacy. Pt verbalized understanding.

## 2018-06-05 ENCOUNTER — TELEPHONE (OUTPATIENT)
Dept: FAMILY MEDICINE CLINIC | Age: 63
End: 2018-06-05

## 2018-06-05 NOTE — TELEPHONE ENCOUNTER
Arlene Platt called from [facility] regarding the patients LE swelling. Stated that she has has LT LE swelling but since Roberto 6/3 the swelling is now in both legs and feet. Last seen in office 4/19/18.    Requesting a call back

## 2018-07-18 DIAGNOSIS — E11.9 TYPE 2 DIABETES MELLITUS WITHOUT COMPLICATION, WITHOUT LONG-TERM CURRENT USE OF INSULIN (HCC): Primary | ICD-10-CM

## 2018-08-27 ENCOUNTER — TELEPHONE (OUTPATIENT)
Dept: FAMILY MEDICINE CLINIC | Age: 63
End: 2018-08-27

## 2018-08-27 NOTE — TELEPHONE ENCOUNTER
monie calling with [facility] to check the status of a physicians order for incontinence supplies.  Will fax another copy over

## 2018-08-28 NOTE — TELEPHONE ENCOUNTER
Spoke with Julianne at [facility] to confirm if order for incontinence supplies was received. Julianne verbalized understanding and stated order was received yesterday.

## 2018-09-20 ENCOUNTER — TELEPHONE (OUTPATIENT)
Dept: FAMILY MEDICINE CLINIC | Age: 63
End: 2018-09-20

## 2018-09-20 NOTE — TELEPHONE ENCOUNTER
Spoke with patient to contact [facility] for chair repair and [facility] will contact Dr. Adolfo Maya for any needed information. Patient verbalized understanding.

## 2018-09-20 NOTE — TELEPHONE ENCOUNTER
Pt called stating that her wheelchair is not working on her chair the joystick the light keep blinking and she said she need us to call  [facility] [phone] she stated now with [facility] you have to fax something over she stated need her chair service.  Please advise [phone]

## 2019-01-30 ENCOUNTER — HOSPITAL ENCOUNTER (OUTPATIENT)
Dept: CT IMAGING | Age: 64
Discharge: HOME | End: 2019-01-30
Attending: INTERNAL MEDICINE
Payer: MEDICARE

## 2019-01-30 DIAGNOSIS — R91.1 PULMONARY NODULE: ICD-10-CM

## 2019-01-30 LAB — CREAT UR-MCNC: 0.5 MG/DL (ref 0.6–1.3)

## 2019-01-30 PROCEDURE — 82565 ASSAY OF CREATININE: CPT

## 2019-01-30 PROCEDURE — 71260 CT THORAX DX C+: CPT

## 2019-01-30 PROCEDURE — 74011636320 HC RX REV CODE- 636/320

## 2019-01-30 RX ADMIN — IOPAMIDOL 75 ML: 612 INJECTION, SOLUTION INTRAVENOUS at 11:13

## 2019-04-11 RX ORDER — ROSUVASTATIN CALCIUM 10 MG/1
TABLET, COATED ORAL
Qty: 90 TAB | Refills: 2 | OUTPATIENT
Start: 2019-04-11

## 2019-04-11 NOTE — TELEPHONE ENCOUNTER
Spoke with patient to inform that Dr. Sukh Rangel received request from [address] [place] [address] to refill Crestor. Patient stated that patient goes to [facility] now instead of Dr. Sukh Rangel. Informed to contact pharmacy and inform that request was sent to wrong provider. Patient verbalized understanding.

## 2019-05-09 ENCOUNTER — HOSPITAL ENCOUNTER (OUTPATIENT)
Dept: MAMMOGRAPHY | Age: 64
Discharge: HOME | End: 2019-05-09
Attending: INTERNAL MEDICINE
Payer: MEDICARE

## 2019-05-09 DIAGNOSIS — Z12.31 VISIT FOR SCREENING MAMMOGRAM: ICD-10-CM

## 2019-05-09 PROCEDURE — 77067 SCR MAMMO BI INCL CAD: CPT

## 2020-11-18 NOTE — PROGRESS NOTES
1. Have you been to the ER, urgent care clinic since your last visit? Hospitalized since your last visit? No    2. Have you seen or consulted any other health care providers outside of the [address] [place] [address] since your last visit? Include any pap smears or colon screening.  No PROVIDER:[TOKEN:[[id number]],FOLLOWUP:[1-3 Days]],PROVIDER:[TOKEN:[[id number]],FOLLOWUP:[1-3 Days]],PROVIDER:[TOKEN:[[id number]],FOLLOWUP:[1-3 Days]]

## 2021-05-26 ENCOUNTER — TRANSCRIBE ORDER (OUTPATIENT)
Dept: SCHEDULING | Age: 66
End: 2021-05-26

## 2021-05-26 DIAGNOSIS — R94.5 NONSPECIFIC ABNORMAL RESULTS OF LIVER FUNCTION STUDY: Primary | ICD-10-CM

## 2021-06-03 ENCOUNTER — HOSPITAL ENCOUNTER (OUTPATIENT)
Dept: ULTRASOUND IMAGING | Age: 66
Discharge: HOME | End: 2021-06-03
Attending: FAMILY MEDICINE
Payer: MEDICARE

## 2021-06-03 DIAGNOSIS — R94.5 NONSPECIFIC ABNORMAL RESULTS OF LIVER FUNCTION STUDY: ICD-10-CM

## 2021-06-03 PROCEDURE — 76705 ECHO EXAM OF ABDOMEN: CPT

## 2021-09-17 ENCOUNTER — APPOINTMENT (OUTPATIENT)
Dept: PHYSICAL THERAPY | Age: 66
End: 2021-09-17

## 2022-03-19 PROBLEM — Z71.89 ADVANCED CARE PLANNING/COUNSELING DISCUSSION: Status: ACTIVE | Noted: 2017-02-20

## 2022-03-20 PROBLEM — E66.01 SEVERE OBESITY (BMI 35.0-39.9) WITH COMORBIDITY (HCC): Status: ACTIVE | Noted: 2018-04-19

## 2023-11-28 ENCOUNTER — HOSPITAL ENCOUNTER (EMERGENCY)
Facility: HOSPITAL | Age: 68
Discharge: HOME | End: 2023-11-28
Payer: MEDICARE

## 2023-11-28 VITALS
OXYGEN SATURATION: 98 % | HEIGHT: 59 IN | RESPIRATION RATE: 18 BRPM | HEART RATE: 78 BPM | BODY MASS INDEX: 32.25 KG/M2 | SYSTOLIC BLOOD PRESSURE: 148 MMHG | WEIGHT: 160 LBS | DIASTOLIC BLOOD PRESSURE: 79 MMHG | TEMPERATURE: 98.5 F

## 2023-11-28 DIAGNOSIS — T54.91XA INGESTION OF BLEACH, ACCIDENTAL OR UNINTENTIONAL, INITIAL ENCOUNTER: Primary | ICD-10-CM

## 2023-11-28 PROCEDURE — 99283 EMERGENCY DEPT VISIT LOW MDM: CPT

## 2023-11-28 ASSESSMENT — ENCOUNTER SYMPTOMS
CHEST TIGHTNESS: 0
VOMITING: 0
COUGH: 0
DIARRHEA: 0
SHORTNESS OF BREATH: 0
NAUSEA: 0
ABDOMINAL PAIN: 0

## 2023-11-28 ASSESSMENT — PAIN - FUNCTIONAL ASSESSMENT: PAIN_FUNCTIONAL_ASSESSMENT: NONE - DENIES PAIN

## 2023-11-28 NOTE — ED PROVIDER NOTES
radiographic images are visualized and preliminarily interpreted by me with findings noted in MDM section. In evenings and at night radiology interpretations are not routinely available. Interpretation per the Radiologist below, if available at the time of this note:    No orders to display         [address]   I am the first provider for this patient. I reviewed the vital signs, available nursing notes, past medical history, past surgical history, family history and social history. Provider Notes (Medical Decision Making):   Bleach ingestion: Small amount of bleach ingested, asymptomatic in the ED, eagerly drinks cold water when provided. Discussed with poison control, outpatient management appropriate. ED Course:   ED Course as of 11/28/23 1436   Tue Nov 28, 2023   1430 Discussed case with poison control, they recommend oral challenge. Outpatient Tylenol and pectin lozenge. Sore throat is to be expected for next couple days. [AS]      ED Course User Index  [AS] Noemí Ga PA-C       1. Ingestion of bleach, accidental or unintentional, initial encounter        No orders of the defined types were placed in this encounter.          Medication List        ASK your doctor about these medications      acetaminophen 500 MG tablet  Commonly known as: TYLENOL     amLODIPine 5 MG tablet  Commonly known as: NORVASC     aspirin 81 MG EC tablet     hydrocortisone 1 % lotion     losartan-hydroCHLOROthiazide 100-25 MG per tablet  Commonly known as: HYZAAR     metoprolol succinate 100 MG extended release tablet  Commonly known as: TOPROL XL     mometasone 50 MCG/ACT nasal spray  Commonly known as: NASONEX     mupirocin 2 % cream  Commonly known as: BACTROBAN     PARoxetine 20 MG tablet  Commonly known as: PAXIL     rosuvastatin 10 MG tablet  Commonly known as: CRESTOR     tolterodine 2 MG tablet  Commonly known as: DETROL     traMADol 50 MG tablet  Commonly known

## 2023-11-28 NOTE — ED TRIAGE NOTES
Pt came from home via EMS, pt said she accidentally ingested bleach 1-2 ounces around 1pm today while taking her medications .  Pt denies any pain/SOB at this time

## 2023-11-28 NOTE — DISCHARGE INSTRUCTIONS
Use lozenges containing \"pectin\" to soothe the throat  Use tylenol as needed for throat pain  Sore throat is to be expected for the next couple days    Return to emergency department if worsening symptoms

## 2023-12-05 ENCOUNTER — APPOINTMENT (OUTPATIENT)
Facility: HOSPITAL | Age: 68
DRG: 481 | End: 2023-12-05
Payer: MEDICARE

## 2023-12-05 ENCOUNTER — HOSPITAL ENCOUNTER (INPATIENT)
Facility: HOSPITAL | Age: 68
LOS: 6 days | Discharge: HOME | DRG: 481 | End: 2023-12-11
Attending: STUDENT IN AN ORGANIZED HEALTH CARE EDUCATION/TRAINING PROGRAM | Admitting: INTERNAL MEDICINE
Payer: MEDICARE

## 2023-12-05 DIAGNOSIS — S72.002A CLOSED DISPLACED FRACTURE OF LEFT FEMORAL NECK (HCC): Primary | ICD-10-CM

## 2023-12-05 DIAGNOSIS — M79.662 PAIN OF LEFT CALF: ICD-10-CM

## 2023-12-05 DIAGNOSIS — Z98.890 POST-OPERATIVE STATE: ICD-10-CM

## 2023-12-05 PROBLEM — Z87.81 S/P RIGHT HIP FRACTURE: Status: ACTIVE | Noted: 2023-12-05

## 2023-12-05 LAB
ALBUMIN SERPL-MCNC: 3.8 G/DL (ref 3.4–5)
ALBUMIN/GLOB SERPL: 1.1 (ref 0.8–1.7)
ALP SERPL-CCNC: 144 U/L (ref 45–117)
ALT SERPL-CCNC: 20 U/L (ref 13–56)
ANION GAP SERPL CALC-SCNC: 4 MMOL/L (ref 3–18)
AST SERPL-CCNC: 12 U/L (ref 10–38)
BASOPHILS # BLD: 0.1 K/UL (ref 0–0.1)
BASOPHILS NFR BLD: 1 % (ref 0–2)
BILIRUB SERPL-MCNC: 0.3 MG/DL (ref 0.2–1)
BUN SERPL-MCNC: 9 MG/DL (ref 7–18)
BUN/CREAT SERPL: 15 (ref 12–20)
CALCIUM SERPL-MCNC: 9.4 MG/DL (ref 8.5–10.1)
CHLORIDE SERPL-SCNC: 104 MMOL/L (ref 100–111)
CO2 SERPL-SCNC: 30 MMOL/L (ref 21–32)
CREAT SERPL-MCNC: 0.59 MG/DL (ref 0.6–1.3)
DIFFERENTIAL METHOD BLD: ABNORMAL
EOSINOPHIL # BLD: 0.2 K/UL (ref 0–0.4)
EOSINOPHIL NFR BLD: 1 % (ref 0–5)
ERYTHROCYTE [DISTWIDTH] IN BLOOD BY AUTOMATED COUNT: 13.5 % (ref 11.6–14.5)
GLOBULIN SER CALC-MCNC: 3.5 G/DL (ref 2–4)
GLUCOSE SERPL-MCNC: 134 MG/DL (ref 74–99)
HCT VFR BLD AUTO: 41.6 % (ref 35–45)
HGB BLD-MCNC: 13.5 G/DL (ref 12–16)
IMM GRANULOCYTES # BLD AUTO: 0.1 K/UL (ref 0–0.04)
IMM GRANULOCYTES NFR BLD AUTO: 1 % (ref 0–0.5)
INR PPP: 1 (ref 0.9–1.1)
LYMPHOCYTES # BLD: 2.1 K/UL (ref 0.9–3.6)
LYMPHOCYTES NFR BLD: 14 % (ref 21–52)
MCH RBC QN AUTO: 28.4 PG (ref 24–34)
MCHC RBC AUTO-ENTMCNC: 32.5 G/DL (ref 31–37)
MCV RBC AUTO: 87.6 FL (ref 78–100)
MONOCYTES # BLD: 0.5 K/UL (ref 0.05–1.2)
MONOCYTES NFR BLD: 3 % (ref 3–10)
NEUTS SEG # BLD: 12.3 K/UL (ref 1.8–8)
NEUTS SEG NFR BLD: 81 % (ref 40–73)
NRBC # BLD: 0 K/UL (ref 0–0.01)
NRBC BLD-RTO: 0 PER 100 WBC
PLATELET # BLD AUTO: 350 K/UL (ref 135–420)
PMV BLD AUTO: 9.5 FL (ref 9.2–11.8)
POTASSIUM SERPL-SCNC: 3.8 MMOL/L (ref 3.5–5.5)
PROT SERPL-MCNC: 7.3 G/DL (ref 6.4–8.2)
PROTHROMBIN TIME: 12.9 SEC (ref 11.9–14.7)
RBC # BLD AUTO: 4.75 M/UL (ref 4.2–5.3)
SODIUM SERPL-SCNC: 138 MMOL/L (ref 136–145)
TROPONIN I SERPL HS-MCNC: 7 NG/L (ref 0–54)
WBC # BLD AUTO: 15.2 K/UL (ref 4.6–13.2)

## 2023-12-05 PROCEDURE — 73700 CT LOWER EXTREMITY W/O DYE: CPT

## 2023-12-05 PROCEDURE — 96374 THER/PROPH/DIAG INJ IV PUSH: CPT

## 2023-12-05 PROCEDURE — 85025 COMPLETE CBC W/AUTO DIFF WBC: CPT

## 2023-12-05 PROCEDURE — 85610 PROTHROMBIN TIME: CPT

## 2023-12-05 PROCEDURE — 6360000002 HC RX W HCPCS: Performed by: EMERGENCY MEDICINE

## 2023-12-05 PROCEDURE — 71045 X-RAY EXAM CHEST 1 VIEW: CPT

## 2023-12-05 PROCEDURE — 99222 1ST HOSP IP/OBS MODERATE 55: CPT | Performed by: INTERNAL MEDICINE

## 2023-12-05 PROCEDURE — 70450 CT HEAD/BRAIN W/O DYE: CPT

## 2023-12-05 PROCEDURE — 99285 EMERGENCY DEPT VISIT HI MDM: CPT

## 2023-12-05 PROCEDURE — 84484 ASSAY OF TROPONIN QUANT: CPT

## 2023-12-05 PROCEDURE — 93005 ELECTROCARDIOGRAM TRACING: CPT | Performed by: EMERGENCY MEDICINE

## 2023-12-05 PROCEDURE — 1100000000 HC RM PRIVATE

## 2023-12-05 PROCEDURE — 73610 X-RAY EXAM OF ANKLE: CPT

## 2023-12-05 PROCEDURE — 72125 CT NECK SPINE W/O DYE: CPT

## 2023-12-05 PROCEDURE — 80053 COMPREHEN METABOLIC PANEL: CPT

## 2023-12-05 PROCEDURE — 6370000000 HC RX 637 (ALT 250 FOR IP)

## 2023-12-05 PROCEDURE — 73502 X-RAY EXAM HIP UNI 2-3 VIEWS: CPT

## 2023-12-05 RX ORDER — MORPHINE SULFATE 4 MG/ML
4 INJECTION, SOLUTION INTRAMUSCULAR; INTRAVENOUS
Status: COMPLETED | OUTPATIENT
Start: 2023-12-05 | End: 2023-12-05

## 2023-12-05 RX ORDER — PAROXETINE HYDROCHLORIDE 20 MG/1
20 TABLET, FILM COATED ORAL DAILY
Status: DISCONTINUED | OUTPATIENT
Start: 2023-12-06 | End: 2023-12-11 | Stop reason: HOSPADM

## 2023-12-05 RX ORDER — METOPROLOL SUCCINATE 50 MG/1
100 TABLET, EXTENDED RELEASE ORAL DAILY
Status: DISCONTINUED | OUTPATIENT
Start: 2023-12-06 | End: 2023-12-11 | Stop reason: HOSPADM

## 2023-12-05 RX ORDER — ALBUTEROL SULFATE 90 UG/1
2 AEROSOL, METERED RESPIRATORY (INHALATION) EVERY 4 HOURS PRN
Status: DISCONTINUED | OUTPATIENT
Start: 2023-12-05 | End: 2023-12-05 | Stop reason: CLARIF

## 2023-12-05 RX ORDER — POTASSIUM CHLORIDE 7.45 MG/ML
10 INJECTION INTRAVENOUS PRN
Status: DISCONTINUED | OUTPATIENT
Start: 2023-12-05 | End: 2023-12-11 | Stop reason: HOSPADM

## 2023-12-05 RX ORDER — MAGNESIUM SULFATE IN WATER 40 MG/ML
2000 INJECTION, SOLUTION INTRAVENOUS PRN
Status: DISCONTINUED | OUTPATIENT
Start: 2023-12-05 | End: 2023-12-11 | Stop reason: HOSPADM

## 2023-12-05 RX ORDER — INSULIN LISPRO 100 [IU]/ML
0-4 INJECTION, SOLUTION INTRAVENOUS; SUBCUTANEOUS NIGHTLY
Status: DISCONTINUED | OUTPATIENT
Start: 2023-12-06 | End: 2023-12-11 | Stop reason: HOSPADM

## 2023-12-05 RX ORDER — ONDANSETRON 4 MG/1
4 TABLET, ORALLY DISINTEGRATING ORAL EVERY 8 HOURS PRN
Status: DISCONTINUED | OUTPATIENT
Start: 2023-12-05 | End: 2023-12-11 | Stop reason: HOSPADM

## 2023-12-05 RX ORDER — SODIUM CHLORIDE 9 MG/ML
INJECTION, SOLUTION INTRAVENOUS PRN
Status: DISCONTINUED | OUTPATIENT
Start: 2023-12-05 | End: 2023-12-11 | Stop reason: HOSPADM

## 2023-12-05 RX ORDER — INSULIN LISPRO 100 [IU]/ML
0-4 INJECTION, SOLUTION INTRAVENOUS; SUBCUTANEOUS
Status: DISCONTINUED | OUTPATIENT
Start: 2023-12-06 | End: 2023-12-11 | Stop reason: HOSPADM

## 2023-12-05 RX ORDER — ROSUVASTATIN CALCIUM 10 MG/1
10 TABLET, COATED ORAL NIGHTLY
Status: DISCONTINUED | OUTPATIENT
Start: 2023-12-06 | End: 2023-12-11 | Stop reason: HOSPADM

## 2023-12-05 RX ORDER — SODIUM CHLORIDE 0.9 % (FLUSH) 0.9 %
5-40 SYRINGE (ML) INJECTION EVERY 12 HOURS SCHEDULED
Status: DISCONTINUED | OUTPATIENT
Start: 2023-12-05 | End: 2023-12-11 | Stop reason: HOSPADM

## 2023-12-05 RX ORDER — TRAMADOL HYDROCHLORIDE 50 MG/1
50 TABLET ORAL EVERY 12 HOURS PRN
Status: DISCONTINUED | OUTPATIENT
Start: 2023-12-05 | End: 2023-12-07 | Stop reason: SDUPTHER

## 2023-12-05 RX ORDER — ONDANSETRON 2 MG/ML
4 INJECTION INTRAMUSCULAR; INTRAVENOUS EVERY 6 HOURS PRN
Status: DISCONTINUED | OUTPATIENT
Start: 2023-12-05 | End: 2023-12-11 | Stop reason: HOSPADM

## 2023-12-05 RX ORDER — ALBUTEROL SULFATE 2.5 MG/3ML
2.5 SOLUTION RESPIRATORY (INHALATION) EVERY 4 HOURS PRN
Status: DISCONTINUED | OUTPATIENT
Start: 2023-12-05 | End: 2023-12-11 | Stop reason: HOSPADM

## 2023-12-05 RX ORDER — POLYETHYLENE GLYCOL 3350 17 G/17G
17 POWDER, FOR SOLUTION ORAL DAILY PRN
Status: DISCONTINUED | OUTPATIENT
Start: 2023-12-05 | End: 2023-12-06

## 2023-12-05 RX ORDER — ACETAMINOPHEN 500 MG
500 TABLET ORAL EVERY 6 HOURS PRN
Status: DISCONTINUED | OUTPATIENT
Start: 2023-12-05 | End: 2023-12-11 | Stop reason: HOSPADM

## 2023-12-05 RX ORDER — AMLODIPINE BESYLATE 5 MG/1
5 TABLET ORAL DAILY
Status: DISCONTINUED | OUTPATIENT
Start: 2023-12-06 | End: 2023-12-11 | Stop reason: HOSPADM

## 2023-12-05 RX ORDER — SODIUM CHLORIDE, SODIUM LACTATE, POTASSIUM CHLORIDE, CALCIUM CHLORIDE 600; 310; 30; 20 MG/100ML; MG/100ML; MG/100ML; MG/100ML
INJECTION, SOLUTION INTRAVENOUS CONTINUOUS
Status: DISPENSED | OUTPATIENT
Start: 2023-12-05 | End: 2023-12-07

## 2023-12-05 RX ORDER — SODIUM CHLORIDE 0.9 % (FLUSH) 0.9 %
5-40 SYRINGE (ML) INJECTION PRN
Status: DISCONTINUED | OUTPATIENT
Start: 2023-12-05 | End: 2023-12-11 | Stop reason: HOSPADM

## 2023-12-05 RX ORDER — ACETAMINOPHEN 500 MG
1000 TABLET ORAL
Status: COMPLETED | OUTPATIENT
Start: 2023-12-05 | End: 2023-12-05

## 2023-12-05 RX ADMIN — MORPHINE SULFATE 4 MG: 4 INJECTION, SOLUTION INTRAMUSCULAR; INTRAVENOUS at 20:53

## 2023-12-05 RX ADMIN — MORPHINE SULFATE 4 MG: 4 INJECTION, SOLUTION INTRAMUSCULAR; INTRAVENOUS at 22:03

## 2023-12-05 RX ADMIN — ACETAMINOPHEN 1000 MG: 500 TABLET ORAL at 19:15

## 2023-12-05 ASSESSMENT — PAIN SCALES - GENERAL: PAINLEVEL_OUTOF10: 0

## 2023-12-05 ASSESSMENT — ENCOUNTER SYMPTOMS
VOMITING: 0
EYES NEGATIVE: 1
NAUSEA: 0
ALLERGIC/IMMUNOLOGIC NEGATIVE: 1
RESPIRATORY NEGATIVE: 1

## 2023-12-05 ASSESSMENT — PAIN DESCRIPTION - FREQUENCY: FREQUENCY: CONTINUOUS

## 2023-12-05 ASSESSMENT — PAIN - FUNCTIONAL ASSESSMENT: PAIN_FUNCTIONAL_ASSESSMENT: 0-10

## 2023-12-05 NOTE — ED TRIAGE NOTES
Pt was rolling to kitchen to cook dinner in her wheelchair and hit a lip in the floor proceeding to fall out of chair. Pt states she landed on her pelvis and fell back hitting her head. Now complains of hip, pelvis, and left ankle pain that worsens with movement. Pt is A&Ox4, GCS of 15, and in good spirits. She has proper judgment and her mood is congruent with her expressions. Pt lives alone and is taken care of by her daughter a few days a week.

## 2023-12-06 ENCOUNTER — APPOINTMENT (OUTPATIENT)
Facility: HOSPITAL | Age: 68
DRG: 481 | End: 2023-12-06
Payer: MEDICARE

## 2023-12-06 ENCOUNTER — ANESTHESIA EVENT (OUTPATIENT)
Facility: HOSPITAL | Age: 68
End: 2023-12-06
Payer: MEDICARE

## 2023-12-06 ENCOUNTER — ANESTHESIA (OUTPATIENT)
Facility: HOSPITAL | Age: 68
End: 2023-12-06
Payer: MEDICARE

## 2023-12-06 PROBLEM — E66.9 CLASS 1 OBESITY: Status: ACTIVE | Noted: 2023-12-06

## 2023-12-06 PROBLEM — D72.829 LEUKOCYTOSIS: Status: ACTIVE | Noted: 2023-12-06

## 2023-12-06 LAB
ABO + RH BLD: NORMAL
ALBUMIN SERPL-MCNC: 3.7 G/DL (ref 3.4–5)
ALBUMIN/GLOB SERPL: 0.9 (ref 0.8–1.7)
ALP SERPL-CCNC: 145 U/L (ref 45–117)
ALT SERPL-CCNC: 17 U/L (ref 13–56)
ANION GAP SERPL CALC-SCNC: 9 MMOL/L (ref 3–18)
AST SERPL-CCNC: 11 U/L (ref 10–38)
BASOPHILS # BLD: 0.1 K/UL (ref 0–0.1)
BASOPHILS NFR BLD: 0 % (ref 0–2)
BILIRUB SERPL-MCNC: 0.5 MG/DL (ref 0.2–1)
BLOOD GROUP ANTIBODIES SERPL: NORMAL
BUN SERPL-MCNC: 8 MG/DL (ref 7–18)
BUN/CREAT SERPL: 14 (ref 12–20)
CALCIUM SERPL-MCNC: 9.3 MG/DL (ref 8.5–10.1)
CHLORIDE SERPL-SCNC: 101 MMOL/L (ref 100–111)
CO2 SERPL-SCNC: 26 MMOL/L (ref 21–32)
CREAT SERPL-MCNC: 0.59 MG/DL (ref 0.6–1.3)
DIFFERENTIAL METHOD BLD: ABNORMAL
EKG ATRIAL RATE: 64 BPM
EKG DIAGNOSIS: NORMAL
EKG P AXIS: 42 DEGREES
EKG P-R INTERVAL: 178 MS
EKG Q-T INTERVAL: 430 MS
EKG QRS DURATION: 74 MS
EKG QTC CALCULATION (BAZETT): 443 MS
EKG R AXIS: 2 DEGREES
EKG T AXIS: 21 DEGREES
EKG VENTRICULAR RATE: 64 BPM
EOSINOPHIL # BLD: 0 K/UL (ref 0–0.4)
EOSINOPHIL NFR BLD: 0 % (ref 0–5)
ERYTHROCYTE [DISTWIDTH] IN BLOOD BY AUTOMATED COUNT: 13.3 % (ref 11.6–14.5)
GLOBULIN SER CALC-MCNC: 4.1 G/DL (ref 2–4)
GLUCOSE BLD STRIP.AUTO-MCNC: 157 MG/DL (ref 70–110)
GLUCOSE BLD STRIP.AUTO-MCNC: 178 MG/DL (ref 70–110)
GLUCOSE BLD STRIP.AUTO-MCNC: 193 MG/DL (ref 70–110)
GLUCOSE BLD STRIP.AUTO-MCNC: 198 MG/DL (ref 70–110)
GLUCOSE BLD STRIP.AUTO-MCNC: 208 MG/DL (ref 70–110)
GLUCOSE SERPL-MCNC: 183 MG/DL (ref 74–99)
HCT VFR BLD AUTO: 39.8 % (ref 35–45)
HGB BLD-MCNC: 13.4 G/DL (ref 12–16)
IMM GRANULOCYTES # BLD AUTO: 0.1 K/UL (ref 0–0.04)
IMM GRANULOCYTES NFR BLD AUTO: 1 % (ref 0–0.5)
LYMPHOCYTES # BLD: 1.3 K/UL (ref 0.9–3.6)
LYMPHOCYTES NFR BLD: 8 % (ref 21–52)
MCH RBC QN AUTO: 29 PG (ref 24–34)
MCHC RBC AUTO-ENTMCNC: 33.7 G/DL (ref 31–37)
MCV RBC AUTO: 86.1 FL (ref 78–100)
MONOCYTES # BLD: 0.6 K/UL (ref 0.05–1.2)
MONOCYTES NFR BLD: 4 % (ref 3–10)
NEUTS SEG # BLD: 15 K/UL (ref 1.8–8)
NEUTS SEG NFR BLD: 88 % (ref 40–73)
NRBC # BLD: 0 K/UL (ref 0–0.01)
NRBC BLD-RTO: 0 PER 100 WBC
PLATELET # BLD AUTO: 315 K/UL (ref 135–420)
PMV BLD AUTO: 9.8 FL (ref 9.2–11.8)
POTASSIUM SERPL-SCNC: 3.4 MMOL/L (ref 3.5–5.5)
PROT SERPL-MCNC: 7.8 G/DL (ref 6.4–8.2)
RBC # BLD AUTO: 4.62 M/UL (ref 4.2–5.3)
SODIUM SERPL-SCNC: 136 MMOL/L (ref 136–145)
SPECIMEN EXP DATE BLD: NORMAL
WBC # BLD AUTO: 17.1 K/UL (ref 4.6–13.2)

## 2023-12-06 PROCEDURE — 86900 BLOOD TYPING SEROLOGIC ABO: CPT

## 2023-12-06 PROCEDURE — 7100000001 HC PACU RECOVERY - ADDTL 15 MIN: Performed by: ORTHOPAEDIC SURGERY

## 2023-12-06 PROCEDURE — 85025 COMPLETE CBC W/AUTO DIFF WBC: CPT

## 2023-12-06 PROCEDURE — 6370000000 HC RX 637 (ALT 250 FOR IP): Performed by: INTERNAL MEDICINE

## 2023-12-06 PROCEDURE — 86850 RBC ANTIBODY SCREEN: CPT

## 2023-12-06 PROCEDURE — 3700000000 HC ANESTHESIA ATTENDED CARE: Performed by: ORTHOPAEDIC SURGERY

## 2023-12-06 PROCEDURE — 93010 ELECTROCARDIOGRAM REPORT: CPT | Performed by: INTERNAL MEDICINE

## 2023-12-06 PROCEDURE — 73502 X-RAY EXAM HIP UNI 2-3 VIEWS: CPT

## 2023-12-06 PROCEDURE — 0QS734Z REPOSITION LEFT UPPER FEMUR WITH INTERNAL FIXATION DEVICE, PERCUTANEOUS APPROACH: ICD-10-PCS | Performed by: ORTHOPAEDIC SURGERY

## 2023-12-06 PROCEDURE — 2580000003 HC RX 258: Performed by: NURSE ANESTHETIST, CERTIFIED REGISTERED

## 2023-12-06 PROCEDURE — 27235 TREAT THIGH FRACTURE: CPT | Performed by: ORTHOPAEDIC SURGERY

## 2023-12-06 PROCEDURE — 82962 GLUCOSE BLOOD TEST: CPT

## 2023-12-06 PROCEDURE — 6360000002 HC RX W HCPCS: Performed by: NURSE ANESTHETIST, CERTIFIED REGISTERED

## 2023-12-06 PROCEDURE — 6370000000 HC RX 637 (ALT 250 FOR IP): Performed by: STUDENT IN AN ORGANIZED HEALTH CARE EDUCATION/TRAINING PROGRAM

## 2023-12-06 PROCEDURE — 86901 BLOOD TYPING SEROLOGIC RH(D): CPT

## 2023-12-06 PROCEDURE — 7100000000 HC PACU RECOVERY - FIRST 15 MIN: Performed by: ORTHOPAEDIC SURGERY

## 2023-12-06 PROCEDURE — 3600000012 HC SURGERY LEVEL 2 ADDTL 15MIN: Performed by: ORTHOPAEDIC SURGERY

## 2023-12-06 PROCEDURE — 2580000003 HC RX 258: Performed by: ORTHOPAEDIC SURGERY

## 2023-12-06 PROCEDURE — 6360000002 HC RX W HCPCS: Performed by: INTERNAL MEDICINE

## 2023-12-06 PROCEDURE — 6370000000 HC RX 637 (ALT 250 FOR IP): Performed by: NURSE ANESTHETIST, CERTIFIED REGISTERED

## 2023-12-06 PROCEDURE — C1713 ANCHOR/SCREW BN/BN,TIS/BN: HCPCS | Performed by: ORTHOPAEDIC SURGERY

## 2023-12-06 PROCEDURE — 80053 COMPREHEN METABOLIC PANEL: CPT

## 2023-12-06 PROCEDURE — 3700000001 HC ADD 15 MINUTES (ANESTHESIA): Performed by: ORTHOPAEDIC SURGERY

## 2023-12-06 PROCEDURE — 1100000000 HC RM PRIVATE

## 2023-12-06 PROCEDURE — 99222 1ST HOSP IP/OBS MODERATE 55: CPT | Performed by: ORTHOPAEDIC SURGERY

## 2023-12-06 PROCEDURE — A4217 STERILE WATER/SALINE, 500 ML: HCPCS | Performed by: ORTHOPAEDIC SURGERY

## 2023-12-06 PROCEDURE — 6360000002 HC RX W HCPCS: Performed by: ORTHOPAEDIC SURGERY

## 2023-12-06 PROCEDURE — 36415 COLL VENOUS BLD VENIPUNCTURE: CPT

## 2023-12-06 PROCEDURE — 2709999900 HC NON-CHARGEABLE SUPPLY: Performed by: ORTHOPAEDIC SURGERY

## 2023-12-06 PROCEDURE — C1769 GUIDE WIRE: HCPCS | Performed by: ORTHOPAEDIC SURGERY

## 2023-12-06 PROCEDURE — 99233 SBSQ HOSP IP/OBS HIGH 50: CPT | Performed by: FAMILY MEDICINE

## 2023-12-06 PROCEDURE — 3600000002 HC SURGERY LEVEL 2 BASE: Performed by: ORTHOPAEDIC SURGERY

## 2023-12-06 PROCEDURE — 2500000003 HC RX 250 WO HCPCS: Performed by: NURSE ANESTHETIST, CERTIFIED REGISTERED

## 2023-12-06 PROCEDURE — 2580000003 HC RX 258: Performed by: INTERNAL MEDICINE

## 2023-12-06 DEVICE — IMPLANTABLE DEVICE: Type: IMPLANTABLE DEVICE | Site: HIP | Status: FUNCTIONAL

## 2023-12-06 RX ORDER — SUCCINYLCHOLINE/SOD CL,ISO/PF 100 MG/5ML
SYRINGE (ML) INTRAVENOUS PRN
Status: DISCONTINUED | OUTPATIENT
Start: 2023-12-06 | End: 2023-12-06 | Stop reason: SDUPTHER

## 2023-12-06 RX ORDER — DEXTROSE MONOHYDRATE 100 MG/ML
INJECTION, SOLUTION INTRAVENOUS CONTINUOUS PRN
Status: DISCONTINUED | OUTPATIENT
Start: 2023-12-06 | End: 2023-12-06 | Stop reason: HOSPADM

## 2023-12-06 RX ORDER — POLYETHYLENE GLYCOL 3350 17 G/17G
17 POWDER, FOR SOLUTION ORAL DAILY PRN
Status: DISCONTINUED | OUTPATIENT
Start: 2023-12-06 | End: 2023-12-11 | Stop reason: HOSPADM

## 2023-12-06 RX ORDER — PROPOFOL 10 MG/ML
INJECTION, EMULSION INTRAVENOUS PRN
Status: DISCONTINUED | OUTPATIENT
Start: 2023-12-06 | End: 2023-12-06 | Stop reason: SDUPTHER

## 2023-12-06 RX ORDER — INSULIN LISPRO 100 [IU]/ML
0-15 INJECTION, SOLUTION INTRAVENOUS; SUBCUTANEOUS ONCE
Status: COMPLETED | OUTPATIENT
Start: 2023-12-06 | End: 2023-12-06

## 2023-12-06 RX ORDER — FENTANYL CITRATE 50 UG/ML
25 INJECTION, SOLUTION INTRAMUSCULAR; INTRAVENOUS EVERY 5 MIN PRN
Status: DISCONTINUED | OUTPATIENT
Start: 2023-12-06 | End: 2023-12-06 | Stop reason: HOSPADM

## 2023-12-06 RX ORDER — ROCURONIUM BROMIDE 10 MG/ML
INJECTION, SOLUTION INTRAVENOUS PRN
Status: DISCONTINUED | OUTPATIENT
Start: 2023-12-06 | End: 2023-12-06 | Stop reason: SDUPTHER

## 2023-12-06 RX ORDER — ONDANSETRON 2 MG/ML
INJECTION INTRAMUSCULAR; INTRAVENOUS PRN
Status: DISCONTINUED | OUTPATIENT
Start: 2023-12-06 | End: 2023-12-06 | Stop reason: SDUPTHER

## 2023-12-06 RX ORDER — LIDOCAINE HYDROCHLORIDE 10 MG/ML
1 INJECTION, SOLUTION EPIDURAL; INFILTRATION; INTRACAUDAL; PERINEURAL
Status: DISCONTINUED | OUTPATIENT
Start: 2023-12-06 | End: 2023-12-06 | Stop reason: HOSPADM

## 2023-12-06 RX ORDER — LIDOCAINE HYDROCHLORIDE 20 MG/ML
INJECTION, SOLUTION EPIDURAL; INFILTRATION; INTRACAUDAL; PERINEURAL PRN
Status: DISCONTINUED | OUTPATIENT
Start: 2023-12-06 | End: 2023-12-06 | Stop reason: SDUPTHER

## 2023-12-06 RX ORDER — FENTANYL CITRATE 50 UG/ML
INJECTION, SOLUTION INTRAMUSCULAR; INTRAVENOUS PRN
Status: DISCONTINUED | OUTPATIENT
Start: 2023-12-06 | End: 2023-12-06 | Stop reason: SDUPTHER

## 2023-12-06 RX ORDER — MORPHINE SULFATE 4 MG/ML
4 INJECTION, SOLUTION INTRAMUSCULAR; INTRAVENOUS EVERY 4 HOURS PRN
Status: DISCONTINUED | OUTPATIENT
Start: 2023-12-06 | End: 2023-12-07

## 2023-12-06 RX ORDER — SODIUM CHLORIDE, SODIUM LACTATE, POTASSIUM CHLORIDE, CALCIUM CHLORIDE 600; 310; 30; 20 MG/100ML; MG/100ML; MG/100ML; MG/100ML
INJECTION, SOLUTION INTRAVENOUS CONTINUOUS
Status: DISCONTINUED | OUTPATIENT
Start: 2023-12-06 | End: 2023-12-06 | Stop reason: HOSPADM

## 2023-12-06 RX ORDER — FAMOTIDINE 20 MG/1
20 TABLET, FILM COATED ORAL ONCE
Status: COMPLETED | OUTPATIENT
Start: 2023-12-06 | End: 2023-12-06

## 2023-12-06 RX ORDER — PROCHLORPERAZINE EDISYLATE 5 MG/ML
5 INJECTION INTRAMUSCULAR; INTRAVENOUS
Status: DISCONTINUED | OUTPATIENT
Start: 2023-12-06 | End: 2023-12-06 | Stop reason: HOSPADM

## 2023-12-06 RX ORDER — CLINDAMYCIN PHOSPHATE 600 MG/50ML
600 INJECTION, SOLUTION INTRAVENOUS
Status: COMPLETED | OUTPATIENT
Start: 2023-12-06 | End: 2023-12-06

## 2023-12-06 RX ORDER — CLINDAMYCIN PHOSPHATE 600 MG/50ML
600 INJECTION, SOLUTION INTRAVENOUS EVERY 8 HOURS
Status: COMPLETED | OUTPATIENT
Start: 2023-12-07 | End: 2023-12-07

## 2023-12-06 RX ORDER — ENOXAPARIN SODIUM 100 MG/ML
40 INJECTION SUBCUTANEOUS DAILY
Status: DISCONTINUED | OUTPATIENT
Start: 2023-12-07 | End: 2023-12-11 | Stop reason: HOSPADM

## 2023-12-06 RX ORDER — SODIUM CHLORIDE, SODIUM LACTATE, POTASSIUM CHLORIDE, CALCIUM CHLORIDE 600; 310; 30; 20 MG/100ML; MG/100ML; MG/100ML; MG/100ML
INJECTION, SOLUTION INTRAVENOUS CONTINUOUS
Status: DISCONTINUED | OUTPATIENT
Start: 2023-12-06 | End: 2023-12-10

## 2023-12-06 RX ORDER — HYDRALAZINE HYDROCHLORIDE 20 MG/ML
10 INJECTION INTRAMUSCULAR; INTRAVENOUS EVERY 6 HOURS PRN
Status: DISCONTINUED | OUTPATIENT
Start: 2023-12-06 | End: 2023-12-11 | Stop reason: HOSPADM

## 2023-12-06 RX ORDER — MIDAZOLAM HYDROCHLORIDE 1 MG/ML
INJECTION INTRAMUSCULAR; INTRAVENOUS PRN
Status: DISCONTINUED | OUTPATIENT
Start: 2023-12-06 | End: 2023-12-06 | Stop reason: SDUPTHER

## 2023-12-06 RX ADMIN — TRAMADOL HYDROCHLORIDE 50 MG: 50 TABLET ORAL at 00:54

## 2023-12-06 RX ADMIN — PAROXETINE HYDROCHLORIDE 20 MG: 20 TABLET, FILM COATED ORAL at 08:16

## 2023-12-06 RX ADMIN — MIDAZOLAM 1 MG: 1 INJECTION, SOLUTION INTRAMUSCULAR; INTRAVENOUS at 18:15

## 2023-12-06 RX ADMIN — MORPHINE SULFATE 4 MG: 4 INJECTION, SOLUTION INTRAMUSCULAR; INTRAVENOUS at 08:16

## 2023-12-06 RX ADMIN — LIDOCAINE HYDROCHLORIDE 40 MG: 20 INJECTION, SOLUTION EPIDURAL; INFILTRATION; INTRACAUDAL; PERINEURAL at 18:20

## 2023-12-06 RX ADMIN — SODIUM CHLORIDE, POTASSIUM CHLORIDE, SODIUM LACTATE AND CALCIUM CHLORIDE: 600; 310; 30; 20 INJECTION, SOLUTION INTRAVENOUS at 14:25

## 2023-12-06 RX ADMIN — FENTANYL CITRATE 50 MCG: 50 INJECTION INTRAMUSCULAR; INTRAVENOUS at 18:50

## 2023-12-06 RX ADMIN — PROPOFOL 120 MG: 10 INJECTION, EMULSION INTRAVENOUS at 18:20

## 2023-12-06 RX ADMIN — ONDANSETRON 4 MG: 2 INJECTION INTRAMUSCULAR; INTRAVENOUS at 20:13

## 2023-12-06 RX ADMIN — FENTANYL CITRATE 50 MCG: 50 INJECTION INTRAMUSCULAR; INTRAVENOUS at 18:20

## 2023-12-06 RX ADMIN — SODIUM CHLORIDE, PRESERVATIVE FREE 10 ML: 5 INJECTION INTRAVENOUS at 08:17

## 2023-12-06 RX ADMIN — SODIUM CHLORIDE, POTASSIUM CHLORIDE, SODIUM LACTATE AND CALCIUM CHLORIDE: 600; 310; 30; 20 INJECTION, SOLUTION INTRAVENOUS at 00:50

## 2023-12-06 RX ADMIN — INSULIN LISPRO 6 UNITS: 100 INJECTION, SOLUTION INTRAVENOUS; SUBCUTANEOUS at 16:57

## 2023-12-06 RX ADMIN — MORPHINE SULFATE 4 MG: 4 INJECTION, SOLUTION INTRAMUSCULAR; INTRAVENOUS at 01:57

## 2023-12-06 RX ADMIN — SODIUM CHLORIDE, POTASSIUM CHLORIDE, SODIUM LACTATE AND CALCIUM CHLORIDE: 600; 310; 30; 20 INJECTION, SOLUTION INTRAVENOUS at 16:28

## 2023-12-06 RX ADMIN — CLINDAMYCIN PHOSPHATE 600 MG: 600 INJECTION, SOLUTION INTRAVENOUS at 18:31

## 2023-12-06 RX ADMIN — METOPROLOL SUCCINATE 100 MG: 50 TABLET, EXTENDED RELEASE ORAL at 08:16

## 2023-12-06 RX ADMIN — MIDAZOLAM 1 MG: 1 INJECTION, SOLUTION INTRAMUSCULAR; INTRAVENOUS at 18:08

## 2023-12-06 RX ADMIN — ROCURONIUM BROMIDE 5 MG: 10 INJECTION, SOLUTION INTRAVENOUS at 18:20

## 2023-12-06 RX ADMIN — MORPHINE SULFATE 4 MG: 4 INJECTION, SOLUTION INTRAMUSCULAR; INTRAVENOUS at 14:14

## 2023-12-06 RX ADMIN — SODIUM CHLORIDE, PRESERVATIVE FREE 10 ML: 5 INJECTION INTRAVENOUS at 00:51

## 2023-12-06 RX ADMIN — Medication 60 MG: at 18:21

## 2023-12-06 RX ADMIN — AMLODIPINE BESYLATE 5 MG: 5 TABLET ORAL at 08:16

## 2023-12-06 RX ADMIN — FAMOTIDINE 20 MG: 20 TABLET ORAL at 16:28

## 2023-12-06 ASSESSMENT — PAIN DESCRIPTION - ORIENTATION
ORIENTATION: LEFT
ORIENTATION: RIGHT
ORIENTATION: RIGHT
ORIENTATION: LEFT
ORIENTATION: LEFT

## 2023-12-06 ASSESSMENT — PAIN SCALES - GENERAL
PAINLEVEL_OUTOF10: 0
PAINLEVEL_OUTOF10: 9
PAINLEVEL_OUTOF10: 5
PAINLEVEL_OUTOF10: 10
PAINLEVEL_OUTOF10: 0
PAINLEVEL_OUTOF10: 9
PAINLEVEL_OUTOF10: 0
PAINLEVEL_OUTOF10: 0
PAINLEVEL_OUTOF10: 5
PAINLEVEL_OUTOF10: 0
PAINLEVEL_OUTOF10: 9

## 2023-12-06 ASSESSMENT — PAIN DESCRIPTION - DESCRIPTORS
DESCRIPTORS: SHARP
DESCRIPTORS: ACHING

## 2023-12-06 ASSESSMENT — PAIN DESCRIPTION - LOCATION
LOCATION: HIP

## 2023-12-06 ASSESSMENT — PAIN - FUNCTIONAL ASSESSMENT: PAIN_FUNCTIONAL_ASSESSMENT: 0-10

## 2023-12-06 NOTE — H&P
History & Physical    Patient: Alex Coleman MRN: [id number]  [facility]: [id number]    YOB: 1955  Age: 76 y.o. Sex: female      DOA: 12/5/2023    Chief Complaint:   Chief Complaint   Patient presents with    Fall    Hip Pain     Ayana Bland out of wheelchair d/t to lip in the floor. Has hip pain and left ankle pain. Previous left sided deficits from former cva. HPI:     Alex Coleman is a 76 y.o.  female with a pmh of type 2 DM, osteoarthritis, HTN, CVA with left-sided deficits, depression who presents for fall. Patient notes that she was at home by herself as she lives alone. She was in a wheelchair as her electric wheelchair is in the shop and she was trying to go to a different room when she ran into a small bump on the floor transitioning from one room to the other and she fell forward striking her left hip, left arm and and head. The patient is not on anticoagulation at this time. She denies loss of consciousness or vomiting. There was a report of left ankle and left hip pain. Patient has known left-sided deficits from a former stroke as well. The patient denies smoking, alcohol use, drugs. The patient lives alone in senior living apartments. She has a daughter who goes over her medicines with her. Interestingly had a recent ED visit for ingesting a small amount of bleach however she was sent home at that time. In the emergency department it was noted the patient had a normal temp, a normal respiratory rate, a normal heart rate and a blood pressure of 149/79 and a oxygen saturation of 97%. Labs were notable for a glucose of 134, a creatinine of 0.59, and alk phos of 144\, a white blood cell count of 15.2. A left hip x-ray showed a minimally displaced left subcapital femoral neck fracture. CT head without contrast was negative. CT cervical spine showed no acute fracture or traumatic malalignment of the cervical spine. Chest x-ray showed no acute airspace disease.

## 2023-12-06 NOTE — H&P
Update History & Physical    The patient's Consult of December 6, 2023 was reviewed with the patient and I examined the patient. There was no change. The surgical site was confirmed by the patient and me. Plan: The risks, benefits, expected outcome, and alternative to the recommended procedure have been discussed with the patient. Patient understands and wants to proceed with the procedure.      Electronically signed by Ivania Chirinos DO on 12/6/2023 at 5:25 PM

## 2023-12-06 NOTE — ED NOTES
Pt repositioned in bed for comfort. Pt medicated. Pt provided frozen dinner tray.       Eden Velasco [place]  12/05/23 [id number]

## 2023-12-07 LAB
BASOPHILS # BLD: 0.1 K/UL (ref 0–0.1)
BASOPHILS NFR BLD: 0 % (ref 0–2)
DIFFERENTIAL METHOD BLD: ABNORMAL
EOSINOPHIL # BLD: 0.1 K/UL (ref 0–0.4)
EOSINOPHIL NFR BLD: 1 % (ref 0–5)
ERYTHROCYTE [DISTWIDTH] IN BLOOD BY AUTOMATED COUNT: 13.2 % (ref 11.6–14.5)
GLUCOSE BLD STRIP.AUTO-MCNC: 192 MG/DL (ref 70–110)
GLUCOSE BLD STRIP.AUTO-MCNC: 199 MG/DL (ref 70–110)
GLUCOSE BLD STRIP.AUTO-MCNC: 203 MG/DL (ref 70–110)
GLUCOSE BLD STRIP.AUTO-MCNC: 213 MG/DL (ref 70–110)
GLUCOSE BLD STRIP.AUTO-MCNC: 275 MG/DL (ref 70–110)
HCT VFR BLD AUTO: 38.4 % (ref 35–45)
HGB BLD-MCNC: 12.9 G/DL (ref 12–16)
IMM GRANULOCYTES # BLD AUTO: 0.1 K/UL (ref 0–0.04)
IMM GRANULOCYTES NFR BLD AUTO: 1 % (ref 0–0.5)
LYMPHOCYTES # BLD: 1.3 K/UL (ref 0.9–3.6)
LYMPHOCYTES NFR BLD: 10 % (ref 21–52)
MCH RBC QN AUTO: 28.8 PG (ref 24–34)
MCHC RBC AUTO-ENTMCNC: 33.6 G/DL (ref 31–37)
MCV RBC AUTO: 85.7 FL (ref 78–100)
MONOCYTES # BLD: 0.7 K/UL (ref 0.05–1.2)
MONOCYTES NFR BLD: 5 % (ref 3–10)
NEUTS SEG # BLD: 11.3 K/UL (ref 1.8–8)
NEUTS SEG NFR BLD: 84 % (ref 40–73)
NRBC # BLD: 0 K/UL (ref 0–0.01)
NRBC BLD-RTO: 0 PER 100 WBC
PLATELET # BLD AUTO: 294 K/UL (ref 135–420)
PMV BLD AUTO: 9.6 FL (ref 9.2–11.8)
RBC # BLD AUTO: 4.48 M/UL (ref 4.2–5.3)
WBC # BLD AUTO: 13.5 K/UL (ref 4.6–13.2)

## 2023-12-07 PROCEDURE — 36415 COLL VENOUS BLD VENIPUNCTURE: CPT

## 2023-12-07 PROCEDURE — 6370000000 HC RX 637 (ALT 250 FOR IP): Performed by: FAMILY MEDICINE

## 2023-12-07 PROCEDURE — 6370000000 HC RX 637 (ALT 250 FOR IP): Performed by: INTERNAL MEDICINE

## 2023-12-07 PROCEDURE — 97165 OT EVAL LOW COMPLEX 30 MIN: CPT

## 2023-12-07 PROCEDURE — 6360000002 HC RX W HCPCS: Performed by: ORTHOPAEDIC SURGERY

## 2023-12-07 PROCEDURE — 82962 GLUCOSE BLOOD TEST: CPT

## 2023-12-07 PROCEDURE — 97162 PT EVAL MOD COMPLEX 30 MIN: CPT

## 2023-12-07 PROCEDURE — 99232 SBSQ HOSP IP/OBS MODERATE 35: CPT | Performed by: FAMILY MEDICINE

## 2023-12-07 PROCEDURE — 1100000000 HC RM PRIVATE

## 2023-12-07 PROCEDURE — 97530 THERAPEUTIC ACTIVITIES: CPT

## 2023-12-07 PROCEDURE — 94761 N-INVAS EAR/PLS OXIMETRY MLT: CPT

## 2023-12-07 PROCEDURE — 85025 COMPLETE CBC W/AUTO DIFF WBC: CPT

## 2023-12-07 PROCEDURE — 97535 SELF CARE MNGMENT TRAINING: CPT

## 2023-12-07 PROCEDURE — 6360000002 HC RX W HCPCS: Performed by: INTERNAL MEDICINE

## 2023-12-07 PROCEDURE — 2580000003 HC RX 258: Performed by: INTERNAL MEDICINE

## 2023-12-07 RX ORDER — OXYCODONE HYDROCHLORIDE 5 MG/1
5 TABLET ORAL EVERY 6 HOURS PRN
Status: DISCONTINUED | OUTPATIENT
Start: 2023-12-07 | End: 2023-12-11 | Stop reason: HOSPADM

## 2023-12-07 RX ADMIN — ROSUVASTATIN CALCIUM 10 MG: 10 TABLET, COATED ORAL at 20:43

## 2023-12-07 RX ADMIN — MORPHINE SULFATE 4 MG: 4 INJECTION, SOLUTION INTRAMUSCULAR; INTRAVENOUS at 08:34

## 2023-12-07 RX ADMIN — SODIUM CHLORIDE, PRESERVATIVE FREE 10 ML: 5 INJECTION INTRAVENOUS at 08:35

## 2023-12-07 RX ADMIN — CLINDAMYCIN PHOSPHATE 600 MG: 600 INJECTION, SOLUTION INTRAVENOUS at 10:32

## 2023-12-07 RX ADMIN — SODIUM CHLORIDE, PRESERVATIVE FREE 10 ML: 5 INJECTION INTRAVENOUS at 20:45

## 2023-12-07 RX ADMIN — AMLODIPINE BESYLATE 5 MG: 5 TABLET ORAL at 08:35

## 2023-12-07 RX ADMIN — DOCUSATE SODIUM LIQUID 50 MG: 100 LIQUID ORAL at 20:44

## 2023-12-07 RX ADMIN — CLINDAMYCIN PHOSPHATE 600 MG: 600 INJECTION, SOLUTION INTRAVENOUS at 02:16

## 2023-12-07 RX ADMIN — OXYCODONE HYDROCHLORIDE 5 MG: 5 TABLET ORAL at 20:44

## 2023-12-07 RX ADMIN — PAROXETINE HYDROCHLORIDE 20 MG: 20 TABLET, FILM COATED ORAL at 08:34

## 2023-12-07 RX ADMIN — INSULIN LISPRO 2 UNITS: 100 INJECTION, SOLUTION INTRAVENOUS; SUBCUTANEOUS at 12:31

## 2023-12-07 RX ADMIN — OXYCODONE HYDROCHLORIDE 5 MG: 5 TABLET ORAL at 11:00

## 2023-12-07 RX ADMIN — ENOXAPARIN SODIUM 40 MG: 100 INJECTION SUBCUTANEOUS at 08:35

## 2023-12-07 RX ADMIN — DOCUSATE SODIUM LIQUID 50 MG: 100 LIQUID ORAL at 08:36

## 2023-12-07 RX ADMIN — METOPROLOL SUCCINATE 100 MG: 50 TABLET, EXTENDED RELEASE ORAL at 08:35

## 2023-12-07 RX ADMIN — ROSUVASTATIN CALCIUM 10 MG: 10 TABLET, COATED ORAL at 00:31

## 2023-12-07 ASSESSMENT — PAIN SCALES - GENERAL
PAINLEVEL_OUTOF10: 9
PAINLEVEL_OUTOF10: 3
PAINLEVEL_OUTOF10: 4
PAINLEVEL_OUTOF10: 8
PAINLEVEL_OUTOF10: 0
PAINLEVEL_OUTOF10: 0
PAINLEVEL_OUTOF10: 6
PAINLEVEL_OUTOF10: 3
PAINLEVEL_OUTOF10: 0

## 2023-12-07 ASSESSMENT — PAIN DESCRIPTION - LOCATION
LOCATION: LEG
LOCATION: LEG;BACK;HIP
LOCATION: BACK;LEG;HIP
LOCATION: HIP

## 2023-12-07 ASSESSMENT — PAIN DESCRIPTION - ORIENTATION
ORIENTATION: LEFT

## 2023-12-07 ASSESSMENT — PAIN DESCRIPTION - DESCRIPTORS
DESCRIPTORS: ACHING
DESCRIPTORS: ACHING;TENDER;DISCOMFORT

## 2023-12-07 ASSESSMENT — PAIN DESCRIPTION - FREQUENCY: FREQUENCY: INTERMITTENT

## 2023-12-07 NOTE — ANESTHESIA POSTPROCEDURE EVALUATION
Department of Anesthesiology  Postprocedure Note    Patient: Sonia Wallace  MRN: [id number]  YOB: 1955  Date of evaluation: 12/6/2023      Procedure Summary       Date: 12/06/23 Room / Location: [facility] MAIN 07 / [facility] MAIN OR    Anesthesia Start: 2010 Anesthesia Stop: 2026    Procedure: LEFT HIP CLOSED REDUCTION WITH PERCUTANEOUS SCREWS (Left: Hip) Diagnosis:       Closed fracture of left hip, initial encounter ([address])      (Closed fracture of left hip, initial encounter ([address]) Jadyn Jackson)    Surgeons:  Olga Kenny DO Responsible Provider: Micki Grant MD    Anesthesia Type: General ASA Status: 3            Anesthesia Type: General    Saroj Phase I: Saroj Score: 6    Saroj Phase II:        Anesthesia Post Evaluation    Patient location during evaluation: PACU  Patient participation: complete - patient participated  Level of consciousness: sleepy but conscious  Pain score: 0  Airway patency: patent  Nausea & Vomiting: no nausea and no vomiting  Complications: no  Cardiovascular status: blood pressure returned to baseline  Respiratory status: acceptable  Hydration status: euvolemic  Pain management: adequate

## 2023-12-07 NOTE — BRIEF OP NOTE
Brief Postoperative Note      Patient: Kasi Barron  YOB: 1955  MRN: [id number]    Date of Procedure: 12/6/2023    Pre-Op Diagnosis Codes:     * Closed fracture of left hip, initial encounter ([address] ) [S72.002A]    Post-Op Diagnosis: Same       Procedure(s):  LEFT HIP CLOSED REDUCTION WITH PERCUTANEOUS SCREWS    Surgeon(s):   Fuentes Russell DO    Assistant:  Surgical Assistant: Juan Davenport    Anesthesia: General    Estimated Blood Loss (mL): less than 50     Complications: None    Specimens:   * No specimens in log *    Implants:  * No implants in log *      Drains:   External Urinary Catheter (Active)   Site Assessment Clean,dry & intact 12/06/23 0716   Placement Repositioned 12/06/23 0716   Securement Method Other (Comment) 12/06/23 0716   Catheter Care Catheter/Wick replaced 12/06/23 0716   Perineal Care Yes 12/06/23 0716   Suction 40 mmgHg continuous 12/06/23 0716   Urine Color Yellow 12/06/23 0716   Urine Appearance Clear 12/06/23 0716   Urine Odor Malodorous 12/06/23 0716   Output (mL) 50 mL 12/06/23 0716       Findings: stable femoral neck fracture      Electronically signed by Fuentes Russell DO on 12/6/2023 at 8:22 PM

## 2023-12-07 NOTE — OP NOTE
Operative Note      Patient: Theo Perea  YOB: 1955  MRN: [id number]    Date of Procedure: 12/6/2023    Pre-Op Diagnosis Codes:     * Closed fracture of left hip, initial encounter ([address]) [S72.002A]    Post-Op Diagnosis: Same       Procedure(s):  LEFT HIP CLOSED REDUCTION WITH PERCUTANEOUS SCREWS    Surgeon(s): Shanelle Hale DO    Assistant:   Surgical Assistant: Inga Wayne    Anesthesia: General    Estimated Blood Loss (mL): less than 50     Complications: None    Specimens:   * No specimens in log *    Implants:  * No implants in log *      Drains:   External Urinary Catheter (Active)   Site Assessment Clean,dry & intact 12/06/23 0716   Placement Repositioned 12/06/23 0716   Securement Method Other (Comment) 12/06/23 0716   Catheter Care Catheter/Wick replaced 12/06/23 0716   Perineal Care Yes 12/06/23 0716   Suction 40 mmgHg continuous 12/06/23 0716   Urine Color Yellow 12/06/23 0716   Urine Appearance Clear 12/06/23 0716   Urine Odor Malodorous 12/06/23 0716   Output (mL) 50 mL 12/06/23 0716       Findings: Minimally displaced fracture of the femoral neck. Detailed Description of Procedure: Indications for procedure been outlined in the perioperative documentation, most notably being not amenable to conservative treatment. Informed consent was obtained from the patient. The risks and benefits of the procedure were discussed with the patient. They include but are not limited to neurovascular injury, tendon/ligamentous injury, blood loss, infection, need for further surgery, hematoma, neuroma, seroma, chronic pain, chronic stiffness, complications from anesthesia including death, and the possibility of ezio Covid. Additionally I discussed with patient and the patient's daughter that given her history of paralysis on the left side, hip hemiarthroplasty risk would outweigh the benefits. Daughter and patient agreed with this.   We discussed nonoperative treatment versus

## 2023-12-07 NOTE — FLOWSHEET NOTE
12/06/23 1908   Handoff   Communication Given Shift Handoff   Handoff Given To LUNA AHUJA   Handoff Received From LUNA PANCHAL   Handoff Communication Face to Face   Time Handoff Given 1909   End of Shift Check Performed Yes     REPORTED TO ONCOMING NURSE.  PT IS STILL IN THE OR

## 2023-12-07 NOTE — CONSULTS
[facility]  (A Division of [facility])      Consultation Note      Date of Admission: 12/5/2023    Date of Note: 12/6/2023      Reason for Referral: Leukocytosis  Referring Physician: Dr. Berry Rodriguez from this admission:   None    Current Antimicrobials:    Prior Antimicrobials:  Clindamycin 12/6 to present        Assessment:         Leukocytosis: Most likely reactive. Left hip fracture following mechanical fall  Hypertension  Diabetes mellitus type 2  Plan:   Pending UA. No additional antibiotics outside of perioperative antibiotics required  Repeat CBC, BMP in a.m. Jerry Walter DO  [facility]  [address], [address] [place] [address]  Office: [phone], Ext [phone]      Lines / Catheters:  Peripheral    HPI:  Ms. Teresa Colon is a 59-year-old female with a past medical history of hypertension, prior CVA with left-sided deficits, diabetes mellitus type 2 who presented to the emergency department on 12/5 after falling out of her wheelchair. She subsequently developed left hip and ankle pain. X-ray of the left hip showed minimally displaced left subcapital femoral neck fracture. CT of the head with contrast without acute findings. Chest x-ray no acute airspace disease. WBCs on presentation were 15.2 hemodynamics are stable. No fevers, no tachycardia, no tachypnea, no hypoxia. No other particular complaints such as nausea vomiting diarrhea cough shortness of breath or sore throat.          Past Medical History:   Diagnosis Date    Arthritis     CVA (cerebral vascular accident) ([address]) 11/2010    Left side paralysis    Diabetes ([address])     Dyslipidemia 11/27/2012    Hypertension     Osteoarthritis 11/1/2012    Overactive bladder 12/13/2012    Psychiatric disorder     Anxiety    Type 2 diabetes mellitus ([address]) 9/12/2012     Past Surgical History:   Procedure Laterality Date    DILATION AND CURETTAGE OF UTERUS       Family
12/05/2023    Narrative  EXAM: CT HIP LEFT WITHOUT CONTRAST    INDICATION: Trauma. COMPARISON: 12/5/2023 radiographs. TECHNIQUE: MDCT of the left hip. Sagittal and coronal reformations created from  original data set. All CT scans at this facility are performed using dose  optimization techniques as appropriate to a performed exam, to include automated  exposure control, adjustment of the mA and/or kV according to patient's size  (including appropriate matching for site specific examinations), or use of  iterative reconstruction technique. FINDINGS:    Redemonstrated subcapital fracture of the left femoral neck with mild  comminution. There is moderate anterior displacement. There is approximately 1.5  cm of override. Colonic diverticulosis. Aortic calcifications. Degenerative changes of the  lumbar spine and sacroiliac joints. Impression  Moderately displaced and overriding comminuted subcapital fracture of the left  femoral neck. EXAM: XR XR HIP 2-3 VW W PELVIS LEFT 12/5/2023  FINDINGS:     Minimally displaced left subcapital femoral neck fracture no additional  fractures noted. Alignment is maintained otherwise. The sacroiliac joints and  pubic symphysis are approximated. Mild degenerative changes of bilateral  femoroacetabular joints. IMPRESSION:     Minimally displaced left subcapital femoral neck fracture. Xray Result (most recent):  XR ANKLE LEFT (MIN 3 VIEWS) 12/05/2023    Narrative  EXAM: Left Ankle X-ray    CLINICAL INDICATION:  fall pain    TECHNIQUE: 3 views of the left ankle    COMPARISON: None    FINDINGS:  Generalized osteopenia is noted. The alignment is unremarkable. No evidence of  acute fracture or dislocation appreciated. The joint spaces are preserved. No  evidence of erosions or periostitis. No lytic or blastic focus present. The soft  tissues are unremarkable. Impression  No acute pathology in the left ankle. Osteopenia is present.           Labs:  Lab Results

## 2023-12-07 NOTE — CARE COORDINATION
12/07/23 [facility]   Service Assessment   Patient Orientation Alert and Oriented   Cognition Alert   History Provided By Patient; Child/Family   Primary Caregiver Self   Support Systems Children;Family Members   Patient's Healthcare Decision Maker is: Legal Next of [facility]   PCP Verified by CM Yes   Last Visit to PCP Within last 3 months   Prior Functional Level Assistance with the following:;Bathing;Dressing; Toileting;Cooking;Housework; Shopping;Mobility   Current Functional Level Assistance with the following:;Bathing;Dressing; Toileting;Cooking;Housework; Shopping;Mobility   Can patient return to prior living arrangement Yes   Ability to make needs known: Good   Family able to assist with home care needs: Yes   Would you like for me to discuss the discharge plan with any other family members/significant others, and if so, who? Yes  (Daughter Riky Washington)   Social/Functional History   Lives With Alone   Type of [address] One level   Bathroom Shower/Tub Tub/Shower unit   Bathroom Toilet Standard   Bathroom Equipment 3-in-1 commode;Commode; Shower chair   Bathroom Accessibility Accessible;Walker accessible   licha Flores; Wheelchair-manual;Wheelchair-electric   Receives Help From Family   ADL Assistance Needs assistance   Toileting Needs assistance   Homemaking Assistance Needs assistance   Ambulation Assistance Needs assistance   Transfer Assistance Independent   Occupation Retired; On disability   Discharge Planning   Potential Assistance Purchasing Medications No   Patient expects to be discharged to: [facility]   Transition of Care Consult (CM Consult) Discharge Planning   Mode of Transport at Discharge Other (see comment)  (Pending dispo)   Confirm Follow Up Transport Family   Condition of Participation: Discharge Planning   The Plan for Transition of Care is related to the following treatment goals: Pending ARU acceptance   The Patient and/or Patient

## 2023-12-08 ENCOUNTER — APPOINTMENT (OUTPATIENT)
Facility: HOSPITAL | Age: 68
DRG: 481 | End: 2023-12-08
Payer: MEDICARE

## 2023-12-08 PROBLEM — Z98.890 POST-OPERATIVE STATE: Status: ACTIVE | Noted: 2023-12-08

## 2023-12-08 PROBLEM — M79.662 PAIN OF LEFT CALF: Status: ACTIVE | Noted: 2023-12-08

## 2023-12-08 LAB
ECHO BSA: 1.75 M2
GLUCOSE BLD STRIP.AUTO-MCNC: 157 MG/DL (ref 70–110)
GLUCOSE BLD STRIP.AUTO-MCNC: 208 MG/DL (ref 70–110)
GLUCOSE BLD STRIP.AUTO-MCNC: 221 MG/DL (ref 70–110)
GLUCOSE BLD STRIP.AUTO-MCNC: 269 MG/DL (ref 70–110)

## 2023-12-08 PROCEDURE — 6370000000 HC RX 637 (ALT 250 FOR IP): Performed by: FAMILY MEDICINE

## 2023-12-08 PROCEDURE — 99232 SBSQ HOSP IP/OBS MODERATE 35: CPT | Performed by: FAMILY MEDICINE

## 2023-12-08 PROCEDURE — 97535 SELF CARE MNGMENT TRAINING: CPT

## 2023-12-08 PROCEDURE — 97530 THERAPEUTIC ACTIVITIES: CPT

## 2023-12-08 PROCEDURE — 2580000003 HC RX 258: Performed by: INTERNAL MEDICINE

## 2023-12-08 PROCEDURE — 2580000003 HC RX 258: Performed by: NURSE ANESTHETIST, CERTIFIED REGISTERED

## 2023-12-08 PROCEDURE — 1100000000 HC RM PRIVATE

## 2023-12-08 PROCEDURE — 6360000002 HC RX W HCPCS: Performed by: ORTHOPAEDIC SURGERY

## 2023-12-08 PROCEDURE — 82962 GLUCOSE BLOOD TEST: CPT

## 2023-12-08 PROCEDURE — 93971 EXTREMITY STUDY: CPT | Performed by: INTERNAL MEDICINE

## 2023-12-08 PROCEDURE — 93971 EXTREMITY STUDY: CPT

## 2023-12-08 PROCEDURE — 6370000000 HC RX 637 (ALT 250 FOR IP): Performed by: INTERNAL MEDICINE

## 2023-12-08 RX ADMIN — DOCUSATE SODIUM LIQUID 50 MG: 100 LIQUID ORAL at 08:32

## 2023-12-08 RX ADMIN — OXYCODONE HYDROCHLORIDE 5 MG: 5 TABLET ORAL at 12:13

## 2023-12-08 RX ADMIN — ACETAMINOPHEN 500 MG: 500 TABLET ORAL at 08:31

## 2023-12-08 RX ADMIN — ENOXAPARIN SODIUM 40 MG: 100 INJECTION SUBCUTANEOUS at 08:32

## 2023-12-08 RX ADMIN — INSULIN LISPRO 1 UNITS: 100 INJECTION, SOLUTION INTRAVENOUS; SUBCUTANEOUS at 16:32

## 2023-12-08 RX ADMIN — METOPROLOL SUCCINATE 100 MG: 50 TABLET, EXTENDED RELEASE ORAL at 08:32

## 2023-12-08 RX ADMIN — SODIUM CHLORIDE, PRESERVATIVE FREE 10 ML: 5 INJECTION INTRAVENOUS at 23:30

## 2023-12-08 RX ADMIN — OXYCODONE HYDROCHLORIDE 5 MG: 5 TABLET ORAL at 05:32

## 2023-12-08 RX ADMIN — INSULIN LISPRO 1 UNITS: 100 INJECTION, SOLUTION INTRAVENOUS; SUBCUTANEOUS at 12:13

## 2023-12-08 RX ADMIN — OXYCODONE HYDROCHLORIDE 5 MG: 5 TABLET ORAL at 23:28

## 2023-12-08 RX ADMIN — SODIUM CHLORIDE, POTASSIUM CHLORIDE, SODIUM LACTATE AND CALCIUM CHLORIDE: 600; 310; 30; 20 INJECTION, SOLUTION INTRAVENOUS at 01:09

## 2023-12-08 RX ADMIN — PAROXETINE HYDROCHLORIDE 20 MG: 20 TABLET, FILM COATED ORAL at 08:32

## 2023-12-08 RX ADMIN — ROSUVASTATIN CALCIUM 10 MG: 10 TABLET, COATED ORAL at 23:29

## 2023-12-08 RX ADMIN — AMLODIPINE BESYLATE 5 MG: 5 TABLET ORAL at 08:32

## 2023-12-08 ASSESSMENT — PAIN SCALES - GENERAL
PAINLEVEL_OUTOF10: 8
PAINLEVEL_OUTOF10: 10
PAINLEVEL_OUTOF10: 3
PAINLEVEL_OUTOF10: 0
PAINLEVEL_OUTOF10: 8
PAINLEVEL_OUTOF10: 5

## 2023-12-08 ASSESSMENT — PAIN DESCRIPTION - ORIENTATION
ORIENTATION: LEFT

## 2023-12-08 ASSESSMENT — PAIN DESCRIPTION - LOCATION
LOCATION: HIP
LOCATION: HIP
LOCATION: LEG;HIP

## 2023-12-08 ASSESSMENT — PAIN DESCRIPTION - DESCRIPTORS
DESCRIPTORS: ACHING;THROBBING
DESCRIPTORS: ACHING
DESCRIPTORS: ACHING

## 2023-12-09 LAB
BASOPHILS # BLD: 0.1 K/UL (ref 0–0.1)
BASOPHILS NFR BLD: 1 % (ref 0–2)
DIFFERENTIAL METHOD BLD: ABNORMAL
EOSINOPHIL # BLD: 0.2 K/UL (ref 0–0.4)
EOSINOPHIL NFR BLD: 2 % (ref 0–5)
ERYTHROCYTE [DISTWIDTH] IN BLOOD BY AUTOMATED COUNT: 12.9 % (ref 11.6–14.5)
GLUCOSE BLD STRIP.AUTO-MCNC: 195 MG/DL (ref 70–110)
GLUCOSE BLD STRIP.AUTO-MCNC: 197 MG/DL (ref 70–110)
GLUCOSE BLD STRIP.AUTO-MCNC: 221 MG/DL (ref 70–110)
GLUCOSE BLD STRIP.AUTO-MCNC: 225 MG/DL (ref 70–110)
HCT VFR BLD AUTO: 31.5 % (ref 35–45)
HGB BLD-MCNC: 10.9 G/DL (ref 12–16)
IMM GRANULOCYTES # BLD AUTO: 0 K/UL (ref 0–0.04)
IMM GRANULOCYTES NFR BLD AUTO: 0 % (ref 0–0.5)
LYMPHOCYTES # BLD: 1.6 K/UL (ref 0.9–3.6)
LYMPHOCYTES NFR BLD: 15 % (ref 21–52)
MCH RBC QN AUTO: 29.4 PG (ref 24–34)
MCHC RBC AUTO-ENTMCNC: 34.6 G/DL (ref 31–37)
MCV RBC AUTO: 84.9 FL (ref 78–100)
MONOCYTES # BLD: 0.6 K/UL (ref 0.05–1.2)
MONOCYTES NFR BLD: 6 % (ref 3–10)
NEUTS SEG # BLD: 8 K/UL (ref 1.8–8)
NEUTS SEG NFR BLD: 76 % (ref 40–73)
NRBC # BLD: 0 K/UL (ref 0–0.01)
NRBC BLD-RTO: 0 PER 100 WBC
PLATELET # BLD AUTO: 277 K/UL (ref 135–420)
PMV BLD AUTO: 9.4 FL (ref 9.2–11.8)
RBC # BLD AUTO: 3.71 M/UL (ref 4.2–5.3)
WBC # BLD AUTO: 10.5 K/UL (ref 4.6–13.2)

## 2023-12-09 PROCEDURE — 6360000002 HC RX W HCPCS: Performed by: ORTHOPAEDIC SURGERY

## 2023-12-09 PROCEDURE — 36415 COLL VENOUS BLD VENIPUNCTURE: CPT

## 2023-12-09 PROCEDURE — 6370000000 HC RX 637 (ALT 250 FOR IP): Performed by: FAMILY MEDICINE

## 2023-12-09 PROCEDURE — 1100000000 HC RM PRIVATE

## 2023-12-09 PROCEDURE — 6370000000 HC RX 637 (ALT 250 FOR IP): Performed by: INTERNAL MEDICINE

## 2023-12-09 PROCEDURE — 82962 GLUCOSE BLOOD TEST: CPT

## 2023-12-09 PROCEDURE — 85025 COMPLETE CBC W/AUTO DIFF WBC: CPT

## 2023-12-09 PROCEDURE — 2580000003 HC RX 258: Performed by: INTERNAL MEDICINE

## 2023-12-09 PROCEDURE — 99231 SBSQ HOSP IP/OBS SF/LOW 25: CPT | Performed by: FAMILY MEDICINE

## 2023-12-09 RX ADMIN — PAROXETINE HYDROCHLORIDE 20 MG: 20 TABLET, FILM COATED ORAL at 09:25

## 2023-12-09 RX ADMIN — ENOXAPARIN SODIUM 40 MG: 100 INJECTION SUBCUTANEOUS at 09:25

## 2023-12-09 RX ADMIN — METOPROLOL SUCCINATE 100 MG: 50 TABLET, EXTENDED RELEASE ORAL at 09:23

## 2023-12-09 RX ADMIN — OXYCODONE HYDROCHLORIDE 5 MG: 5 TABLET ORAL at 17:32

## 2023-12-09 RX ADMIN — DOCUSATE SODIUM LIQUID 50 MG: 100 LIQUID ORAL at 21:15

## 2023-12-09 RX ADMIN — ROSUVASTATIN CALCIUM 10 MG: 10 TABLET, COATED ORAL at 21:18

## 2023-12-09 RX ADMIN — AMLODIPINE BESYLATE 5 MG: 5 TABLET ORAL at 09:25

## 2023-12-09 RX ADMIN — SODIUM CHLORIDE, PRESERVATIVE FREE 10 ML: 5 INJECTION INTRAVENOUS at 09:26

## 2023-12-09 RX ADMIN — SODIUM CHLORIDE, PRESERVATIVE FREE 10 ML: 5 INJECTION INTRAVENOUS at 21:19

## 2023-12-09 RX ADMIN — INSULIN LISPRO 1 UNITS: 100 INJECTION, SOLUTION INTRAVENOUS; SUBCUTANEOUS at 17:32

## 2023-12-09 RX ADMIN — OXYCODONE HYDROCHLORIDE 5 MG: 5 TABLET ORAL at 09:25

## 2023-12-09 RX ADMIN — INSULIN LISPRO 1 UNITS: 100 INJECTION, SOLUTION INTRAVENOUS; SUBCUTANEOUS at 11:35

## 2023-12-09 ASSESSMENT — PAIN DESCRIPTION - DESCRIPTORS: DESCRIPTORS: ACHING

## 2023-12-09 ASSESSMENT — PAIN DESCRIPTION - LOCATION: LOCATION: HIP

## 2023-12-09 ASSESSMENT — PAIN SCALES - GENERAL
PAINLEVEL_OUTOF10: 9
PAINLEVEL_OUTOF10: 3
PAINLEVEL_OUTOF10: 0
PAINLEVEL_OUTOF10: 2
PAINLEVEL_OUTOF10: 3

## 2023-12-09 ASSESSMENT — PAIN SCALES - WONG BAKER: WONGBAKER_NUMERICALRESPONSE: 0

## 2023-12-09 ASSESSMENT — PAIN DESCRIPTION - ORIENTATION: ORIENTATION: LEFT

## 2023-12-10 LAB
GLUCOSE BLD STRIP.AUTO-MCNC: 162 MG/DL (ref 70–110)
GLUCOSE BLD STRIP.AUTO-MCNC: 179 MG/DL (ref 70–110)
GLUCOSE BLD STRIP.AUTO-MCNC: 231 MG/DL (ref 70–110)
GLUCOSE BLD STRIP.AUTO-MCNC: 235 MG/DL (ref 70–110)
GLUCOSE BLD STRIP.AUTO-MCNC: 259 MG/DL (ref 70–110)

## 2023-12-10 PROCEDURE — 6370000000 HC RX 637 (ALT 250 FOR IP): Performed by: FAMILY MEDICINE

## 2023-12-10 PROCEDURE — 6360000002 HC RX W HCPCS: Performed by: ORTHOPAEDIC SURGERY

## 2023-12-10 PROCEDURE — 6370000000 HC RX 637 (ALT 250 FOR IP): Performed by: INTERNAL MEDICINE

## 2023-12-10 PROCEDURE — 99232 SBSQ HOSP IP/OBS MODERATE 35: CPT | Performed by: FAMILY MEDICINE

## 2023-12-10 PROCEDURE — 82962 GLUCOSE BLOOD TEST: CPT

## 2023-12-10 PROCEDURE — 2580000003 HC RX 258: Performed by: INTERNAL MEDICINE

## 2023-12-10 PROCEDURE — 1100000000 HC RM PRIVATE

## 2023-12-10 RX ORDER — LACTULOSE 10 G/15ML
20 SOLUTION ORAL 3 TIMES DAILY
Status: DISCONTINUED | OUTPATIENT
Start: 2023-12-10 | End: 2023-12-11 | Stop reason: HOSPADM

## 2023-12-10 RX ORDER — DOCUSATE SODIUM 100 MG/1
100 CAPSULE, LIQUID FILLED ORAL 2 TIMES DAILY
Status: DISCONTINUED | OUTPATIENT
Start: 2023-12-10 | End: 2023-12-11 | Stop reason: HOSPADM

## 2023-12-10 RX ADMIN — AMLODIPINE BESYLATE 5 MG: 5 TABLET ORAL at 08:14

## 2023-12-10 RX ADMIN — INSULIN LISPRO 1 UNITS: 100 INJECTION, SOLUTION INTRAVENOUS; SUBCUTANEOUS at 11:39

## 2023-12-10 RX ADMIN — OXYCODONE HYDROCHLORIDE 5 MG: 5 TABLET ORAL at 18:05

## 2023-12-10 RX ADMIN — PAROXETINE HYDROCHLORIDE 20 MG: 20 TABLET, FILM COATED ORAL at 08:14

## 2023-12-10 RX ADMIN — METOPROLOL SUCCINATE 100 MG: 50 TABLET, EXTENDED RELEASE ORAL at 08:16

## 2023-12-10 RX ADMIN — DOCUSATE SODIUM 100 MG: 100 CAPSULE, LIQUID FILLED ORAL at 12:05

## 2023-12-10 RX ADMIN — INSULIN LISPRO 1 UNITS: 100 INJECTION, SOLUTION INTRAVENOUS; SUBCUTANEOUS at 11:38

## 2023-12-10 RX ADMIN — ENOXAPARIN SODIUM 40 MG: 100 INJECTION SUBCUTANEOUS at 08:17

## 2023-12-10 RX ADMIN — SODIUM CHLORIDE, PRESERVATIVE FREE 10 ML: 5 INJECTION INTRAVENOUS at 21:32

## 2023-12-10 RX ADMIN — INSULIN LISPRO 1 UNITS: 100 INJECTION, SOLUTION INTRAVENOUS; SUBCUTANEOUS at 16:28

## 2023-12-10 RX ADMIN — ROSUVASTATIN CALCIUM 10 MG: 10 TABLET, COATED ORAL at 21:31

## 2023-12-10 RX ADMIN — OXYCODONE HYDROCHLORIDE 5 MG: 5 TABLET ORAL at 02:21

## 2023-12-10 RX ADMIN — SODIUM CHLORIDE, PRESERVATIVE FREE 10 ML: 5 INJECTION INTRAVENOUS at 12:06

## 2023-12-10 RX ADMIN — DOCUSATE SODIUM LIQUID 50 MG: 100 LIQUID ORAL at 08:14

## 2023-12-10 RX ADMIN — LACTULOSE 20 G: 20 SOLUTION ORAL at 14:24

## 2023-12-10 RX ADMIN — INSULIN LISPRO 1 UNITS: 100 INJECTION, SOLUTION INTRAVENOUS; SUBCUTANEOUS at 16:24

## 2023-12-10 RX ADMIN — OXYCODONE HYDROCHLORIDE 5 MG: 5 TABLET ORAL at 11:36

## 2023-12-10 ASSESSMENT — PAIN DESCRIPTION - ORIENTATION
ORIENTATION: LEFT
ORIENTATION: LEFT
ORIENTATION: DISTAL

## 2023-12-10 ASSESSMENT — PAIN SCALES - WONG BAKER
WONGBAKER_NUMERICALRESPONSE: 0

## 2023-12-10 ASSESSMENT — PAIN - FUNCTIONAL ASSESSMENT
PAIN_FUNCTIONAL_ASSESSMENT: PREVENTS OR INTERFERES SOME ACTIVE ACTIVITIES AND ADLS
PAIN_FUNCTIONAL_ASSESSMENT: ACTIVITIES ARE NOT PREVENTED
PAIN_FUNCTIONAL_ASSESSMENT: ACTIVITIES ARE NOT PREVENTED

## 2023-12-10 ASSESSMENT — PAIN SCALES - GENERAL
PAINLEVEL_OUTOF10: 0
PAINLEVEL_OUTOF10: 7
PAINLEVEL_OUTOF10: 7
PAINLEVEL_OUTOF10: 0
PAINLEVEL_OUTOF10: 7
PAINLEVEL_OUTOF10: 0

## 2023-12-10 ASSESSMENT — PAIN DESCRIPTION - DESCRIPTORS
DESCRIPTORS: ACHING

## 2023-12-10 ASSESSMENT — PAIN DESCRIPTION - FREQUENCY
FREQUENCY: INTERMITTENT
FREQUENCY: INTERMITTENT

## 2023-12-10 ASSESSMENT — PAIN DESCRIPTION - ONSET
ONSET: GRADUAL
ONSET: GRADUAL

## 2023-12-10 ASSESSMENT — PAIN DESCRIPTION - PAIN TYPE
TYPE: SURGICAL PAIN
TYPE: SURGICAL PAIN

## 2023-12-10 ASSESSMENT — PAIN DESCRIPTION - DIRECTION
RADIATING_TOWARDS: LEFT LEG
RADIATING_TOWARDS: LEFT LEG

## 2023-12-10 ASSESSMENT — PAIN DESCRIPTION - LOCATION
LOCATION: HIP
LOCATION: HIP
LOCATION: BACK

## 2023-12-10 NOTE — PLAN OF CARE
Problem: Discharge Planning  Goal: Discharge to home or other facility with appropriate resources  12/6/2023 1101 by Ingris Chen RN  Outcome: Progressing  12/6/2023 0713 by Ingris Chen RN  Outcome: Progressing     Problem: Pain  Goal: Verbalizes/displays adequate comfort level or baseline comfort level  12/6/2023 1101 by Ingris Chen RN  Outcome: Progressing  12/6/2023 0713 by Ingris Chen RN  Outcome: Progressing     Problem: Skin/Tissue Integrity  Goal: Absence of new skin breakdown  Description: 1. Monitor for areas of redness and/or skin breakdown  2. Assess vascular access sites hourly  3. Every 4-6 hours minimum:  Change oxygen saturation probe site  4. Every 4-6 hours:  If on nasal continuous positive airway pressure, respiratory therapy assess nares and determine need for appliance change or resting period.   12/6/2023 1101 by Ingris Chen RN  Outcome: Progressing  12/6/2023 0713 by Ingris Chen RN  Outcome: Progressing     Problem: Safety - Adult  Goal: Free from fall injury  Outcome: Progressing     Problem: ABCDS Injury Assessment  Goal: Absence of physical injury  Outcome: Progressing
Problem: Discharge Planning  Goal: Discharge to home or other facility with appropriate resources  Outcome: Progressing     Problem: Pain  Goal: Verbalizes/displays adequate comfort level or baseline comfort level  Outcome: Progressing     Problem: Skin/Tissue Integrity  Goal: Absence of new skin breakdown  Description: 1. Monitor for areas of redness and/or skin breakdown  2. Assess vascular access sites hourly  3. Every 4-6 hours minimum:  Change oxygen saturation probe site  4. Every 4-6 hours:  If on nasal continuous positive airway pressure, respiratory therapy assess nares and determine need for appliance change or resting period.   Outcome: Progressing
Problem: Discharge Planning  Goal: Discharge to home or other facility with appropriate resources  Outcome: Progressing     Problem: Pain  Goal: Verbalizes/displays adequate comfort level or baseline comfort level  Outcome: Progressing     Problem: Skin/Tissue Integrity  Goal: Absence of new skin breakdown  Description: 1. Monitor for areas of redness and/or skin breakdown  2. Assess vascular access sites hourly  3. Every 4-6 hours minimum:  Change oxygen saturation probe site  4. Every 4-6 hours:  If on nasal continuous positive airway pressure, respiratory therapy assess nares and determine need for appliance change or resting period.   Outcome: Progressing     Problem: Safety - Adult  Goal: Free from fall injury  Outcome: Progressing     Problem: ABCDS Injury Assessment  Goal: Absence of physical injury  Outcome: Progressing     Problem: Chronic Conditions and Co-morbidities  Goal: Patient's chronic conditions and co-morbidity symptoms are monitored and maintained or improved  Outcome: Progressing
Problem: Occupational Therapy - Adult  Goal: By Discharge: Performs self-care activities at highest level of function for planned discharge setting. See evaluation for individualized goals. Description: Occupational Therapy Goals:  Initiated 12/7/2023 to be met within 7-10 days. 1.  Patient will perform bed mobility for ADLs with supervision/set-up. 2.  Patient will perform grooming with supervision/set-up while sitting EOB for > 5 min. 3.  Patient will perform bathing with minimal assistance/contact guard assist.  4.  Patient will perform toilet transfers with minimal assistance/contact guard assist.  5.  Patient will perform all aspects of toileting with minimal assistance/contact guard assist.  6.  Patient will utilize energy conservation techniques during functional activities with verbal cues. PLOF: Pt lives alone, her daughter is her caregiver, assisting with ADLs during the day. Pt uses w/c for functional mobility, performs toilet txfrs and toileting unassisted. Outcome: Progressing   OCCUPATIONAL THERAPY TREATMENT    Patient: Caprice Sadler (12 y.o. female)  Date: 12/8/2023  Diagnosis: Closed displaced fracture of left femoral neck ([address]) [S72.002A]  S/P right hip fracture [Z87.81] Closed displaced fracture of left femoral neck (HCC)  Procedure(s) (LRB):  LEFT HIP CLOSED REDUCTION WITH PERCUTANEOUS SCREWS (Left) 2 Days Post-Op  Precautions: Fall Risk, Weight Bearing,  , Left Lower Extremity Weight Bearing: Weight Bearing As Tolerated,  ,  ,  ,  ,      Chart, occupational therapy assessment, plan of care, and goals were reviewed. ASSESSMENT:  Pt presented supine in bed upon entry on bed pan and agreeable for participation. Pt required MOD A for holden area hygiene @ bed level. She was assisted to EOB MOD A in prep for functional tasks. Reviewed EC and WB status on L LE. She performed SPT MIN A to reclining chair towards L, simulating BSC transfer.  Once positioned, transport presented in room for
Problem: Pain  Goal: Verbalizes/displays adequate comfort level or baseline comfort level  Outcome: Progressing
Problem: Physical Therapy - Adult  Goal: By Discharge: Performs mobility at highest level of function for planned discharge setting. See evaluation for individualized goals. Description: Physical Therapy Goals  Initiated 12/7/2023 and to be accomplished within 7 day(s)  1. Patient will move from supine to sit and sit to supine  in bed with modified independence. 2.  Patient will transfer from bed to chair and chair to bed with modified independence using the least restrictive device. 3.  Patient will perform sit to stand with modified independence. PLOF: Lives alone. Apartment. Stand pivots to wheelchair. Daughter as aide during day. Non-ambulatory since 2012 d/t stroke with left hemiparesis. Outcome: Progressing     PHYSICAL THERAPY TREATMENT    Patient: Louise Hampton (82 y.o. female)  Date: 12/8/2023  Diagnosis: Closed displaced fracture of left femoral neck ([address]) [S72.002A]  S/P right hip fracture [Z87.81] Closed displaced fracture of left femoral neck (HCC)  Procedure(s) (LRB):  LEFT HIP CLOSED REDUCTION WITH PERCUTANEOUS SCREWS (Left) 2 Days Post-Op  Precautions: Fall Risk, Weight Bearing,  , Left Lower Extremity Weight Bearing: Weight Bearing As Tolerated      ASSESSMENT:  Pt received in bed nad agreeable, has been medicated for pain. Pt tolerated squat pivot to the R bed <> recliner as after she got to recliner had to turn right back to get in bed to go to vascular lab. Pt given education on hand placement and min A for squat pivot, pain with weight baring on the LLE. Pt motivated to participate and regain her independence. Left with all needs met and will continue to follow per POC.      Progression toward goals:   [x]      Improving appropriately and progressing toward goals  []      Improving slowly and progressing toward goals  []      Not making progress toward goals and plan of care will be adjusted     PLAN:  Patient continues to benefit from skilled intervention to address the above
functional mobility ; Decreased ADL status; Decreased strength;Decreased cognition;Decreased balance;Decreased endurance;Decreased posture    Patient will benefit from skilled intervention to address the above impairments. Patient's rehabilitation potential/Prognosis: Good. Factors which may influence rehabilitation potential include:   []             None noted  []             Mental ability/status  [x]             Medical condition  []             Home/family situation and support systems  []             Safety awareness  []             Pain tolerance/management  []             Other:      PLAN :  Recommendations and Planned Interventions:   [x]               Self Care Training                  [x]      Therapeutic Activities  [x]               Functional Mobility Training   []      Cognitive Retraining  [x]               Therapeutic Exercises           [x]      Endurance Activities  [x]               Balance Training                    [x]      Neuromuscular Re-Education  []               Visual/Perceptual Training     [x]      Home Safety Training  [x]               Patient Education                   [x]      Family Training/Education  []               Other (comment):    Frequency/Duration: Patient will be followed by occupational therapy to address goals, 1-2 times per day/3-5 days per week to address goals. Further Equipment Recommendations for Discharge: NA pt has necessary DME    AMPAC: AM-PAC Inpatient Daily Activity Raw Score: 15    Current research shows that an AM-PAC score of 17 or less is not associated with a discharge to the patient's home setting. Based on an AM-PAC score and their current ADL deficits; it is recommended that the patient have 5-7 sessions per week of Occupational Therapy at d/c to increase the patient's independence. Currently, this patient demonstrates the potential endurance, and/or tolerance for 3 hours of therapy each day at d/c.       This AMPAC score should be considered
the above impairments. Patient's rehabilitation potential: Therapy Prognosis: Good. Factors which may influence rehabilitation potential include:  []         None noted  []         Mental ability/status  [x]         Medical condition  []         Home/family situation and support systems  []         Safety awareness  []         Pain tolerance/management  []         Other:      PLAN :  Recommendations and Planned Interventions:   [x]           Bed Mobility Training             [x]    Neuromuscular Re-Education  [x]           Transfer Training                   []    Orthotic/Prosthetic Training  [x]           Gait Training                          []    Modalities  [x]           Therapeutic Exercises           []    Edema Management/Control  [x]           Therapeutic Activities            [x]    Family Training/Education  [x]           Patient Education  []           Other (comment):    Frequency/Duration: Patient will be followed by physical therapy 1-2 times per day/3-5 days per week to address goals. Further Equipment Recommendations for Discharge: wheelchair      AM-PAC Inpatient Mobility without Stair Climbing Raw Score : 13   This AMPA score should be considered in conjunction with interdisciplinary team recommendations to determine the most appropriate discharge setting. Patient's social support, diagnosis, medical stability, and prior level of function should also be taken into consideration. Current research shows that an AM-PAC score of 17 (13 without stairs) or less is not associated with a discharge to the patient's home setting. Based on above AM-PAC score and current functional mobility deficits, it is recommended that the patient have 5-7 sessions per week of Physical Therapy at d/c to increase the patient's independence. Currently, this patient demonstrates the potential endurance, and/or tolerance for 3 hours of therapy each day at d/c.  Pending assistance available from daughter/family as

## 2023-12-11 VITALS
SYSTOLIC BLOOD PRESSURE: 141 MMHG | RESPIRATION RATE: 18 BRPM | TEMPERATURE: 98 F | OXYGEN SATURATION: 98 % | BODY MASS INDEX: 32.44 KG/M2 | HEART RATE: 64 BPM | WEIGHT: 160.94 LBS | DIASTOLIC BLOOD PRESSURE: 87 MMHG | HEIGHT: 59 IN

## 2023-12-11 LAB
GLUCOSE BLD STRIP.AUTO-MCNC: 199 MG/DL (ref 70–110)
GLUCOSE BLD STRIP.AUTO-MCNC: 206 MG/DL (ref 70–110)
GLUCOSE BLD STRIP.AUTO-MCNC: 243 MG/DL (ref 70–110)

## 2023-12-11 PROCEDURE — 2580000003 HC RX 258: Performed by: INTERNAL MEDICINE

## 2023-12-11 PROCEDURE — 94761 N-INVAS EAR/PLS OXIMETRY MLT: CPT

## 2023-12-11 PROCEDURE — 6370000000 HC RX 637 (ALT 250 FOR IP): Performed by: FAMILY MEDICINE

## 2023-12-11 PROCEDURE — 6370000000 HC RX 637 (ALT 250 FOR IP): Performed by: INTERNAL MEDICINE

## 2023-12-11 PROCEDURE — 82962 GLUCOSE BLOOD TEST: CPT

## 2023-12-11 PROCEDURE — 6360000002 HC RX W HCPCS: Performed by: ORTHOPAEDIC SURGERY

## 2023-12-11 PROCEDURE — 99232 SBSQ HOSP IP/OBS MODERATE 35: CPT | Performed by: FAMILY MEDICINE

## 2023-12-11 RX ORDER — OXYCODONE HYDROCHLORIDE 5 MG/1
5 TABLET ORAL EVERY 6 HOURS PRN
Qty: 20 TABLET | Refills: 0 | Status: SHIPPED | OUTPATIENT
Start: 2023-12-11 | End: 2023-12-16

## 2023-12-11 RX ADMIN — METOPROLOL SUCCINATE 100 MG: 50 TABLET, EXTENDED RELEASE ORAL at 08:35

## 2023-12-11 RX ADMIN — PAROXETINE HYDROCHLORIDE 20 MG: 20 TABLET, FILM COATED ORAL at 08:35

## 2023-12-11 RX ADMIN — AMLODIPINE BESYLATE 5 MG: 5 TABLET ORAL at 08:35

## 2023-12-11 RX ADMIN — OXYCODONE HYDROCHLORIDE 5 MG: 5 TABLET ORAL at 06:31

## 2023-12-11 RX ADMIN — DOCUSATE SODIUM 100 MG: 100 CAPSULE, LIQUID FILLED ORAL at 08:35

## 2023-12-11 RX ADMIN — SODIUM CHLORIDE, PRESERVATIVE FREE 10 ML: 5 INJECTION INTRAVENOUS at 09:00

## 2023-12-11 RX ADMIN — OXYCODONE HYDROCHLORIDE 5 MG: 5 TABLET ORAL at 12:41

## 2023-12-11 RX ADMIN — ACETAMINOPHEN 500 MG: 500 TABLET ORAL at 12:40

## 2023-12-11 RX ADMIN — OXYCODONE HYDROCHLORIDE 5 MG: 5 TABLET ORAL at 00:24

## 2023-12-11 RX ADMIN — INSULIN LISPRO 1 UNITS: 100 INJECTION, SOLUTION INTRAVENOUS; SUBCUTANEOUS at 08:34

## 2023-12-11 RX ADMIN — ENOXAPARIN SODIUM 40 MG: 100 INJECTION SUBCUTANEOUS at 08:34

## 2023-12-11 ASSESSMENT — PAIN DESCRIPTION - DESCRIPTORS
DESCRIPTORS: ACHING
DESCRIPTORS: ACHING
DESCRIPTORS: ACHING;THROBBING

## 2023-12-11 ASSESSMENT — PAIN DESCRIPTION - LOCATION
LOCATION: HIP
LOCATION: INCISION;HIP
LOCATION: INCISION;HIP

## 2023-12-11 ASSESSMENT — PAIN - FUNCTIONAL ASSESSMENT
PAIN_FUNCTIONAL_ASSESSMENT: ACTIVITIES ARE NOT PREVENTED
PAIN_FUNCTIONAL_ASSESSMENT: ACTIVITIES ARE NOT PREVENTED

## 2023-12-11 ASSESSMENT — PAIN SCALES - GENERAL
PAINLEVEL_OUTOF10: 8
PAINLEVEL_OUTOF10: 8
PAINLEVEL_OUTOF10: 3
PAINLEVEL_OUTOF10: 9
PAINLEVEL_OUTOF10: 0

## 2023-12-11 ASSESSMENT — PAIN DESCRIPTION - ORIENTATION
ORIENTATION: LEFT
ORIENTATION: LEFT

## 2023-12-11 ASSESSMENT — PAIN SCALES - WONG BAKER: WONGBAKER_NUMERICALRESPONSE: 0

## 2023-12-11 NOTE — CARE COORDINATION
Transportation will not be available until after 11 pm tonight therefore patient will leave 1st thing in the morning

## 2023-12-11 NOTE — CARE COORDINATION
Writer spoke with patient regarding insurance denial for ARU. Writer provide patient with a list of SNF and ask if she would like referrals sent out. Patient states \"I would rather go home and do my therapy. \"     Writer garvey served Dr. Bradford Mixon

## 2023-12-11 NOTE — CARE COORDINATION
Call made to [facility], [facility] [phone], spoke with Kim Lanes, dispatch will call the nurses station with LAMBERTO. Trip # [id number].

## 2023-12-19 ENCOUNTER — HOME HEALTH ADMISSION (OUTPATIENT)
Age: 68
End: 2023-12-19
Payer: MEDICARE

## 2023-12-28 ENCOUNTER — HOME CARE VISIT (OUTPATIENT)
Age: 68
End: 2023-12-28
Payer: MEDICARE

## 2023-12-28 ENCOUNTER — TELEPHONE (OUTPATIENT)
Age: 68
End: 2023-12-28

## 2023-12-28 ENCOUNTER — OFFICE VISIT (OUTPATIENT)
Age: 68
End: 2023-12-28
Payer: MEDICAID

## 2023-12-28 VITALS
TEMPERATURE: 97.5 F | DIASTOLIC BLOOD PRESSURE: 78 MMHG | HEIGHT: 59 IN | BODY MASS INDEX: 32.51 KG/M2 | SYSTOLIC BLOOD PRESSURE: 127 MMHG

## 2023-12-28 DIAGNOSIS — S72.002A CLOSED DISPLACED FRACTURE OF LEFT FEMORAL NECK (HCC): Primary | ICD-10-CM

## 2023-12-28 DIAGNOSIS — T85.848A PAIN FROM IMPLANTED HARDWARE, INITIAL ENCOUNTER: ICD-10-CM

## 2023-12-28 PROCEDURE — 99024 POSTOP FOLLOW-UP VISIT: CPT

## 2023-12-28 PROCEDURE — 73502 X-RAY EXAM HIP UNI 2-3 VIEWS: CPT

## 2023-12-28 RX ORDER — OXYCODONE HYDROCHLORIDE AND ACETAMINOPHEN 5; 325 MG/1; MG/1
1 TABLET ORAL EVERY 6 HOURS PRN
Qty: 28 TABLET | Refills: 0 | Status: SHIPPED | OUTPATIENT
Start: 2023-12-28 | End: 2024-01-04

## 2023-12-28 NOTE — TELEPHONE ENCOUNTER
Called home health to cancel tomorrow's visit. Home Health advised they would call us back if patient needs a new referral for home health for after next surgery.

## 2023-12-28 NOTE — TELEPHONE ENCOUNTER
Spoke with patient's daughter/caregiver and advised her that Ms. Daniella Phipps should stop taking her aspirin in preparation for surgery per Dr. Margie Cowart. Ms. Daniella Phipps prefers that communication go through her daughter, Dayana Jean, as the patient is very forgetful.

## 2023-12-29 DIAGNOSIS — Z98.890 S/P ORIF (OPEN REDUCTION INTERNAL FIXATION) FRACTURE: ICD-10-CM

## 2023-12-29 DIAGNOSIS — Z87.81 S/P ORIF (OPEN REDUCTION INTERNAL FIXATION) FRACTURE: ICD-10-CM

## 2023-12-29 DIAGNOSIS — S72.002A CLOSED DISPLACED FRACTURE OF LEFT FEMORAL NECK (HCC): Primary | ICD-10-CM

## 2023-12-29 DIAGNOSIS — T85.848A PAIN FROM IMPLANTED HARDWARE, INITIAL ENCOUNTER: ICD-10-CM

## 2023-12-29 NOTE — PROGRESS NOTES
Instructions for your surgery at [facility]      Today's Date:  12/29/2023      Patient's Name:  Layne England           Surgery Date:  01/03/2024              Please enter the main entrance of the hospital and check-in at the  located in the lobby. Once checked in at the , you will take the elevators to the second floor, and report to the waiting room on the left. The room will say Procedure Registration.    Do NOT eat or drink anything, including candy, gum, or ice chips after midnight prior to your surgery, unless you have specific instructions from your surgeon or anesthesia provider to do so.  Brush your teeth before coming to the hospital. You may swish with water, but do not swallow.  No smoking/Vaping/E-Cigarettes 24 hours prior to the day of surgery.  No alcohol 24 hours prior to the day of surgery.  No recreational drugs for one week prior to the day of surgery.  Bring Photo ID, Insurance information, and Co-pay if required on day of surgery.  Bring in pertinent legal documents, such as, Medical Power of , DNR, Advance Directive, etc.  Leave all valuables, including money/purse, at home.  Remove all jewelry, including ALL body piercings, nail polish, acrylic nails, and makeup (including mascara); no lotions, powders, deodorant, or perfume/cologne/after shave on the skin.  Follow instruction for Hibiclens washes and CHG wipes from surgeon's office.   Glasses and dentures may be worn to the hospital. They must be removed prior to surgery. Please bring case/container for glasses or dentures.   Contact lenses should not be worn on day of surgery.   Call your doctor's office if symptoms of a cold or illness develop within 24-48 hours prior to your surgery.  Call your doctor's office if you have any questions concerning insurance or co-pays.  15. AN ADULT (relative or friend 18 years or older) MUST DRIVE YOU HOME AFTER YOUR SURGERY.  16. Please make

## 2024-01-02 ENCOUNTER — TELEPHONE (OUTPATIENT)
Facility: HOSPITAL | Age: 69
End: 2024-01-02

## 2024-01-02 ENCOUNTER — ANESTHESIA EVENT (OUTPATIENT)
Facility: HOSPITAL | Age: 69
End: 2024-01-02
Payer: MEDICARE

## 2024-01-02 NOTE — DISCHARGE INSTRUCTIONS
several months after surgery  ?   Use ice several times a day for 30 minutes at a time if ice pack or continuously if using the Ice circulating machine.     Do not apply heat to the wound.    PROCEDURES    Any elective dental cleaning/procedure or invasive procedures like a colonoscopy should not be performed within 3 months following a joint replacement.  ?   You must take antibiotic prophylaxis before any dental cleaning or other invasive procedure.    DRIVING  ?   Necessary travel only for 6 weeks.  ?   You should not drive until you are able to walk WITHOUT a walker (a cane is OK) and are not taking any narcotic pain medications. This is usually around 3-4 weeks.  ?   Most patients do not require a handicapped parking pass. If necessary, we will provide one for a maximum duration of 3 months.    QUESTIONS/CONCERNS    Call the [facility]  at [phone] and request to speak to the Orthopedic Surgery on-call physician.     Medication Requests: Call the [facility] main number at [phone], or the [facility]  at [phone] and request to speak to the Orthopedic Surgery on-call phsyician.     POSTOPERATIVE APPOINTMENT    We will monitor your progress with follow-up clinic visits at approximately 2 and 6 weeks following your surgery.  ?   Follow-up at [facility] clinic as scheduled previously. Phone: [phone]   DISCHARGE SUMMARY from Nurse    PATIENT INSTRUCTIONS:    After general anesthesia or intravenous sedation, for 24 hours or while taking prescription Narcotics:  Limit your activities  Do not drive and operate hazardous machinery  Do not make important personal or business decisions  Do  not drink alcoholic beverages  If you have not urinated within 8 hours after discharge, please contact your surgeon on call.    Report the following to your surgeon:  Excessive pain, swelling, redness or odor of or around the surgical area  Temperature over 100.5  Nausea and vomiting lasting  longer than 4 hours or if unable to take medications  Any signs of decreased circulation or nerve impairment to extremity: change in color, persistent  numbness, tingling, coldness or increase pain  Any questions    What to do at Home:  Recommended activity: activity as tolerated,     If you experience any of the following symptoms Refer to Post operative instructions, please follow up with Dr. Rojo.    *  Please give a list of your current medications to your Primary Care Provider.    *  Please update this list whenever your medications are discontinued, doses are      changed, or new medications (including over-the-counter products) are added.    *  Please carry medication information at all times in case of emergency situations.    These are general instructions for a healthy lifestyle:    No smoking/ No tobacco products/ Avoid exposure to second hand smoke  Surgeon General's Warning:  Quitting smoking now greatly reduces serious risk to your health.    Obesity, smoking, and sedentary lifestyle greatly increases your risk for illness    A healthy diet, regular physical exercise & weight monitoring are important for maintaining a healthy lifestyle    You may be retaining fluid if you have a history of heart failure or if you experience any of the following symptoms:  Weight gain of 3 pounds or more overnight or 5 pounds in a week, increased swelling in our hands or feet or shortness of breath while lying flat in bed.  Please call your doctor as soon as you notice any of these symptoms; do not wait until your next office visit.  Patient armband removed and shredded   Thank you for enrolling in [employer]. Please follow the instructions below to securely access your online medical record. [employer] allows you to send messages to your doctor, view your test results, renew your prescriptions, schedule appointments, and more.     How Do I Sign Up?  In your Internet browser, go to

## 2024-01-02 NOTE — TELEPHONE ENCOUNTER
Call placed to patient, ID verified x 2. Patient  has decided with their surgeon to have a partial hip replacement  to decrease  pain and improve mobility . Topics discussed included surgery preparation, what to expect the day of surgery, medications, physical and occupational therapy, and discharge planning.  It was discussed that this is considered an elective surgery and that prior to the surgery  decisions such as arranging for help at home once they are discharged needs to be made.Patient agreed to get home ready for surgery and to have a ride arranged to go home. She identifies her daughter as her support system and will be spending her first night in the hospital. Patient is completely non ambulatory and has been wheelchair bound since her stroke last year. She lives in a first floor apartment with a ramp. She lives on her own and has been able to function on her own prior to surgery with her daughters help. Patient is able to transfer herself to and from her wheelchair. Instructions were given for CHG bathing. Patient states that she will shower with dial soap.  Patient will complete the procedure the morning of surgery.  Patient was reminded not to apply any deoderant, perfumes, makeup or lotions to skin the morning of surgery. She will remain NPO after midnight the night before surgery and will take only the medications as instructed to take by her surgeon the morning of surgery with a sip of water. Patient was instructed to take her metoprolol and her amlodipine the morning of surgery with a sip of water. A total hip replacement education book will be provided to the patient prior to discharge and all education will be reviewed. Education regarding the importance of early and frequent ambulation to avoid surgical complications and to assist with pain was provided. Recommended the use of ice to assist with pain and swelling post op for 20 minutes an hour, not to be placed directly on her skin. Patient

## 2024-01-03 ENCOUNTER — APPOINTMENT (OUTPATIENT)
Facility: HOSPITAL | Age: 69
End: 2024-01-03
Attending: ORTHOPAEDIC SURGERY
Payer: MEDICARE

## 2024-01-03 ENCOUNTER — ANESTHESIA (OUTPATIENT)
Facility: HOSPITAL | Age: 69
End: 2024-01-03
Payer: MEDICARE

## 2024-01-03 ENCOUNTER — HOSPITAL ENCOUNTER (OUTPATIENT)
Facility: HOSPITAL | Age: 69
Setting detail: OBSERVATION
Discharge: HOME | End: 2024-01-04
Attending: ORTHOPAEDIC SURGERY | Admitting: ORTHOPAEDIC SURGERY
Payer: MEDICARE

## 2024-01-03 DIAGNOSIS — Z96.649 STATUS POST HIP HEMIARTHROPLASTY: Primary | ICD-10-CM

## 2024-01-03 LAB
ABO + RH BLD: NORMAL
AMPHET UR QL SCN: NEGATIVE
BARBITURATES UR QL SCN: NEGATIVE
BENZODIAZ UR QL: NEGATIVE
BLOOD GROUP ANTIBODIES SERPL: NORMAL
CANNABINOIDS UR QL SCN: POSITIVE
COCAINE UR QL SCN: NEGATIVE
EST. AVERAGE GLUCOSE BLD GHB EST-MCNC: 206 MG/DL
GLUCOSE BLD STRIP.AUTO-MCNC: 229 MG/DL (ref 70–110)
GLUCOSE BLD STRIP.AUTO-MCNC: 254 MG/DL (ref 70–110)
GLUCOSE BLD STRIP.AUTO-MCNC: 279 MG/DL (ref 70–110)
HBA1C MFR BLD: 8.8 % (ref 4.2–5.6)
Lab: ABNORMAL
METHADONE UR QL: NEGATIVE
OPIATES UR QL: NEGATIVE
PCP UR QL: NEGATIVE
SPECIMEN EXP DATE BLD: NORMAL

## 2024-01-03 PROCEDURE — 6360000002 HC RX W HCPCS

## 2024-01-03 PROCEDURE — C1713 ANCHOR/SCREW BN/BN,TIS/BN: HCPCS | Performed by: ORTHOPAEDIC SURGERY

## 2024-01-03 PROCEDURE — G0378 HOSPITAL OBSERVATION PER HR: HCPCS

## 2024-01-03 PROCEDURE — 7100000000 HC PACU RECOVERY - FIRST 15 MIN: Performed by: ORTHOPAEDIC SURGERY

## 2024-01-03 PROCEDURE — 6370000000 HC RX 637 (ALT 250 FOR IP): Performed by: NURSE ANESTHETIST, CERTIFIED REGISTERED

## 2024-01-03 PROCEDURE — A4217 STERILE WATER/SALINE, 500 ML: HCPCS | Performed by: ORTHOPAEDIC SURGERY

## 2024-01-03 PROCEDURE — 6370000000 HC RX 637 (ALT 250 FOR IP)

## 2024-01-03 PROCEDURE — 3700000000 HC ANESTHESIA ATTENDED CARE: Performed by: ORTHOPAEDIC SURGERY

## 2024-01-03 PROCEDURE — 86900 BLOOD TYPING SEROLOGIC ABO: CPT

## 2024-01-03 PROCEDURE — 2709999900 HC NON-CHARGEABLE SUPPLY: Performed by: ORTHOPAEDIC SURGERY

## 2024-01-03 PROCEDURE — 83036 HEMOGLOBIN GLYCOSYLATED A1C: CPT

## 2024-01-03 PROCEDURE — 2500000003 HC RX 250 WO HCPCS: Performed by: NURSE ANESTHETIST, CERTIFIED REGISTERED

## 2024-01-03 PROCEDURE — 2580000003 HC RX 258

## 2024-01-03 PROCEDURE — 73502 X-RAY EXAM HIP UNI 2-3 VIEWS: CPT

## 2024-01-03 PROCEDURE — A4216 STERILE WATER/SALINE, 10 ML: HCPCS | Performed by: ORTHOPAEDIC SURGERY

## 2024-01-03 PROCEDURE — 97165 OT EVAL LOW COMPLEX 30 MIN: CPT

## 2024-01-03 PROCEDURE — 36415 COLL VENOUS BLD VENIPUNCTURE: CPT

## 2024-01-03 PROCEDURE — 2580000003 HC RX 258: Performed by: NURSE ANESTHETIST, CERTIFIED REGISTERED

## 2024-01-03 PROCEDURE — 97530 THERAPEUTIC ACTIVITIES: CPT

## 2024-01-03 PROCEDURE — 6360000002 HC RX W HCPCS: Performed by: NURSE ANESTHETIST, CERTIFIED REGISTERED

## 2024-01-03 PROCEDURE — 6360000002 HC RX W HCPCS: Performed by: ANESTHESIOLOGY

## 2024-01-03 PROCEDURE — 2500000003 HC RX 250 WO HCPCS

## 2024-01-03 PROCEDURE — 97535 SELF CARE MNGMENT TRAINING: CPT

## 2024-01-03 PROCEDURE — 97162 PT EVAL MOD COMPLEX 30 MIN: CPT

## 2024-01-03 PROCEDURE — C1776 JOINT DEVICE (IMPLANTABLE): HCPCS | Performed by: ORTHOPAEDIC SURGERY

## 2024-01-03 PROCEDURE — 3700000001 HC ADD 15 MINUTES (ANESTHESIA): Performed by: ORTHOPAEDIC SURGERY

## 2024-01-03 PROCEDURE — 6360000002 HC RX W HCPCS: Performed by: ORTHOPAEDIC SURGERY

## 2024-01-03 PROCEDURE — 2580000003 HC RX 258: Performed by: ORTHOPAEDIC SURGERY

## 2024-01-03 PROCEDURE — 86850 RBC ANTIBODY SCREEN: CPT

## 2024-01-03 PROCEDURE — 82962 GLUCOSE BLOOD TEST: CPT

## 2024-01-03 PROCEDURE — 7100000001 HC PACU RECOVERY - ADDTL 15 MIN: Performed by: ORTHOPAEDIC SURGERY

## 2024-01-03 PROCEDURE — 80307 DRUG TEST PRSMV CHEM ANLYZR: CPT

## 2024-01-03 PROCEDURE — 86901 BLOOD TYPING SEROLOGIC RH(D): CPT

## 2024-01-03 PROCEDURE — 3600000002 HC SURGERY LEVEL 2 BASE: Performed by: ORTHOPAEDIC SURGERY

## 2024-01-03 PROCEDURE — 3600000012 HC SURGERY LEVEL 2 ADDTL 15MIN: Performed by: ORTHOPAEDIC SURGERY

## 2024-01-03 DEVICE — CEMENT BNE 20ML 41GM FULL DOSE PMMA W/ TOBRA M VISC RADPQ: Type: IMPLANTABLE DEVICE | Site: HIP | Status: FUNCTIONAL

## 2024-01-03 DEVICE — HEAD FEM OD42MM ID26MM UNIV CO CHROM COMPHSVE SZ ARRY FOR: Type: IMPLANTABLE DEVICE | Site: HIP | Status: FUNCTIONAL

## 2024-01-03 DEVICE — HEAD FEM DIA26MM -3MM OFFSET HIP CO CHROM POLYETH TAPR LO: Type: IMPLANTABLE DEVICE | Site: HIP | Status: FUNCTIONAL

## 2024-01-03 DEVICE — STEM FEM SZ 4 L137MM NK L35MM +46MM OFFSET 127DEG STD HIP: Type: IMPLANTABLE DEVICE | Site: HIP | Status: FUNCTIONAL

## 2024-01-03 DEVICE — SPACER FEM OD11MM UNIV DST HIP CEM ACCOLADE: Type: IMPLANTABLE DEVICE | Site: HIP | Status: FUNCTIONAL

## 2024-01-03 RX ORDER — SODIUM CHLORIDE, SODIUM LACTATE, POTASSIUM CHLORIDE, CALCIUM CHLORIDE 600; 310; 30; 20 MG/100ML; MG/100ML; MG/100ML; MG/100ML
INJECTION, SOLUTION INTRAVENOUS CONTINUOUS
Status: DISCONTINUED | OUTPATIENT
Start: 2024-01-03 | End: 2024-01-03 | Stop reason: HOSPADM

## 2024-01-03 RX ORDER — ONDANSETRON 2 MG/ML
4 INJECTION INTRAMUSCULAR; INTRAVENOUS EVERY 6 HOURS PRN
Status: DISCONTINUED | OUTPATIENT
Start: 2024-01-03 | End: 2024-01-04 | Stop reason: HOSPADM

## 2024-01-03 RX ORDER — BISACODYL 5 MG/1
5 TABLET, DELAYED RELEASE ORAL DAILY
Status: DISCONTINUED | OUTPATIENT
Start: 2024-01-03 | End: 2024-01-04 | Stop reason: HOSPADM

## 2024-01-03 RX ORDER — MELOXICAM 15 MG/1
15 TABLET ORAL DAILY
Qty: 30 TABLET | Refills: 1 | Status: SHIPPED | OUTPATIENT
Start: 2024-01-03 | End: 2024-03-03

## 2024-01-03 RX ORDER — FAMOTIDINE 20 MG/1
20 TABLET, FILM COATED ORAL ONCE
Status: COMPLETED | OUTPATIENT
Start: 2024-01-03 | End: 2024-01-03

## 2024-01-03 RX ORDER — PANTOPRAZOLE SODIUM 40 MG/1
40 TABLET, DELAYED RELEASE ORAL DAILY
Qty: 30 TABLET | Refills: 0 | Status: SHIPPED | OUTPATIENT
Start: 2024-01-03 | End: 2024-02-02

## 2024-01-03 RX ORDER — DEXAMETHASONE SODIUM PHOSPHATE 10 MG/ML
10 INJECTION, SOLUTION INTRAMUSCULAR; INTRAVENOUS ONCE
Status: COMPLETED | OUTPATIENT
Start: 2024-01-03 | End: 2024-01-03

## 2024-01-03 RX ORDER — FENTANYL CITRATE 50 UG/ML
INJECTION, SOLUTION INTRAMUSCULAR; INTRAVENOUS PRN
Status: DISCONTINUED | OUTPATIENT
Start: 2024-01-03 | End: 2024-01-03 | Stop reason: SDUPTHER

## 2024-01-03 RX ORDER — DEXTROSE MONOHYDRATE 100 MG/ML
INJECTION, SOLUTION INTRAVENOUS CONTINUOUS PRN
Status: DISCONTINUED | OUTPATIENT
Start: 2024-01-03 | End: 2024-01-03 | Stop reason: HOSPADM

## 2024-01-03 RX ORDER — GLYCOPYRROLATE 0.2 MG/ML
INJECTION INTRAMUSCULAR; INTRAVENOUS PRN
Status: DISCONTINUED | OUTPATIENT
Start: 2024-01-03 | End: 2024-01-03 | Stop reason: SDUPTHER

## 2024-01-03 RX ORDER — SODIUM CHLORIDE, SODIUM LACTATE, POTASSIUM CHLORIDE, CALCIUM CHLORIDE 600; 310; 30; 20 MG/100ML; MG/100ML; MG/100ML; MG/100ML
INJECTION, SOLUTION INTRAVENOUS CONTINUOUS
Status: DISCONTINUED | OUTPATIENT
Start: 2024-01-03 | End: 2024-01-04 | Stop reason: HOSPADM

## 2024-01-03 RX ORDER — OXYCODONE HYDROCHLORIDE 5 MG/1
5 TABLET ORAL EVERY 4 HOURS PRN
Status: DISCONTINUED | OUTPATIENT
Start: 2024-01-03 | End: 2024-01-04 | Stop reason: HOSPADM

## 2024-01-03 RX ORDER — FENTANYL CITRATE 50 UG/ML
50 INJECTION, SOLUTION INTRAMUSCULAR; INTRAVENOUS EVERY 5 MIN PRN
Status: DISCONTINUED | OUTPATIENT
Start: 2024-01-03 | End: 2024-01-03 | Stop reason: HOSPADM

## 2024-01-03 RX ORDER — MELOXICAM 7.5 MG/1
7.5 TABLET ORAL ONCE
Status: COMPLETED | OUTPATIENT
Start: 2024-01-03 | End: 2024-01-03

## 2024-01-03 RX ORDER — FAMOTIDINE 20 MG/1
20 TABLET, FILM COATED ORAL 2 TIMES DAILY
Status: DISCONTINUED | OUTPATIENT
Start: 2024-01-03 | End: 2024-01-04 | Stop reason: HOSPADM

## 2024-01-03 RX ORDER — ACETAMINOPHEN 500 MG
1000 TABLET ORAL EVERY 8 HOURS
Qty: 180 TABLET | Refills: 0 | Status: SHIPPED | OUTPATIENT
Start: 2024-01-03 | End: 2024-02-02

## 2024-01-03 RX ORDER — POLYETHYLENE GLYCOL 3350 17 G/17G
17 POWDER, FOR SOLUTION ORAL DAILY PRN
Status: DISCONTINUED | OUTPATIENT
Start: 2024-01-03 | End: 2024-01-04 | Stop reason: HOSPADM

## 2024-01-03 RX ORDER — ONDANSETRON 4 MG/1
4 TABLET, ORALLY DISINTEGRATING ORAL EVERY 8 HOURS PRN
Status: DISCONTINUED | OUTPATIENT
Start: 2024-01-03 | End: 2024-01-04 | Stop reason: HOSPADM

## 2024-01-03 RX ORDER — LIDOCAINE HYDROCHLORIDE 20 MG/ML
INJECTION, SOLUTION EPIDURAL; INFILTRATION; INTRACAUDAL; PERINEURAL PRN
Status: DISCONTINUED | OUTPATIENT
Start: 2024-01-03 | End: 2024-01-03 | Stop reason: SDUPTHER

## 2024-01-03 RX ORDER — ACETAMINOPHEN 500 MG
1000 TABLET ORAL ONCE
Status: COMPLETED | OUTPATIENT
Start: 2024-01-03 | End: 2024-01-03

## 2024-01-03 RX ORDER — VANCOMYCIN HYDROCHLORIDE 1 G/20ML
INJECTION, POWDER, LYOPHILIZED, FOR SOLUTION INTRAVENOUS PRN
Status: DISCONTINUED | OUTPATIENT
Start: 2024-01-03 | End: 2024-01-03 | Stop reason: ALTCHOICE

## 2024-01-03 RX ORDER — ASPIRIN 81 MG/1
81 TABLET ORAL 2 TIMES DAILY
Status: DISCONTINUED | OUTPATIENT
Start: 2024-01-04 | End: 2024-01-04 | Stop reason: HOSPADM

## 2024-01-03 RX ORDER — PROPOFOL 10 MG/ML
INJECTION, EMULSION INTRAVENOUS PRN
Status: DISCONTINUED | OUTPATIENT
Start: 2024-01-03 | End: 2024-01-03 | Stop reason: SDUPTHER

## 2024-01-03 RX ORDER — DEXAMETHASONE SODIUM PHOSPHATE 10 MG/ML
5 INJECTION, SOLUTION INTRAMUSCULAR; INTRAVENOUS ONCE
Status: COMPLETED | OUTPATIENT
Start: 2024-01-04 | End: 2024-01-04

## 2024-01-03 RX ORDER — SODIUM CHLORIDE 0.9 % (FLUSH) 0.9 %
5-40 SYRINGE (ML) INJECTION PRN
Status: DISCONTINUED | OUTPATIENT
Start: 2024-01-03 | End: 2024-01-03 | Stop reason: HOSPADM

## 2024-01-03 RX ORDER — ONDANSETRON 2 MG/ML
4 INJECTION INTRAMUSCULAR; INTRAVENOUS
Status: DISCONTINUED | OUTPATIENT
Start: 2024-01-03 | End: 2024-01-03 | Stop reason: HOSPADM

## 2024-01-03 RX ORDER — DEXAMETHASONE SODIUM PHOSPHATE 4 MG/ML
INJECTION, SOLUTION INTRA-ARTICULAR; INTRALESIONAL; INTRAMUSCULAR; INTRAVENOUS; SOFT TISSUE PRN
Status: DISCONTINUED | OUTPATIENT
Start: 2024-01-03 | End: 2024-01-03 | Stop reason: SDUPTHER

## 2024-01-03 RX ORDER — ONDANSETRON 8 MG/1
8 TABLET, ORALLY DISINTEGRATING ORAL EVERY 8 HOURS PRN
Qty: 10 TABLET | Refills: 0 | Status: SHIPPED | OUTPATIENT
Start: 2024-01-03

## 2024-01-03 RX ORDER — SODIUM CHLORIDE 0.9 % (FLUSH) 0.9 %
5-40 SYRINGE (ML) INJECTION PRN
Status: DISCONTINUED | OUTPATIENT
Start: 2024-01-03 | End: 2024-01-04 | Stop reason: HOSPADM

## 2024-01-03 RX ORDER — DIPHENHYDRAMINE HYDROCHLORIDE 50 MG/ML
25 INJECTION INTRAMUSCULAR; INTRAVENOUS EVERY 6 HOURS PRN
Status: DISCONTINUED | OUTPATIENT
Start: 2024-01-03 | End: 2024-01-04 | Stop reason: HOSPADM

## 2024-01-03 RX ORDER — INSULIN LISPRO 100 [IU]/ML
0-15 INJECTION, SOLUTION INTRAVENOUS; SUBCUTANEOUS ONCE
Status: COMPLETED | OUTPATIENT
Start: 2024-01-03 | End: 2024-01-03

## 2024-01-03 RX ORDER — ASPIRIN 81 MG/1
81 TABLET, CHEWABLE ORAL 2 TIMES DAILY
Qty: 60 TABLET | Refills: 0 | Status: SHIPPED | OUTPATIENT
Start: 2024-01-03 | End: 2024-02-02

## 2024-01-03 RX ORDER — EPHEDRINE SULFATE/0.9% NACL/PF 50 MG/5 ML
SYRINGE (ML) INTRAVENOUS PRN
Status: DISCONTINUED | OUTPATIENT
Start: 2024-01-03 | End: 2024-01-03 | Stop reason: SDUPTHER

## 2024-01-03 RX ORDER — OXYCODONE HYDROCHLORIDE 5 MG/1
5 TABLET ORAL EVERY 6 HOURS PRN
Qty: 10 TABLET | Refills: 0 | Status: SHIPPED | OUTPATIENT
Start: 2024-01-03 | End: 2024-01-10

## 2024-01-03 RX ORDER — ENEMA 19; 7 G/133ML; G/133ML
1 ENEMA RECTAL DAILY PRN
Status: DISCONTINUED | OUTPATIENT
Start: 2024-01-03 | End: 2024-01-04 | Stop reason: HOSPADM

## 2024-01-03 RX ORDER — SODIUM CHLORIDE 0.9 % (FLUSH) 0.9 %
5-40 SYRINGE (ML) INJECTION EVERY 12 HOURS SCHEDULED
Status: DISCONTINUED | OUTPATIENT
Start: 2024-01-03 | End: 2024-01-03 | Stop reason: HOSPADM

## 2024-01-03 RX ORDER — PHENYLEPHRINE HCL IN 0.9% NACL 1 MG/10 ML
SYRINGE (ML) INTRAVENOUS PRN
Status: DISCONTINUED | OUTPATIENT
Start: 2024-01-03 | End: 2024-01-03 | Stop reason: SDUPTHER

## 2024-01-03 RX ORDER — SODIUM CHLORIDE 9 MG/ML
INJECTION, SOLUTION INTRAVENOUS PRN
Status: DISCONTINUED | OUTPATIENT
Start: 2024-01-03 | End: 2024-01-03 | Stop reason: HOSPADM

## 2024-01-03 RX ORDER — DOCUSATE SODIUM 100 MG/1
100 CAPSULE, LIQUID FILLED ORAL 2 TIMES DAILY
Qty: 60 CAPSULE | Refills: 0 | Status: SHIPPED | OUTPATIENT
Start: 2024-01-03 | End: 2024-02-02

## 2024-01-03 RX ORDER — DIPHENHYDRAMINE HCL 25 MG
25 CAPSULE ORAL EVERY 6 HOURS PRN
Status: DISCONTINUED | OUTPATIENT
Start: 2024-01-03 | End: 2024-01-04 | Stop reason: HOSPADM

## 2024-01-03 RX ORDER — KETOROLAC TROMETHAMINE 15 MG/ML
15 INJECTION, SOLUTION INTRAMUSCULAR; INTRAVENOUS EVERY 6 HOURS
Status: DISCONTINUED | OUTPATIENT
Start: 2024-01-03 | End: 2024-01-04 | Stop reason: HOSPADM

## 2024-01-03 RX ORDER — ONDANSETRON 2 MG/ML
INJECTION INTRAMUSCULAR; INTRAVENOUS PRN
Status: DISCONTINUED | OUTPATIENT
Start: 2024-01-03 | End: 2024-01-03 | Stop reason: SDUPTHER

## 2024-01-03 RX ORDER — MIDAZOLAM HYDROCHLORIDE 1 MG/ML
INJECTION INTRAMUSCULAR; INTRAVENOUS PRN
Status: DISCONTINUED | OUTPATIENT
Start: 2024-01-03 | End: 2024-01-03 | Stop reason: SDUPTHER

## 2024-01-03 RX ORDER — TRAMADOL HYDROCHLORIDE 50 MG/1
50 TABLET ORAL EVERY 6 HOURS
Status: DISCONTINUED | OUTPATIENT
Start: 2024-01-03 | End: 2024-01-04 | Stop reason: HOSPADM

## 2024-01-03 RX ORDER — SODIUM CHLORIDE 9 MG/ML
INJECTION, SOLUTION INTRAVENOUS PRN
Status: DISCONTINUED | OUTPATIENT
Start: 2024-01-03 | End: 2024-01-04 | Stop reason: HOSPADM

## 2024-01-03 RX ORDER — ROCURONIUM BROMIDE 10 MG/ML
INJECTION, SOLUTION INTRAVENOUS PRN
Status: DISCONTINUED | OUTPATIENT
Start: 2024-01-03 | End: 2024-01-03 | Stop reason: SDUPTHER

## 2024-01-03 RX ORDER — ACETAMINOPHEN 500 MG
1000 TABLET ORAL EVERY 8 HOURS SCHEDULED
Status: DISCONTINUED | OUTPATIENT
Start: 2024-01-03 | End: 2024-01-04 | Stop reason: HOSPADM

## 2024-01-03 RX ORDER — TRAMADOL HYDROCHLORIDE 50 MG/1
50 TABLET ORAL EVERY 6 HOURS PRN
Qty: 28 TABLET | Refills: 0 | Status: SHIPPED | OUTPATIENT
Start: 2024-01-03 | End: 2024-01-10

## 2024-01-03 RX ORDER — FENTANYL CITRATE 50 UG/ML
100 INJECTION, SOLUTION INTRAMUSCULAR; INTRAVENOUS ONCE
Status: COMPLETED | OUTPATIENT
Start: 2024-01-03 | End: 2024-01-03

## 2024-01-03 RX ORDER — OXYCODONE HYDROCHLORIDE 10 MG/1
10 TABLET ORAL EVERY 4 HOURS PRN
Status: DISCONTINUED | OUTPATIENT
Start: 2024-01-03 | End: 2024-01-04 | Stop reason: HOSPADM

## 2024-01-03 RX ORDER — SODIUM CHLORIDE 0.9 % (FLUSH) 0.9 %
5-40 SYRINGE (ML) INJECTION EVERY 12 HOURS SCHEDULED
Status: DISCONTINUED | OUTPATIENT
Start: 2024-01-03 | End: 2024-01-04 | Stop reason: HOSPADM

## 2024-01-03 RX ORDER — SUCCINYLCHOLINE/SOD CL,ISO/PF 100 MG/5ML
SYRINGE (ML) INTRAVENOUS PRN
Status: DISCONTINUED | OUTPATIENT
Start: 2024-01-03 | End: 2024-01-03 | Stop reason: SDUPTHER

## 2024-01-03 RX ORDER — PALONOSETRON 0.05 MG/ML
0.07 INJECTION, SOLUTION INTRAVENOUS ONCE
Status: COMPLETED | OUTPATIENT
Start: 2024-01-03 | End: 2024-01-03

## 2024-01-03 RX ORDER — HYDROMORPHONE HYDROCHLORIDE 2 MG/ML
INJECTION, SOLUTION INTRAMUSCULAR; INTRAVENOUS; SUBCUTANEOUS PRN
Status: DISCONTINUED | OUTPATIENT
Start: 2024-01-03 | End: 2024-01-03 | Stop reason: SDUPTHER

## 2024-01-03 RX ORDER — LIDOCAINE HYDROCHLORIDE 10 MG/ML
1 INJECTION, SOLUTION EPIDURAL; INFILTRATION; INTRACAUDAL; PERINEURAL
Status: DISCONTINUED | OUTPATIENT
Start: 2024-01-03 | End: 2024-01-03 | Stop reason: HOSPADM

## 2024-01-03 RX ORDER — NEOSTIGMINE METHYLSULFATE 1 MG/ML
INJECTION, SOLUTION INTRAVENOUS PRN
Status: DISCONTINUED | OUTPATIENT
Start: 2024-01-03 | End: 2024-01-03 | Stop reason: SDUPTHER

## 2024-01-03 RX ADMIN — DEXAMETHASONE SODIUM PHOSPHATE 5 MG: 4 INJECTION INTRA-ARTICULAR; INTRALESIONAL; INTRAMUSCULAR; INTRAVENOUS; SOFT TISSUE at 10:55

## 2024-01-03 RX ADMIN — PROPOFOL 30 MG: 10 INJECTION, EMULSION INTRAVENOUS at 13:09

## 2024-01-03 RX ADMIN — FENTANYL CITRATE 50 MCG: 50 INJECTION INTRAMUSCULAR; INTRAVENOUS at 10:47

## 2024-01-03 RX ADMIN — Medication 3 MG: at 13:04

## 2024-01-03 RX ADMIN — ACETAMINOPHEN 1000 MG: 500 TABLET ORAL at 22:10

## 2024-01-03 RX ADMIN — KETOROLAC TROMETHAMINE 15 MG: 15 INJECTION, SOLUTION INTRAMUSCULAR; INTRAVENOUS at 20:51

## 2024-01-03 RX ADMIN — HYDROMORPHONE HYDROCHLORIDE 0.5 MG: 1 INJECTION, SOLUTION INTRAMUSCULAR; INTRAVENOUS; SUBCUTANEOUS at 14:18

## 2024-01-03 RX ADMIN — TRANEXAMIC ACID 1000 MG: 100 INJECTION, SOLUTION INTRAVENOUS at 11:18

## 2024-01-03 RX ADMIN — KETOROLAC TROMETHAMINE 15 MG: 15 INJECTION, SOLUTION INTRAMUSCULAR; INTRAVENOUS at 14:58

## 2024-01-03 RX ADMIN — FENTANYL CITRATE 100 MCG: 50 INJECTION, SOLUTION INTRAMUSCULAR; INTRAVENOUS at 09:39

## 2024-01-03 RX ADMIN — Medication 5 MG: at 12:11

## 2024-01-03 RX ADMIN — MIDAZOLAM 1 MG: 1 INJECTION, SOLUTION INTRAMUSCULAR; INTRAVENOUS at 10:42

## 2024-01-03 RX ADMIN — HYDROMORPHONE HYDROCHLORIDE 0.4 MG: 2 INJECTION INTRAMUSCULAR; INTRAVENOUS; SUBCUTANEOUS at 13:35

## 2024-01-03 RX ADMIN — ROCURONIUM BROMIDE 25 MG: 10 INJECTION, SOLUTION INTRAVENOUS at 10:58

## 2024-01-03 RX ADMIN — Medication 100 MCG: at 12:11

## 2024-01-03 RX ADMIN — HYDROMORPHONE HYDROCHLORIDE 0.2 MG: 2 INJECTION INTRAMUSCULAR; INTRAVENOUS; SUBCUTANEOUS at 13:01

## 2024-01-03 RX ADMIN — SODIUM CHLORIDE, SODIUM LACTATE, POTASSIUM CHLORIDE, AND CALCIUM CHLORIDE: 600; 310; 30; 20 INJECTION, SOLUTION INTRAVENOUS at 08:56

## 2024-01-03 RX ADMIN — PROPOFOL 100 MG: 10 INJECTION, EMULSION INTRAVENOUS at 10:47

## 2024-01-03 RX ADMIN — MELOXICAM 7.5 MG: 7.5 TABLET ORAL at 09:00

## 2024-01-03 RX ADMIN — TRAMADOL HYDROCHLORIDE 50 MG: 50 TABLET ORAL at 16:28

## 2024-01-03 RX ADMIN — LIDOCAINE HYDROCHLORIDE 60 MG: 20 INJECTION, SOLUTION EPIDURAL; INFILTRATION; INTRACAUDAL; PERINEURAL at 10:47

## 2024-01-03 RX ADMIN — TRAMADOL HYDROCHLORIDE 50 MG: 50 TABLET ORAL at 20:51

## 2024-01-03 RX ADMIN — Medication 200 MCG: at 11:04

## 2024-01-03 RX ADMIN — TRANEXAMIC ACID 1000 MG: 100 INJECTION, SOLUTION INTRAVENOUS at 12:31

## 2024-01-03 RX ADMIN — HYDROMORPHONE HYDROCHLORIDE 0.5 MG: 1 INJECTION, SOLUTION INTRAMUSCULAR; INTRAVENOUS; SUBCUTANEOUS at 14:32

## 2024-01-03 RX ADMIN — INSULIN LISPRO 9 UNITS: 100 INJECTION, SOLUTION INTRAVENOUS; SUBCUTANEOUS at 09:02

## 2024-01-03 RX ADMIN — MIDAZOLAM 1 MG: 1 INJECTION, SOLUTION INTRAMUSCULAR; INTRAVENOUS at 10:45

## 2024-01-03 RX ADMIN — ROCURONIUM BROMIDE 10 MG: 10 INJECTION, SOLUTION INTRAVENOUS at 11:49

## 2024-01-03 RX ADMIN — SODIUM CHLORIDE, SODIUM LACTATE, POTASSIUM CHLORIDE, AND CALCIUM CHLORIDE: 600; 310; 30; 20 INJECTION, SOLUTION INTRAVENOUS at 12:40

## 2024-01-03 RX ADMIN — PALONOSETRON HYDROCHLORIDE 0.07 MG: 0.25 INJECTION INTRAVENOUS at 09:03

## 2024-01-03 RX ADMIN — SODIUM CHLORIDE, PRESERVATIVE FREE 10 ML: 5 INJECTION INTRAVENOUS at 20:51

## 2024-01-03 RX ADMIN — HYDROMORPHONE HYDROCHLORIDE 0.5 MG: 1 INJECTION, SOLUTION INTRAMUSCULAR; INTRAVENOUS; SUBCUTANEOUS at 14:00

## 2024-01-03 RX ADMIN — ROCURONIUM BROMIDE 5 MG: 10 INJECTION, SOLUTION INTRAVENOUS at 10:47

## 2024-01-03 RX ADMIN — INSULIN LISPRO 6 UNITS: 100 INJECTION, SOLUTION INTRAVENOUS; SUBCUTANEOUS at 14:06

## 2024-01-03 RX ADMIN — FAMOTIDINE 20 MG: 20 TABLET, FILM COATED ORAL at 16:28

## 2024-01-03 RX ADMIN — Medication 100 MG: at 10:47

## 2024-01-03 RX ADMIN — WATER 2000 MG: 1 INJECTION, SOLUTION INTRAMUSCULAR; INTRAVENOUS; SUBCUTANEOUS at 10:56

## 2024-01-03 RX ADMIN — BISACODYL 5 MG: 5 TABLET, COATED ORAL at 16:28

## 2024-01-03 RX ADMIN — FAMOTIDINE 20 MG: 20 TABLET, FILM COATED ORAL at 20:51

## 2024-01-03 RX ADMIN — CEFAZOLIN 2000 MG: 1 INJECTION, POWDER, FOR SOLUTION INTRAMUSCULAR; INTRAVENOUS at 19:30

## 2024-01-03 RX ADMIN — ACETAMINOPHEN 1000 MG: 500 TABLET ORAL at 09:00

## 2024-01-03 RX ADMIN — ACETAMINOPHEN 1000 MG: 500 TABLET ORAL at 16:28

## 2024-01-03 RX ADMIN — DEXAMETHASONE SODIUM PHOSPHATE 5 MG: 10 INJECTION INTRAMUSCULAR; INTRAVENOUS at 11:00

## 2024-01-03 RX ADMIN — FAMOTIDINE 20 MG: 20 TABLET, FILM COATED ORAL at 09:00

## 2024-01-03 RX ADMIN — GLYCOPYRROLATE 0.2 MG: 0.4 INJECTION INTRAMUSCULAR; INTRAVENOUS at 13:04

## 2024-01-03 RX ADMIN — ONDANSETRON 4 MG: 2 INJECTION INTRAMUSCULAR; INTRAVENOUS at 12:32

## 2024-01-03 RX ADMIN — FENTANYL CITRATE 50 MCG: 50 INJECTION INTRAMUSCULAR; INTRAVENOUS at 11:33

## 2024-01-03 ASSESSMENT — PAIN DESCRIPTION - DESCRIPTORS
DESCRIPTORS: PATIENT UNABLE TO DESCRIBE
DESCRIPTORS: PATIENT UNABLE TO DESCRIBE
DESCRIPTORS: ACHING
DESCRIPTORS: PATIENT UNABLE TO DESCRIBE
DESCRIPTORS: PATIENT UNABLE TO DESCRIBE

## 2024-01-03 ASSESSMENT — PAIN SCALES - GENERAL
PAINLEVEL_OUTOF10: 0
PAINLEVEL_OUTOF10: 7
PAINLEVEL_OUTOF10: 9
PAINLEVEL_OUTOF10: 8
PAINLEVEL_OUTOF10: 5
PAINLEVEL_OUTOF10: 0
PAINLEVEL_OUTOF10: 7
PAINLEVEL_OUTOF10: 5

## 2024-01-03 ASSESSMENT — PAIN DESCRIPTION - ORIENTATION
ORIENTATION: LEFT

## 2024-01-03 ASSESSMENT — PAIN DESCRIPTION - LOCATION
LOCATION: HIP

## 2024-01-03 ASSESSMENT — PAIN DESCRIPTION - PAIN TYPE
TYPE: SURGICAL PAIN

## 2024-01-03 ASSESSMENT — PAIN - FUNCTIONAL ASSESSMENT: PAIN_FUNCTIONAL_ASSESSMENT: 0-10

## 2024-01-03 ASSESSMENT — PAIN SCALES - WONG BAKER: WONGBAKER_NUMERICALRESPONSE: 0

## 2024-01-03 NOTE — PLAN OF CARE
Problem: Occupational Therapy - Adult  Goal: By Discharge: Performs self-care activities at highest level of function for planned discharge setting.  See evaluation for individualized goals.  Description: Occupational Therapy Goals:  Initiated 1/3/2024 to be met within 7-10 days.    1.  Patient will perform bed mobility for ADLs with supervision/set-up.   2.  Patient will perform seated grooming task at EOB with supervision/set-up with Good balance.  3.  Patient will perform upper body dressing with minimal assistance/contact guard assist.  4.  Patient will perform toilet transfers with minimal assistance/contact guard assist.  5.  Patient will perform all aspects of toileting with minimal assistance/contact guard assist.  6.  Patient will participate in upper extremity therapeutic exercise/activities with supervision/set-up for 8 minutes to improve endurance and UB strength needed for ADLs    7.  Patient will utilize energy conservation techniques during functional activities with verbal cues.    PLOF: Pt lives alone, pt's daughter or caregiver there most of the day until 4pm assisting with LB dressing, bathing and with most functional transfers. Pt is alone at night.  Outcome: Progressing  OCCUPATIONAL THERAPY EVALUATION    Patient: Layne England (68 y.o. female)  Date: 1/3/2024  Primary Diagnosis: Other mechanical complication of internal fixation device of left femur, initial encounter ([facility]) [T84.195A]  Status post hip hemiarthroplasty [Z96.649]  Procedure(s) (LRB):  LEFT HIP CONVERSION OF HIP PINNING TO HEMIARTHROPLASTY, POSTEROLATERAL (Left) Day of Surgery   Precautions: Fall Risk, Surgical Protocols, Weight Bearing,  , Left Lower Extremity Weight Bearing: Weight Bearing As Tolerated,Hip Precautions: Posterior hip precautions    ASSESSMENT :    Pt is drowsy, however, agreeable to OT evaluation with supportive daughter at bedside. Pt required cues to keep eyes open during the session. Pt and pt's daughter  Prevention Strategies;Family Education  Education Method: Demonstration;Verbal;Teach Back  Barriers to Learning: None  Education Outcome: Continued education needed    Thank you for this referral.  Humphrey Johnson OTR/L  Minutes: 19    Eval Complexity: Decision Making: Low Complexity

## 2024-01-03 NOTE — PLAN OF CARE
Problem: Chronic Conditions and Co-morbidities  Goal: Patient's chronic conditions and co-morbidity symptoms are monitored and maintained or improved  1/3/2024 1706 by Shanika Flores RN  Outcome: Progressing  1/3/2024 1553 by Shanika Flores RN  Outcome: Progressing     Problem: Safety - Adult  Goal: Free from fall injury  1/3/2024 1706 by Shanika Flores RN  Outcome: Progressing  1/3/2024 1553 by Shanika Flores RN  Outcome: Progressing     Problem: Pain  Goal: Verbalizes/displays adequate comfort level or baseline comfort level  1/3/2024 1706 by Shanika Flores RN  Outcome: Progressing  1/3/2024 1553 by Shanika Flores RN  Outcome: Progressing  1/3/2024 1553 by Shanika Flores RN  Outcome: Progressing     Problem: Discharge Planning  Goal: Discharge to home or other facility with appropriate resources  1/3/2024 1706 by Shanika Flores RN  Outcome: Progressing  1/3/2024 1553 by Shanika Flores RN  Outcome: Progressing  1/3/2024 1553 by Shanika Flores RN  Outcome: Progressing     Problem: Occupational Therapy - Adult  Goal: By Discharge: Performs self-care activities at highest level of function for planned discharge setting.  See evaluation for individualized goals.  Description: Occupational Therapy Goals:  Initiated 1/3/2024 to be met within 7-10 days.    1.  Patient will perform bed mobility for ADLs with supervision/set-up.   2.  Patient will perform seated grooming task at EOB with supervision/set-up with Good balance.  3.  Patient will perform upper body dressing with minimal assistance/contact guard assist.  4.  Patient will perform toilet transfers with minimal assistance/contact guard assist.  5.  Patient will perform all aspects of toileting with minimal assistance/contact guard assist.  6.  Patient will participate in upper extremity therapeutic exercise/activities with supervision/set-up for 8 minutes to improve endurance and UB strength needed for ADLs    7.  Patient will utilize energy  conservation techniques during functional activities with verbal cues.    PLOF: Pt lives alone, pt's daughter or caregiver there most of the day until 4pm assisting with LB dressing, bathing and with most functional transfers. Pt is alone at night.  1/3/2024 1622 by Humphrey Johnson, OTR/L  Outcome: Progressing     Problem: Physical Therapy - Adult  Goal: By Discharge: Performs mobility at highest level of function for planned discharge setting.  See evaluation for individualized goals.  Description: Physical Therapy Goals  Initiated 1/3/2024 and to be accomplished within 7 day(s)  1.  Patient will move from supine to sit and sit to supine  in bed with minimal assistance/contact guard assist.    2.  Patient will transfer from bed to chair and chair to bed with supervision/set-up using the least restrictive device.  3.  Patient will perform sit to stand with supervision/set-up.    PLOF: Lives alone in one story apartment with ramp entry. Daughter and caregivers during day; alone from 4pm to overnight. Transfers to wheelchair. 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 Fall with hip fracture and ORIF Dec 2023; now converted to hemiarthroplasty d/t loosening of hardware.   1/3/2024 1625 by Sherry Garrido, PT  Outcome: Progressing

## 2024-01-03 NOTE — ANESTHESIA PRE PROCEDURE
Department of Anesthesiology  Preprocedure Note       Name:  Layne England   Age:  68 y.o.  :  1955                                          MRN:  [id number]         Date:  1/3/2024      Surgeon: Surgeon(s):  Srini Rojo DO    Procedure: Procedure(s):  LEFT HIP CONVERSION OF HIP PINNING TO HEMIARTHROPLASTY, POSTEROLATERAL; PEG BOARD, FLAT PLATE X-RAY; 23 HR.; [[facility]]    Medications prior to admission:   Prior to Admission medications    Medication Sig Start Date End Date Taking? Authorizing Provider   oxyCODONE-acetaminophen (PERCOCET) 5-325 MG per tablet Take 1 tablet by mouth every 6 hours as needed for Pain for up to 7 days. Take lowest dose possible to manage pain Max Daily Amount: 4 tablets 23  Elvie Lott PA-C   glipiZIDE (GLUCOTROL) 10 MG tablet Take 1 tablet by mouth 2 times daily (before meals)    ProviderSuri MD   metFORMIN (GLUCOPHAGE) 500 MG tablet Take 1 tablet by mouth daily (with breakfast)    ProviderSuri MD   hydroCHLOROthiazide (HYDRODIURIL) 25 MG tablet Take 1 tablet by mouth daily    ProviderSuri MD   lisinopril (PRINIVIL;ZESTRIL) 40 MG tablet Take 1 tablet by mouth daily    ProviderSuri MD   potassium chloride (KLOR-CON) 10 MEQ extended release tablet Take 1 tablet by mouth daily    ProviderSuri MD   ondansetron (ZOFRAN-ODT) 4 MG disintegrating tablet Take 1 tablet by mouth 3 times daily as needed for Nausea or Vomiting 23   Elvie Lott PA-C   amLODIPine (NORVASC) 5 MG tablet Take 2 tablets by mouth daily 18   Automatic Reconciliation, Ar   aspirin 81 MG EC tablet TAKE ONE TABLET EVERY DAY 16   Automatic Reconciliation, Ar   hydrocortisone 1 % lotion Apply topically 2 times daily 5/3/18   Automatic Reconciliation, Ar   losartan-hydroCHLOROthiazide (HYZAAR) 100-25 MG per tablet TAKE ONE TABLET EVERY DAY 17   Automatic Reconciliation, Ar   metoprolol succinate (TOPROL XL) 100 MG 
report given to ALLIE carpio

## 2024-01-03 NOTE — PLAN OF CARE
Problem: Physical Therapy - Adult  Goal: By Discharge: Performs mobility at highest level of function for planned discharge setting.  See evaluation for individualized goals.  Description: Physical Therapy Goals  Initiated 1/3/2024 and to be accomplished within 7 day(s)  1.  Patient will move from supine to sit and sit to supine  in bed with minimal assistance/contact guard assist.    2.  Patient will transfer from bed to chair and chair to bed with supervision/set-up using the least restrictive device.  3.  Patient will perform sit to stand with supervision/set-up.    PLOF: Lives alone in one story apartment with ramp entry. Daughter and caregivers during day; alone from 4pm to overnight. Transfers to wheelchair. 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 Fall with hip fracture and ORIF Dec 2023; now converted to hemiarthroplasty d/t loosening of hardware.   Outcome: Progressing   PHYSICAL THERAPY EVALUATION    Patient: Layne England (68 y.o. female)  Date: 1/3/2024  Primary Diagnosis: Other mechanical complication of internal fixation device of left femur, initial encounter ([facility]) [T84.195A]  Status post hip hemiarthroplasty [Z96.649]  Procedure(s) (LRB):  LEFT HIP CONVERSION OF HIP PINNING TO HEMIARTHROPLASTY, POSTEROLATERAL (Left) Day of Surgery   Precautions: Fall Risk, Surgical Protocols, Weight Bearing,  Left Lower Extremity Weight Bearing: Weight Bearing As Tolerated, Hip Precautions: Posterior hip precautions  ASSESSMENT :  Underwent left hip hemiarthroplasty d/t loosening of hardware on prior ORIF to left femur. Transfers to wheelchair at baseline; see PLOF above. Educated patient and daughter on lateral hip precautions. Drowsy; cues to maintain alertness. Mod A x2 for supine to sit. Seated EOB with intermittent cues for balance d/t drowsiness. Min A x2 for sit to stand. Completed twice. Returned to seated EOB. Max A for sit to supine as patient hemiparetic on LLE and previously utilized gisselle-technique  of RLE assist with LLE; no longer compliant with left hip precautions. Seated in bed with HOB elevated. Pillow placed between legs to prevent adduction past midline. Educated on need for RN assistance with mobility; verbalized understanding. Call bell in reach.     DEFICITS/IMPAIRMENTS:   Body Structures, Functions, Activity Limitations Requiring Skilled Therapeutic Intervention: Decreased functional mobility ;Decreased safe awareness;Decreased cognition;Decreased endurance;Decreased balance;Decreased strength    Patient will benefit from skilled intervention to address the above impairments.  Patient's rehabilitation potential: Fair .  Factors which may influence rehabilitation potential include:  []         None noted  []         Mental ability/status  [x]         Medical condition  []         Home/family situation and support systems  []         Safety awareness  []         Pain tolerance/management  []         Other:      PLAN :  Recommendations and Planned Interventions:   [x]           Bed Mobility Training             [x]    Neuromuscular Re-Education  [x]           Transfer Training                   []    Orthotic/Prosthetic Training  [x]           Gait Training                          []    Modalities  [x]           Therapeutic Exercises           []    Edema Management/Control  [x]           Therapeutic Activities            [x]    Family Training/Education  [x]           Patient Education  []           Other (comment):    Frequency/Duration: Patient will be followed by physical therapy 1-2 times per day/3-5 days per week to address goals.    Further Equipment Recommendations for Discharge: wheelchair       AM-PAC Inpatient Mobility without Stair Climbing Raw Score : 12   This [facility] score should be considered in conjunction with interdisciplinary team recommendations to determine the most appropriate discharge setting. Patient's social support, diagnosis, medical stability, and prior level of function

## 2024-01-03 NOTE — NURSE NAVIGATOR
Rounded on patient s/p LEFT HIP CONVERSION OF HIP PINNING TO HEMIARTHROPLASTY, POSTEROLATERAL with Dr. Rojo, dos 01/03/2023. Patient observed to be alert and oriented x 3, sitting up in bed, daughter at bedside. She denies chest pain, shortness of breath, nausea, or vomiting. Dressing to left hip observed to be clean, dry and intact with ice pack applied for comfort. Patient currently rating pain 5/10 at this time, but falling asleep while talking. Patient is unable to purposefully move her left leg due to prior stroke. Per daughter mother is able to pivot only on right leg to transfer and surgery was performed to aviate pain that her mother was having after original surgery. Education provided to patient regarding the use of incentive spirometer. Patient encouraged to use ten times hourly while in hospital and to continue use at home to keep lungs expanded and free from complications. Encouraged icing 20 minutes every hour, not to be placed directly on patients skin to assist with pain and swelling. Patient provided with donnell hose. Encouraged patient to wear during daytime hours to assist with swelling. Patient and daughter verbalized understanding of all information provided. Per daughter mother is incontinent at times.Romero was placed during surgery.  Purwick may be appropriate for nighttime wear for patients comfort to allow for rest. Will continue to monitor until discharge. Physical therapy working with patient at this time. Will continue to monitor patient until discharge and then postoperatively.

## 2024-01-03 NOTE — OP NOTE
Operative Note      Patient: Layne England  YOB: 1955  MRN: [id number]    Date of Procedure: 1/3/2024    Pre-op diagnosis:  Failed left hip pinning    Post-Op Diagnosis: Same       Procedure(s):  LEFT HIP CONVERSION OF HIP PINNING TO HEMIARTHROPLASTY, POSTEROLATERAL    Surgeon(s):  Srini Rojo DO    Assistant:   Surgical Assistant: Wai Diaz    Anesthesia: General    Estimated Blood Loss (mL): 200     Complications: None    Specimens:   * No specimens in log *    Implants:  Implant Name Type Inv. Item Serial No.  Lot No. LRB No. Used Action   SCREW BNE L75MM [id number] THRD L32MM TI SHERYL PARTIALLY THRDED - [id number]  SCREW BNE L75MM [id number] THRD L32MM TI SHERYL PARTIALLY THRDED  [employer] [place] [id number] Left 1 Explanted   SCREW BNE L85MM DIA7.3MM THRD L32MM TI ST SELF DRL [employer] L - [id number]  SCREW BNE L85MM DIA7.3MM THRD L32MM TI ST SELF DRL SHERYL L  [employer] [place] [id number] Left 1 Explanted   CEMENT BNE 20ML 41GM FULL DOSE PMMA W/ TOBRA M VISC RADPQ - [id number]  CEMENT BNE 20ML 41GM FULL DOSE PMMA W/ TOBRA M VISC RADPQ  [facility] [facility] [id number] Left 2 Implanted   STEM FEM SZ 4 L137MM NK L35MM +46MM OFFSET 127DEG STD HIP - [id number]  STEM FEM SZ 4 L137MM NK L35MM +46MM OFFSET 127DEG STD HIP  [facility] [facility] [id number] Left 1 Implanted   SPACER FEM OD11MM UNIV DST HIP CHRISTIANE ACCOLADE - [id number]  SPACER FEM OD11MM UNIV DST HIP CHRISTIANE ACCOLADE  [facility] [facility] [id number] Left 1 Implanted   HEAD FEM [id number] -3MM OFFSET HIP CO CHROM POLYETH TAPR LO - [id number]  HEAD FEM [id number] -3MM OFFSET HIP CO CHROM POLYETH TAPR LO [id number] [employer] [facility] [id number] Left 1 Implanted   HEAD FEM OD42MM ID26MM UNIV CO CHROM COMPHSVE SZ ARRY FOR - [id number]  HEAD FEM OD42MM ID26MM [facility] CO CHROM COMPEVANGELISTAVE MINDA GUEVARA FOR [id number] [facility] [employer] HOWM-WD [id number] Left 1 Implanted         Drains:   [REMOVED] Urinary Catheter 01/03/24 2 Way;Romero (Removed)      ensure the right patient, side, and procedure to be performed. The patient received weight-based preoperative antibiotics and received txa.     A standard anterolateral approach to the hip was performed. After incising the IT band, the vastus was palpated and the screws located. The vastus was split inline with the fibers and the screwheads exposed.  All three screws were removed.  The toggling had widened the aperture on the lateral cortex of each screw.  The femur was externally rotated and the neck was cut 1cm from the lesser trochanter. The head was exposed and extracted and then measured.  Trial heads were tested for appropriate sizing.  The femoral canal was exposed and the shoulder was cleared of soft tissue. The femoral canal was instrumented. The bone was very osteopenic and of poor quality.     Sequential reaming and broaching was performed until axial and rotational stability of the broach was apparent.  Without trialing due to the poor bone quality and fear of fracture, the final femoral stem was cemented in place using meticulous fourth generation technique. A trial head was placed and the hip reduced.  Mild shuck noted with good stability. A bone hook was used to dislocate. The trunnion was cleaned and the bipolar head ball placed. Leg lengths felt appropriate. A saline wash was performed. Local infiltrative analgesia was injected into the surrounding tissues. The capsule and abductors were repaired with a soft tissue repair consisting of a #5 ethibond. The Iliotibial band was closed with #1 vicryl and a running # 2 quill suture. Hollie’s layer was approximated with a running #1 vicryl suture. The dermis was closed with 2-0 monocryl suture followed by a running 2-0 monocryl Quill stitch in the subcuticular layer. Skin glue and mesh and Mepilex were applied. At the end of the procedure, all counts were correct times two. The patient was transferred to PACU in stable condition. There were no immediate

## 2024-01-03 NOTE — PLAN OF CARE
Problem: Chronic Conditions and Co-morbidities  Goal: Patient's chronic conditions and co-morbidity symptoms are monitored and maintained or improved  Outcome: Progressing     Problem: Safety - Adult  Goal: Free from fall injury  Outcome: Progressing     Problem: Pain  Goal: Verbalizes/displays adequate comfort level or baseline comfort level  1/3/2024 1553 by Shanika Flores RN  Outcome: Progressing  1/3/2024 1553 by Shanika Flores RN  Outcome: Progressing     Problem: Discharge Planning  Goal: Discharge to home or other facility with appropriate resources  1/3/2024 1553 by Shanika Flores RN  Outcome: Progressing  1/3/2024 1553 by Shanika Flores RN  Outcome: Progressing

## 2024-01-03 NOTE — PLAN OF CARE
Problem: Chronic Conditions and Co-morbidities  Goal: Patient's chronic conditions and co-morbidity symptoms are monitored and maintained or improved  Outcome: Progressing     Problem: Safety - Adult  Goal: Free from fall injury  Outcome: Progressing     Problem: Pain  Goal: Verbalizes/displays adequate comfort level or baseline comfort level  Outcome: Progressing     Problem: Discharge Planning  Goal: Discharge to home or other facility with appropriate resources  Outcome: Progressing

## 2024-01-03 NOTE — PERIOP NOTE
TRANSFER - OUT REPORT:    Verbal report given to Shanika CARRASCO on Layne England  being transferred to 2 Surgical for routine post-op       Report consisted of patient's Situation, Background, Assessment and   Recommendations(SBAR).     Information from the following report(s) Nurse Handoff Report, Adult Overview, Surgery Report, MAR, and Recent Results was reviewed with the receiving nurse.           Lines:   Peripheral IV 01/03/24 Proximal;Right;Anterior Forearm (Active)   Site Assessment Clean, dry & intact 01/03/24 1334   Line Status Infusing 01/03/24 1334   Line Care Connections checked and tightened 01/03/24 1334   Phlebitis Assessment No symptoms 01/03/24 1334   Infiltration Assessment 0 01/03/24 1334   Dressing Status Clean, dry & intact 01/03/24 1334   Dressing Type Transparent 01/03/24 1334        Opportunity for questions and clarification was provided.      Patient transported with:  Tech

## 2024-01-03 NOTE — INTERVAL H&P NOTE
Update History & Physical    The patient's History and Physical of January 3, 2023 was reviewed with the patient and I examined the patient. There was no change. The surgical site was confirmed by the patient and me.     Plan: The risks, benefits, expected outcome, and alternative to the recommended procedure have been discussed with the patient. Patient understands and wants to proceed with the procedure.     Electronically signed by Srini Rojo DO on 1/3/2024 at 10:29 AM

## 2024-01-03 NOTE — H&P
Layne England is a 68 y.o. female accompanied by her daughter.  Worker's Compensation and legal considerations: none          Chief Complaint   Patient presents with    Hip Pain       LT hip    Post-Op Check       Lt Hip Closed Reduction Percutaneous Screws      Pain Score:   5     12/28/2023 HPI: Ms. England is a 67yo female with a PMHx of left sided paralysis 2/2 CVA (10 years ago), HTN, DM who presents today for post-op check approximately 3 weeks status post left hip closed reduction with percutaneous screws for closed displaced fracture of left femoral neck. She reports significantly increasing pain over the last week. Her transfers are becoming more painful as well.      She denies fever, chills, abdominal pain, SOB. She endorses gas and has had intermittent vomiting. Her loose stools she previously had have improved.      12/19/2023 HPI: Patient presents today for post op check approximately 2 weeks s/p left hip closed reduction with percutaneous screws for closed displaced fracture of left femoral neck. Of note, patient had a CVA 10 years ago and is paralyzed on her left side. Patient c/o nausea and vomiting and loose stools. She was taking stool softener but has stopped. She has also stopped taking narcotic pain medication recently due to the nausea. She admits significant improvement in her pain over the last few days. She denies fever, chills, SOB.     Date of onset:  12/5/2023  Injury: fall  Prior Treatment:  closed reduction with percutaneous screws for closed displaced fracture     ROS: Review of Systems - General ROS: negative except HPI     Past Medical History        Past Medical History:   Diagnosis Date    Arthritis      CVA (cerebral vascular accident) ([facility]) 11/2010     Left side paralysis    Diabetes ([facility])      Dyslipidemia 11/27/2012    Hypertension      Osteoarthritis 11/1/2012    Overactive bladder 12/13/2012    Psychiatric disorder       Anxiety    Type 2 diabetes mellitus ([facility]) 9/12/2012

## 2024-01-03 NOTE — ANESTHESIA POSTPROCEDURE EVALUATION
Department of Anesthesiology  Postprocedure Note    Patient: Layne England  MRN: [id number]  YOB: 1955  Date of evaluation: 1/3/2024    Procedure Summary       Date: 01/03/24 Room / Location: [facility] MAIN 06 / [facility] MAIN OR    Anesthesia Start: 1042 Anesthesia Stop: 1340    Procedure: LEFT HIP CONVERSION OF HIP PINNING TO HEMIARTHROPLASTY, POSTEROLATERAL (Left: Hip) Diagnosis:       Other mechanical complication of internal fixation device of left femur, initial encounter ([facility])      (Other mechanical complication of internal fixation device of left femur, initial encounter ([facility]) [T84.195A])    Surgeons: Srini Rojo DO Responsible Provider: Ariel Alvarado MD    Anesthesia Type: General ASA Status: 3            Anesthesia Type: General    Saroj Phase I: Saroj Score: 8    Saroj Phase II:      Anesthesia Post Evaluation    Patient location during evaluation: bedside  Airway patency: patent  Cardiovascular status: hemodynamically stable  Respiratory status: acceptable  Hydration status: stable  Pain management: adequate    No notable events documented.

## 2024-01-04 ENCOUNTER — TELEPHONE (OUTPATIENT)
Age: 69
End: 2024-01-04

## 2024-01-04 ENCOUNTER — HOME CARE VISIT (OUTPATIENT)
Age: 69
End: 2024-01-04
Payer: MEDICARE

## 2024-01-04 VITALS
DIASTOLIC BLOOD PRESSURE: 69 MMHG | OXYGEN SATURATION: 100 % | WEIGHT: 160.05 LBS | BODY MASS INDEX: 32.27 KG/M2 | TEMPERATURE: 97.7 F | RESPIRATION RATE: 18 BRPM | HEIGHT: 59 IN | HEART RATE: 83 BPM | SYSTOLIC BLOOD PRESSURE: 113 MMHG

## 2024-01-04 LAB
ANION GAP SERPL CALC-SCNC: 5 MMOL/L (ref 3–18)
BUN SERPL-MCNC: 13 MG/DL (ref 7–18)
BUN/CREAT SERPL: 20 (ref 12–20)
CALCIUM SERPL-MCNC: 8.7 MG/DL (ref 8.5–10.1)
CHLORIDE SERPL-SCNC: 101 MMOL/L (ref 100–111)
CO2 SERPL-SCNC: 28 MMOL/L (ref 21–32)
CREAT SERPL-MCNC: 0.65 MG/DL (ref 0.6–1.3)
GLUCOSE BLD STRIP.AUTO-MCNC: 235 MG/DL (ref 70–110)
GLUCOSE SERPL-MCNC: 215 MG/DL (ref 74–99)
HCT VFR BLD AUTO: 31.8 % (ref 35–45)
HGB BLD-MCNC: 10.2 G/DL (ref 12–16)
POTASSIUM SERPL-SCNC: 3.4 MMOL/L (ref 3.5–5.5)
SODIUM SERPL-SCNC: 134 MMOL/L (ref 136–145)

## 2024-01-04 PROCEDURE — 82962 GLUCOSE BLOOD TEST: CPT

## 2024-01-04 PROCEDURE — G0378 HOSPITAL OBSERVATION PER HR: HCPCS

## 2024-01-04 PROCEDURE — 36415 COLL VENOUS BLD VENIPUNCTURE: CPT

## 2024-01-04 PROCEDURE — 6370000000 HC RX 637 (ALT 250 FOR IP)

## 2024-01-04 PROCEDURE — 97535 SELF CARE MNGMENT TRAINING: CPT

## 2024-01-04 PROCEDURE — 96374 THER/PROPH/DIAG INJ IV PUSH: CPT

## 2024-01-04 PROCEDURE — 6360000002 HC RX W HCPCS

## 2024-01-04 PROCEDURE — 97530 THERAPEUTIC ACTIVITIES: CPT

## 2024-01-04 PROCEDURE — 96376 TX/PRO/DX INJ SAME DRUG ADON: CPT

## 2024-01-04 PROCEDURE — 2580000003 HC RX 258

## 2024-01-04 PROCEDURE — 85018 HEMOGLOBIN: CPT

## 2024-01-04 PROCEDURE — 80048 BASIC METABOLIC PNL TOTAL CA: CPT

## 2024-01-04 PROCEDURE — 85014 HEMATOCRIT: CPT

## 2024-01-04 PROCEDURE — 96375 TX/PRO/DX INJ NEW DRUG ADDON: CPT

## 2024-01-04 RX ADMIN — ACETAMINOPHEN 1000 MG: 500 TABLET ORAL at 05:37

## 2024-01-04 RX ADMIN — ASPIRIN 81 MG: 81 TABLET, COATED ORAL at 09:03

## 2024-01-04 RX ADMIN — TRAMADOL HYDROCHLORIDE 50 MG: 50 TABLET ORAL at 08:45

## 2024-01-04 RX ADMIN — FAMOTIDINE 20 MG: 20 TABLET, FILM COATED ORAL at 09:03

## 2024-01-04 RX ADMIN — DEXAMETHASONE SODIUM PHOSPHATE 5 MG: 10 INJECTION, SOLUTION INTRAMUSCULAR; INTRAVENOUS at 05:36

## 2024-01-04 RX ADMIN — KETOROLAC TROMETHAMINE 15 MG: 15 INJECTION, SOLUTION INTRAMUSCULAR; INTRAVENOUS at 08:45

## 2024-01-04 RX ADMIN — KETOROLAC TROMETHAMINE 15 MG: 15 INJECTION, SOLUTION INTRAMUSCULAR; INTRAVENOUS at 04:19

## 2024-01-04 RX ADMIN — BISACODYL 5 MG: 5 TABLET, COATED ORAL at 09:03

## 2024-01-04 RX ADMIN — CEFAZOLIN 2000 MG: 1 INJECTION, POWDER, FOR SOLUTION INTRAMUSCULAR; INTRAVENOUS at 03:30

## 2024-01-04 RX ADMIN — TRAMADOL HYDROCHLORIDE 50 MG: 50 TABLET ORAL at 04:09

## 2024-01-04 RX ADMIN — SODIUM CHLORIDE, PRESERVATIVE FREE 10 ML: 5 INJECTION INTRAVENOUS at 09:03

## 2024-01-04 ASSESSMENT — PAIN SCALES - GENERAL
PAINLEVEL_OUTOF10: 0

## 2024-01-04 ASSESSMENT — PAIN DESCRIPTION - ORIENTATION: ORIENTATION: LEFT

## 2024-01-04 ASSESSMENT — PAIN DESCRIPTION - LOCATION: LOCATION: HIP

## 2024-01-04 ASSESSMENT — PAIN - FUNCTIONAL ASSESSMENT: PAIN_FUNCTIONAL_ASSESSMENT: ACTIVITIES ARE NOT PREVENTED

## 2024-01-04 ASSESSMENT — PAIN SCALES - WONG BAKER: WONGBAKER_NUMERICALRESPONSE: 0

## 2024-01-04 NOTE — TELEPHONE ENCOUNTER
Mary Carmen from [facility] Called and would like to know if the patient is still in need of In Home Health Care being that the patient just had surgery on 01/03/24 with Dr. Rojo.        Please call and advise Mary Carmen at  [phone]

## 2024-01-04 NOTE — PROGRESS NOTES
S:   No events  Pain at a 2 at encounter, Pt sitting up and eating breakfast  PT and OT worked with Pt yesterday on transferring to and from her wheelchair, waiting for PT and OT to come by again today  No chest pain, shortness of breath, nausea,vomiting, fever, or chills  Tolerating PO  Voiding spontaneously using the purewick, has yet to transfer to toilet to use urinate  Passing gas    O:   Patient Vitals for the past 8 hrs:   Temp Pulse Resp BP SpO2   01/04/24 0715 97.7 °F (36.5 °C) 83 18 113/69 100 %   01/04/24 0415 98.1 °F (36.7 °C) 92 18 125/78 100 %          Alert, Oriented, NAD, non labored  Operative extremity: LEFT HIP  Dressing clean, dry, intact  Extremity 2+, no swelling. No calf pain.  Unable to test quad firing due to baseline left sided paralysis       A/P:   68 y.o. female post-op day 1 Day Post-Op status post Procedure(s):  LEFT HIP CONVERSION OF HIP PINNING TO HEMIARTHROPLASTY, POSTEROLATERAL   doing well. Postoperative protocol discussed.     - WBAT, OOB as much as tolerated. Posterior hip precautions.  - Abx x 24hours post-op (finish AM of POD#1)  - DVT prophy: TEDs, SCDs, asa  - Pain control: Tylenol, Nsaid (Toradol then oral), Tramadol, Oxycodone  - Ice, elevation operative extremity  - Labs: Acute blood loss anemia, Hgb 10.2, asymptomatic   - PT/OT- ensure she will be safe in the home with the help of her daughter and home health. Pt states that she has an in home nurse to help her as well.   - OT recommends a bedside commode at discharge.  - Dispo: D/C when clears PT and OT and pain controlled.       JAQUELINE Cummins-AMERICA  1/4/2024  8:32 AM

## 2024-01-04 NOTE — HOME CARE
Patient is active with [facility].   HH order noted.  Referral has been noted updated and send to central intake and scheduling by colleague MAURILIO.

## 2024-01-04 NOTE — PROGRESS NOTES
2030 Patient in bed AAOx4,respiration unlabored. Reported no pain at this time,dressing to left hip dry and clean, ice pack reapplied, abductor pillow I place. Neuro vascular , no sensation left side paralysis, pedal pulse present. Patient incontinent of urine,holden care provided , purewick applied

## 2024-01-04 NOTE — PROGRESS NOTES
conducted a post-surgery visit with Layne England, who is a 68 y.o.,female.     The  provided the following Interventions:  Initiated a relationship of care and support. With patient on day one post surgery.She reports to be feeling ok. There is no advance directive present.  Offered prayer and assurance of continued prayers on patient's behalf.     Plan:  Chaplains will continue to follow and will provide pastoral care on an as needed/requested basis.   recommends bedside caregivers page  on duty if patient shows signs of acute spiritual or emotional distress.   Matt Kearney   Board Certified    Spiritual Care   [phone]

## 2024-01-04 NOTE — PLAN OF CARE
with CGA in preparation for ADLs (sup to sit to R side). Pt re-educated on hip precautions and how they relate to ADLs and functional txfrs. Pt verbalized understanding. Pt reports she has reacher for items retrieval at home and her daughter assists with all ADLs. Pt required Mod A for UB/LB ADLs this am to get dressed for going home. Pt performed functional txfr to the recliner (simulating toilet txfr) with Min A and VCs for hands placement. Pt positioned for comfort in recliner, all needs met.    Progression toward goals:  [x]          Improving appropriately and progressing toward goals  []          Improving slowly and progressing toward goals  []          Not making progress toward goals and plan of care will be adjusted     PLAN:  Patient continues to benefit from skilled intervention to address the above impairments.  Continue treatment per established plan of care.    Further Equipment Recommendations for Discharge: pt has DME    AMPA: AM-PAC Inpatient Daily Activity Raw Score: 14    Current research shows that an AM-PAC score of 17 or less is not associated with a discharge to the patient's home setting. Based on an AM-PAC score and their current ADL deficits; it is recommended that the patient have 3-5 sessions per week of Occupational Therapy at d/c to increase the patient's independence.      This AMPAC score should be considered in conjunction with interdisciplinary team recommendations to determine the most appropriate discharge setting. Patient's social support, diagnosis, medical stability, and prior level of function should also be taken into consideration.     SUBJECTIVE:   Patient stated, \"I feel a lot better.\"    OBJECTIVE DATA SUMMARY:   Cognitive/Behavioral Status:  Orientation  Overall Orientation Status: Within Functional Limits  Orientation Level: Oriented X4       Functional Mobility and Transfers for ADLs:   Bed Mobility:  Bed Mobility Training  Bed Mobility Training: Yes  Supine to Sit:  Contact-guard assistance  Scooting: Contact-guard assistance   Transfers:  Transfer Training  Transfer Training: Yes  Sit to Stand: Minimum assistance  Stand to Sit: Minimum assistance  Stand Pivot Transfers: Minimum assistance  Bed to Chair: Contact-guard assistance  Toilet Transfer: Minimum assistance    Balance:  Balance  Sitting: Intact  Sitting - Static: Good (unsupported)  Sitting - Dynamic: Good (unsupported)  Standing: Impaired  Standing - Static: Good  Standing - Dynamic: Fair    ADL Intervention:  Feeding: Minimal assistance   UE Dressing: Moderate assistance  LE Dressing: Moderate assistance     Pain:  Pain level pre-treatment: not rated  Pain level post-treatment: not rated    Activity Tolerance:    Activity Tolerance: Patient tolerated treatment well  Please refer to the flowsheet for vital signs taken during this treatment.  After treatment:   [x]  Patient left in no apparent distress sitting up in chair  []  Patient left in no apparent distress in bed  [x]  Call bell left within reach  [x]  Nursing notified  [x]  Caregiver present  []  Bed alarm activated    COMMUNICATION/EDUCATION:   Patient Education  Education Given To: Patient;Family  Education Provided: Role of Therapy;Precautions;ADL Adaptive Strategies;Transfer Training;Energy Conservation;Fall Prevention Strategies;Family Education;Equipment  Education Method: Demonstration;Verbal;Teach Back  Barriers to Learning: None  Education Outcome: Continued education needed      Thank you for this referral.  Humphrey Johnson OTR/L  Minutes: 39

## 2024-01-04 NOTE — CARE COORDINATION
Call made to [employer], [phone], spoke with Fariba, dispatch will call the nurses station with LAMBERTO.  Trip # [id number].

## 2024-01-04 NOTE — ACP (ADVANCE CARE PLANNING)
Advance Care Planning     Advance Care Planning Inpatient Note  [facility] Department    Today's Date: 1/4/2024  Unit: [facility] 2 SURGICAL    Received request from .  Upon review of chart and communication with care team, patient's decision making abilities are not in question.. Patient was/were present in the room during visit.    Goals of ACP Conversation:  Discuss advance care planning documents    Health Care Decision Makers:     No healthcare decision makers have been documented.  Click here to complete HealthCare Decision Makers including selection of the Healthcare Decision Maker Relationship (ie \"Primary\")  Summary:  None    Advance Care Planning Documents (Patient Wishes):       Assessment:  Patient seen on day one post hip surgery. Patient in fair spirits and little pain she says.  There is no advance directive on file..She  will be discharged today.    Interventions:  Patient DECLINED ACP conversation    Care Preferences Communicated:   No    Outcomes/Plan:      Electronically signed by Vlad Kearney Jr., [facility] on 1/4/2024 at 11:08 AM

## 2024-01-04 NOTE — PROGRESS NOTES
Discharge teaching completed at bedside with patient and daughter(Tia). Opportunity provided for clarifying questions. All answered to their satisfaction. IV removed. ID removed ans shredded. 2 ice packs given to patient. Patient to be transported via [facility].

## 2024-01-04 NOTE — NURSE NAVIGATOR
Rounded on patient s/p LEFT HIP CONVERSION OF HIP PINNING TO HEMIARTHROPLASTY, POSTEROLATERAL with Dr. Rojo, dos 01/03/2023. Patient observed to be alert and oriented x 3, sitting up in bedside chair. She denies chest pain, shortness of breath, nausea or vomiting. She states that pain has been very well controlled throughout the night and is very pleased with the pain relief she has received from the surgery. She has been eating and drinking without difficulty, she has been voiding and passing gas. Dressing to left hip remains clean, dry and intact.  She has been cleared safe for discharge by PT/ OT. She is able to transfer using her right side safely. Home health and home physical therapy ordered as well as case management to arrange for medical transport. Reminded patient and daughter to continue to use ice to assist with pain and swelling , 20 minutes on, not to be placed directly on her skin. Reviewed postoperative showering instructions, reminded patient that dressing is waterproof and that she may shower, no tubs, in two days. She is to contact clinic with any dressing issues. Encouraged continued use of incentive spirometer to keep lungs clear and free from complications. Patient and daughter have no questions or concerns  at this time. Will follow patient postoperatively. Case management to arrange transport, salvador Lopez currently working on it.

## 2024-01-04 NOTE — PLAN OF CARE
Problem: Chronic Conditions and Co-morbidities  Goal: Patient's chronic conditions and co-morbidity symptoms are monitored and maintained or improved  1/3/2024 2202 by Ashley Ac RN  Outcome: Progressing  1/3/2024 1706 by Shanika Flores RN  Outcome: Progressing  1/3/2024 1553 by Shanika Flores RN  Outcome: Progressing     Problem: Safety - Adult  Goal: Free from fall injury  1/3/2024 2202 by Ashley Ac RN  Outcome: Progressing  1/3/2024 1706 by Shanika Flores RN  Outcome: Progressing  1/3/2024 1553 by Shanika Flores RN  Outcome: Progressing     Problem: Pain  Goal: Verbalizes/displays adequate comfort level or baseline comfort level  1/3/2024 2202 by Ashley Ac RN  Outcome: Progressing  1/3/2024 1706 by Shanika Flores RN  Outcome: Progressing  1/3/2024 1553 by Shanika Flores RN  Outcome: Progressing  1/3/2024 1553 by Shanika Flores RN  Outcome: Progressing     Problem: Discharge Planning  Goal: Discharge to home or other facility with appropriate resources  1/3/2024 2202 by Ashley Ac RN  Outcome: Progressing  1/3/2024 1706 by Shanika Flores RN  Outcome: Progressing  1/3/2024 1553 by Shanika Flores RN  Outcome: Progressing  1/3/2024 1553 by Shanika Flores RN  Outcome: Progressing     Problem: Occupational Therapy - Adult  Goal: By Discharge: Performs self-care activities at highest level of function for planned discharge setting.  See evaluation for individualized goals.  Description: Occupational Therapy Goals:  Initiated 1/3/2024 to be met within 7-10 days.    1.  Patient will perform bed mobility for ADLs with supervision/set-up.   2.  Patient will perform seated grooming task at EOB with supervision/set-up with Good balance.  3.  Patient will perform upper body dressing with minimal assistance/contact guard assist.  4.  Patient will perform toilet transfers with minimal assistance/contact guard assist.  5.  Patient will perform all aspects of toileting with  minimal assistance/contact guard assist.  6.  Patient will participate in upper extremity therapeutic exercise/activities with supervision/set-up for 8 minutes to improve endurance and UB strength needed for ADLs    7.  Patient will utilize energy conservation techniques during functional activities with verbal cues.    PLOF: Pt lives alone, pt's daughter or caregiver there most of the day until 4pm assisting with LB dressing, bathing and with most functional transfers. Pt is alone at night.  1/3/2024 1622 by Humphrey Johnson, OTR/L  Outcome: Progressing     Problem: Physical Therapy - Adult  Goal: By Discharge: Performs mobility at highest level of function for planned discharge setting.  See evaluation for individualized goals.  Description: Physical Therapy Goals  Initiated 1/3/2024 and to be accomplished within 7 day(s)  1.  Patient will move from supine to sit and sit to supine  in bed with minimal assistance/contact guard assist.    2.  Patient will transfer from bed to chair and chair to bed with supervision/set-up using the least restrictive device.  3.  Patient will perform sit to stand with supervision/set-up.    PLOF: Lives alone in one story apartment with ramp entry. Daughter and caregivers during day; alone from 4pm to overnight. Transfers to wheelchair. 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 Fall with hip fracture and ORIF Dec 2023; now converted to hemiarthroplasty d/t loosening of hardware.   1/3/2024 1625 by Sherry Garrido, PT  Outcome: Progressing     Problem: ABCDS Injury Assessment  Goal: Absence of physical injury  Outcome: Progressing

## 2024-01-04 NOTE — CARE COORDINATION
Requested Case Management specialist to assist with transportation to     [address] [place] [address] [address] [facility]  [phone]    Patient will require BLS transport.     Pt requires Stretcher If stretcher, reason: Bed bound    Patient is currently requiring oxygen: No    H: 4'11, W 160LB.    Pt is on isolation: No      Is the pt ready now? Yes    Requested time: Now    PCS Faxed: Yes  Insurance verified on face sheet: Yes    Auth needed for transport: Yes'    CM completed PCS/ Envelope and placed on chart.

## 2024-01-04 NOTE — PLAN OF CARE
Problem: Physical Therapy - Adult  Goal: By Discharge: Performs mobility at highest level of function for planned discharge setting.  See evaluation for individualized goals.  Description: Physical Therapy Goals  Initiated 1/3/2024 and to be accomplished within 7 day(s)  1.  Patient will move from supine to sit and sit to supine  in bed with minimal assistance/contact guard assist.    2.  Patient will transfer from bed to chair and chair to bed with supervision/set-up using the least restrictive device.  3.  Patient will perform sit to stand with supervision/set-up.    PLOF: Lives alone in one story apartment with ramp entry. Daughter and caregivers during day; alone from 4pm to overnight. Transfers to wheelchair. 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 Fall with hip fracture and ORIF Dec 2023; now converted to hemiarthroplasty d/t loosening of hardware.   Outcome: Progressing     PHYSICAL THERAPY TREATMENT    Patient: Layne England (68 y.o. female)  Date: 1/4/2024  Diagnosis: Other mechanical complication of internal fixation device of left femur, initial encounter ([facility]) [T84.195A]  Status post hip hemiarthroplasty [Z96.649] Status post hip hemiarthroplasty  Procedure(s) (LRB):  LEFT HIP CONVERSION OF HIP PINNING TO HEMIARTHROPLASTY, POSTEROLATERAL (Left) 1 Day Post-Op  Precautions:Fall Risk, Surgical Protocols, Weight Bearing,  , Left Lower Extremity Weight Bearing: Weight Bearing As ToleratedHip Precautions: Posterior hip precautions    ASSESSMENT:  Pt received up in recliner and agreeable. Reviewed hip precautions. Does not rate pain but is medicated by nursing at start of session. Pt is able to stand from recliner with SBA and performs SPT recliner <> wc to the R with CGA for safety. No LOB noted and pt safely manages hand placement. Pt is safe for discharge home and has all needed DME. Will sign off. Nursing updated.     Progression toward goals:   [x]      Improving appropriately and progressing

## 2024-01-04 NOTE — PROGRESS NOTES
Patient  completed PT/OT transferring on right side to chair and wheelchair. Patient has voided and is eating 100 % meals. Up in chair and is stable and daughter in room. Call bell in reach, and pending   transportion arrived

## 2024-01-05 ENCOUNTER — TELEPHONE (OUTPATIENT)
Facility: HOSPITAL | Age: 69
End: 2024-01-05

## 2024-01-05 NOTE — TELEPHONE ENCOUNTER
Call placed to patient, ID verified x 2. Patient is s/p LEFT HIP CONVERSION OF HIP PINNING TO HEMIARTHROPLASTY, POSTEROLATERAL with Dr. Rojo, dos 01/03/ 2024. She denies chest pain, shortness of breath, nausea, vomiting , fever or chills. She states that she has very little pain and is so pleased that pain is so much better than prior to surgery. Her dressing is described as clean, dry and intact. Per patient physical therapy has not reached out to her as of yet. Patient informed that they should be calling her sometime today to set up a time to come out. Overall she feels she is doing very well and she is so thankful for the care she received. She has no questions or concerns at this time. She will follow up with Dr. Rojo in two weeks or sooner if needed.

## 2024-01-06 ENCOUNTER — HOME CARE VISIT (OUTPATIENT)
Age: 69
End: 2024-01-06
Payer: MEDICARE

## 2024-01-06 VITALS
HEART RATE: 78 BPM | OXYGEN SATURATION: 97 % | DIASTOLIC BLOOD PRESSURE: 70 MMHG | SYSTOLIC BLOOD PRESSURE: 120 MMHG | TEMPERATURE: 97.8 F | RESPIRATION RATE: 18 BRPM

## 2024-01-06 PROCEDURE — G0299 HHS/HOSPICE OF RN EA 15 MIN: HCPCS

## 2024-01-06 PROCEDURE — G0151 HHCP-SERV OF PT,EA 15 MIN: HCPCS

## 2024-01-06 ASSESSMENT — ENCOUNTER SYMPTOMS
PAIN LOCATION - PAIN QUALITY: DULL ACHE
PAIN LOCATION - PAIN QUALITY: ACHING
HEMOPTYSIS: 0

## 2024-01-07 ENCOUNTER — HOME CARE VISIT (OUTPATIENT)
Age: 69
End: 2024-01-07
Payer: MEDICARE

## 2024-01-07 VITALS
RESPIRATION RATE: 18 BRPM | TEMPERATURE: 97 F | OXYGEN SATURATION: 95 % | SYSTOLIC BLOOD PRESSURE: 140 MMHG | HEART RATE: 78 BPM | DIASTOLIC BLOOD PRESSURE: 87 MMHG

## 2024-01-07 PROBLEM — Z98.890 POST-OPERATIVE STATE: Status: RESOLVED | Noted: 2023-12-08 | Resolved: 2024-01-07

## 2024-01-07 PROCEDURE — G0157 HHC PT ASSISTANT EA 15: HCPCS

## 2024-01-07 ASSESSMENT — ENCOUNTER SYMPTOMS
NAUSEA: 1
DYSPNEA ACTIVITY LEVEL: AFTER AMBULATING 10 - 20 FT
VOMITING: 1

## 2024-01-08 VITALS
OXYGEN SATURATION: 98 % | SYSTOLIC BLOOD PRESSURE: 126 MMHG | RESPIRATION RATE: 15 BRPM | HEART RATE: 100 BPM | TEMPERATURE: 98 F | DIASTOLIC BLOOD PRESSURE: 72 MMHG

## 2024-01-08 ASSESSMENT — ENCOUNTER SYMPTOMS: PAIN LOCATION - PAIN QUALITY: SORENESS, TIGHTNESS

## 2024-01-09 ENCOUNTER — HOME CARE VISIT (OUTPATIENT)
Age: 69
End: 2024-01-09
Payer: MEDICARE

## 2024-01-09 PROCEDURE — G0300 HHS/HOSPICE OF LPN EA 15 MIN: HCPCS

## 2024-01-10 ENCOUNTER — HOME CARE VISIT (OUTPATIENT)
Age: 69
End: 2024-01-10
Payer: MEDICARE

## 2024-01-10 ENCOUNTER — TELEPHONE (OUTPATIENT)
Age: 69
End: 2024-01-10

## 2024-01-10 VITALS
TEMPERATURE: 97.5 F | SYSTOLIC BLOOD PRESSURE: 128 MMHG | HEART RATE: 78 BPM | RESPIRATION RATE: 17 BRPM | OXYGEN SATURATION: 97 % | DIASTOLIC BLOOD PRESSURE: 72 MMHG

## 2024-01-10 PROCEDURE — G0157 HHC PT ASSISTANT EA 15: HCPCS

## 2024-01-10 ASSESSMENT — ENCOUNTER SYMPTOMS: STOOL DESCRIPTION: SOFT FORMED

## 2024-01-10 NOTE — TELEPHONE ENCOUNTER
Patients daughter, Taylor, called to inquire when patient is to be scheduled for her first post op after 1/4/24 left hip surgery.  Please advise them back at [phone].

## 2024-01-11 ENCOUNTER — HOME CARE VISIT (OUTPATIENT)
Age: 69
End: 2024-01-11
Payer: MEDICARE

## 2024-01-11 VITALS
HEART RATE: 94 BPM | SYSTOLIC BLOOD PRESSURE: 114 MMHG | OXYGEN SATURATION: 98 % | TEMPERATURE: 97.8 F | DIASTOLIC BLOOD PRESSURE: 70 MMHG | RESPIRATION RATE: 17 BRPM

## 2024-01-11 NOTE — CASE COMMUNICATION
Good Morning,    DC plans discussed in Team Meeting. PT Discipline DC on 1/19 with Jennifer, and Final DC w/ SN - Aniyah Coburn on 1/23. I will provide the NOMNC on my first visit next week.     Thank you,  JARRELL Machado

## 2024-01-11 NOTE — HOME HEALTH
the wound; dramatic change in color or size of wound; increase in pain at wound site in spite of interventions.    SN advised pt/CG during shower/bath time to assess pts skin for evidence of pressure injury such as: non-blanchable erythema, blistering, discoloration, alteration of oral mucosa: signs of mucositis, yeast infection, or breaks in membrane, edema, rash and skin tears. Report location, size, and severity of new or worsening findings to physician.    Agency Progress toward goals: progressing     Patient's Progress towards personal goals: progressing    Pt acknowledged understanding of all education provided, some written information also provided for pt to keep and review.    Patient's Progress towards personal goals: when patient reaches goals and medication is managed, disease processes are understood patient agrees and understand that discharge will take place.

## 2024-01-11 NOTE — HOME HEALTH
SUBJECTIVE: \"I feel nauseated all the time. I feel better since I had a bowel movement. It had been over a week.  I can't keep anything down. I don't like taking all these meds.\" Dtr states the nurse had to watch her keep the medication in her mouth last time, because she will spit it out.\"     CAREGIVER INVOLVEMENT/ASSISTANCE NEEDED FOR: Pt's dtr present today assisting w/ meals, medications, ADLs/IADLs, and mobility. [facility] provides transportation to MD appts via medical transport.   .  OBJECTIVE:  See interventions.    PATIENT EDUCATION PROVIDED THIS VISIT: Discussed getting a leg  (showed dtr picture on [employer]) with multi-level  that's operable with 1 hand. Fall Prevention, pressure injury prevention. (see interventions). Eat light meals (soup, crackers, ginger ale, water) to improve energy, and manage blood glucose levels.     PATIENT RESPONSE TO EDUCATION PROVIDED: Dtr verbalized understanding.     PATIENT RESPONSE TO TREATMENT:   Pt demonstrates mod (I) stand/step transfers from bed <> w/c, and w/c <> toilet w/ SBA. She requires assistance to doff/don pants and occasional perianal cleaning d/t requiring 1 UE support (RUE) and no use of LLE d/t flaccidity, and previous CVA. Pt demo good understanding of supine there ex today, and reported no increased pain or discomfort in R hip.   .  PATIENT PROGRESS TOWARDS GOALS:   Pt presents sitting on the edge of the bed reporting increased nausea, which improved after taking Ondansetron medication and returning to supine after using restroom. Pt is now back to having regular bowel movements, and reported improved appetite asking for soup during visit. Note poor muscle facilitation on L non-dominant side, as pt is unable to safely weight bear to complete standing activities (I), thus requires use of an AD for balance, and power wheelchair for mobility throughout the home. Pt does have a PCA that assist w/ ADLs, cooking, cleaning needs, etc when dtr, is

## 2024-01-12 ENCOUNTER — HOME CARE VISIT (OUTPATIENT)
Age: 69
End: 2024-01-12
Payer: MEDICARE

## 2024-01-12 PROCEDURE — G0157 HHC PT ASSISTANT EA 15: HCPCS

## 2024-01-12 PROCEDURE — G0299 HHS/HOSPICE OF RN EA 15 MIN: HCPCS

## 2024-01-13 VITALS
RESPIRATION RATE: 17 BRPM | DIASTOLIC BLOOD PRESSURE: 78 MMHG | OXYGEN SATURATION: 97 % | HEART RATE: 98 BPM | SYSTOLIC BLOOD PRESSURE: 124 MMHG | TEMPERATURE: 97.4 F

## 2024-01-15 ENCOUNTER — HOME CARE VISIT (OUTPATIENT)
Age: 69
End: 2024-01-15
Payer: MEDICARE

## 2024-01-15 VITALS
RESPIRATION RATE: 16 BRPM | HEART RATE: 97 BPM | OXYGEN SATURATION: 96 % | DIASTOLIC BLOOD PRESSURE: 80 MMHG | SYSTOLIC BLOOD PRESSURE: 127 MMHG | TEMPERATURE: 98 F

## 2024-01-15 PROCEDURE — G0157 HHC PT ASSISTANT EA 15: HCPCS

## 2024-01-16 ENCOUNTER — OFFICE VISIT (OUTPATIENT)
Age: 69
End: 2024-01-16
Payer: MEDICARE

## 2024-01-16 VITALS
HEART RATE: 99 BPM | SYSTOLIC BLOOD PRESSURE: 112 MMHG | OXYGEN SATURATION: 96 % | RESPIRATION RATE: 17 BRPM | TEMPERATURE: 98.3 F | DIASTOLIC BLOOD PRESSURE: 62 MMHG

## 2024-01-16 DIAGNOSIS — T81.89XA PROBLEM INVOLVING SURGICAL INCISION: ICD-10-CM

## 2024-01-16 DIAGNOSIS — Z98.890 S/P ORIF (OPEN REDUCTION INTERNAL FIXATION) FRACTURE: ICD-10-CM

## 2024-01-16 DIAGNOSIS — Z87.81 S/P ORIF (OPEN REDUCTION INTERNAL FIXATION) FRACTURE: ICD-10-CM

## 2024-01-16 DIAGNOSIS — Z96.649 STATUS POST HIP HEMIARTHROPLASTY: Primary | ICD-10-CM

## 2024-01-16 DIAGNOSIS — Z86.73 HISTORY OF CVA (CEREBROVASCULAR ACCIDENT) WITHOUT RESIDUAL DEFICITS: ICD-10-CM

## 2024-01-16 DIAGNOSIS — S72.002A CLOSED DISPLACED FRACTURE OF LEFT FEMORAL NECK (HCC): ICD-10-CM

## 2024-01-16 PROCEDURE — 73502 X-RAY EXAM HIP UNI 2-3 VIEWS: CPT

## 2024-01-16 PROCEDURE — 99024 POSTOP FOLLOW-UP VISIT: CPT

## 2024-01-16 RX ORDER — CEPHALEXIN 500 MG/1
500 CAPSULE ORAL 4 TIMES DAILY
Qty: 20 CAPSULE | Refills: 0 | Status: SHIPPED | OUTPATIENT
Start: 2024-01-16 | End: 2024-01-21

## 2024-01-16 NOTE — HOME HEALTH
Skilled reason for visit: WOUND CARE  AND ASSESSMENT OF LT HIP SURGICAL SITE.  Caregiver involvement:COOKS, CLEANS AND TAKES PATIENT TO MD APPT.    Medications reviewed and all medications are available in the home this visit.    The following education was provided regarding medications:  INSTRUCTED ON IMPORTANCE OF MEDICATION COMPLIANCY..    MD notified of any discrepancies/look a-like medications/medication interactions: NA  Medications are IN HOME AND EFFECTIVE  at this time.      Home health supplies by type and quantity ordered/delivered this visit include: NA    Patient education provided this visit: SURGICAL SITE ASSESSED, PATIENT REFUSED TO HAVE [place] DSG APPLIED, STATES IT ROLLS UP WHEN PATIENT TURNS FROM SIDE TO SIDE ,SEE NSG INTERVENTIONS.    Sharps education provided: YES    Patient level of understanding of education provided:GOOD, VERBALIZED UNDERSTANDING.    Skilled Care Performed this visit:   REVIEWED MEDICATIONS WITH PATIENT. INSTRUCTED ON S/S OF INFECTION AT SURGICAL SITE. OLD CVA, PATIENT SEEMED  RESTLESS FROM SITTING IN BED. SURGICAL SITE DRY AND INTACT, NO S/S OF INFECTION.  Patient response to procedure performed:  GOOD.      Patient's Progress towards personal goals: PROGRESSING.    Home exercise program: DEEP BREATHING EXERCISES TO HELP PREVENT PNEUMONIA.    Continued need for the following skills: Nursing.    Plan for next visit: WOUND CARE AND TEACHING     Patient and/or caregiver notified and agrees to changes in the Plan of Care: Yes.     The following discharge planning was discussed with the pt/caregiver: PATIENT WILL BE DISCHARGED WHEN GOALS ARE MET AND PATIENT IS STABLE.

## 2024-01-16 NOTE — PROGRESS NOTES
Patient: Layne England                MRN: [id number]       SSN: [id number]  YOB: 1955        AGE: 68 y.o.        SEX: female  BMI: There is no height or weight on file to calculate BMI.    PCP: Joyce Llanes,   01/16/24    Chief Complaint: Post-Op Check and Hip Pain (LEFT HIP CONVERSION OF HIP PINNING TO HEMIARTHROPLASTY, POSTEROLATERAL//DOS 01/03/2024)      1. Status post hip hemiarthroplasty  Comments:  left  Orders:  -     cephALEXin (KEFLEX) 500 MG capsule; Take 1 capsule by mouth 4 times daily for 5 days, Disp-20 capsule, R-0Normal  2. Closed displaced fracture of left femoral neck (HCC)  -     AMB POC X-RAY RADEX HIP UNI WITH PELVIS 2-3 VIEWS  3. S/P ORIF (open reduction internal fixation) fracture  -     AMB POC X-RAY RADEX HIP UNI WITH PELVIS 2-3 VIEWS  4. Problem involving surgical incision  -     cephALEXin (KEFLEX) 500 MG capsule; Take 1 capsule by mouth 4 times daily for 5 days, Disp-20 capsule, R-0Normal  5. History of CVA (cerebrovascular accident) without residual deficits  Comments:  hemiparalysis, left        HPI:  Layne England is a 68 y.o. female with chief complaint of   Chief Complaint   Patient presents with    Post-Op Check    Hip Pain     LEFT HIP CONVERSION OF HIP PINNING TO HEMIARTHROPLASTY, POSTEROLATERAL    DOS 01/03/2024 01/03/2024: left hip conversion of hip pinning to hemiarthroplasty with a posterolateral approach (Dr. Rojo)    12/06/2023: left hip closed reduction with percutaneous screws for closed displaced fracture of the left femoral neck. (Dr. Ellis)      69yo female with a PMH of left sided paralysis 2/2 CVA (10 years ago), HTN, and DM. She was originally seen by Dr. Ellis and he did a left hip closed reduction with percutaneous screws for closed displaced fracture of left femoral neck on 12/06/23. She was then referred to Dr. Rojo after the screws began to backout with separation of the femoral shaft from the head. Due to this,

## 2024-01-16 NOTE — HOME HEALTH
SUBJECTIVE: Pt/dtr reported her left hip incision started leaking this morning. \"I've lost my taste for food. I haven't been able to swallow good since I had the stroke, and I can't chew. (pt does not have dentures). I've been living off of [facility]. I had some grapes and honeydew melon.\"      CAREGIVER INVOLVEMENT/ASSISTANCE NEEDED FOR: Pt's dtr present today assisting w/ meals, medications, ADLs/IADLs, and mobility. [facility] provides transportation to MD appts via medical transport.     MD appt: January 16, 2024 1:30 PM for Ortho follow up      OBJECTIVE:  See interventions.      PATIENT EDUCATION PROVIDED THIS VISIT: Recommended sitting up in the wheelchair during the day to increase sitting tolerance. Do not fall asleep in wheelchair - return to bed to prevent falls/fall prevention (see interventions). HEP compliance/completion. Recommended SLP if still having difficulty swallowing      PATIENT RESPONSE TO EDUCATION PROVIDED: Dtr verbalized understanding. Pt reported she straps herself in the wheelchair during the day with the seat belt. Pt open to speech therapy and would think about it.       PATIENT RESPONSE TO TREATMENT:  Note increased LE fatigue w/ supine LE there ex review, however unable to reposition scooting from R> L. Pt attempts and increases risk of pulling skin on L hip risking friction injury/incision dehiscence (no dressing donned - wound care being completed by  SN staff.  She is (I) w/ managing wheelchair position and displaying safe transfer mobility. Note poor L LE/UE muscle recruitment to reposition in bed for pressure injury relief. Pt is able to scoot L >R to access the edge of bed needed in preparation for transfers.       PATIENT PROGRESS TOWARDS GOALS:   Pt progressed well towards established goals in POC. She is (I) transferring from bed <> wheelchair, and toilet in apartment. She currenty relies on medical transport with w/c lift. Pt continues to have deficits w/ LUE/LE muscle

## 2024-01-17 ENCOUNTER — HOME CARE VISIT (OUTPATIENT)
Age: 69
End: 2024-01-17
Payer: MEDICARE

## 2024-01-17 PROCEDURE — G0299 HHS/HOSPICE OF RN EA 15 MIN: HCPCS

## 2024-01-19 ENCOUNTER — HOME CARE VISIT (OUTPATIENT)
Age: 69
End: 2024-01-19
Payer: MEDICARE

## 2024-01-19 VITALS
SYSTOLIC BLOOD PRESSURE: 132 MMHG | OXYGEN SATURATION: 94 % | TEMPERATURE: 97.6 F | RESPIRATION RATE: 16 BRPM | HEART RATE: 84 BPM | DIASTOLIC BLOOD PRESSURE: 80 MMHG

## 2024-01-19 PROCEDURE — G0151 HHCP-SERV OF PT,EA 15 MIN: HCPCS

## 2024-01-20 NOTE — HOME HEALTH
SUBJECTIVE pleased with PT and progress acheived  CAREGIVER ASSISTANCE:daughter will be able to assist as needed   MEDICATIONS REVIEWED AND UPDATED: no changes in medications at this time  WOUND intact dressing on L hip   ROM wfl  BED MOBILITY independent with supine to sit   TRANSFERS SBA with bed to pwer chair   GAIT TRAINING n/a independent with power w/c use   STAIRS n/a  THERAPEUTIC EXERCISES independent  PATIENT/CAREGIVER EDUCATION THIS VISIT:  patient provided with skilled PT intervention consisting of graded therapeutic exercises, gait training, balance training, safe transfers training, caregiver education and Home exercise program education. Patient at time of discharge was able to demonstrate independence with bed mobility and SBA with transfers. Patient is independent with power w/c use  PLAN DC at this time due max potential achieved   DISCHARGE PLANNING DISCUSSED: dc to self and family under MD supervision

## 2024-01-23 ENCOUNTER — OFFICE VISIT (OUTPATIENT)
Age: 69
End: 2024-01-23

## 2024-01-23 VITALS — BODY MASS INDEX: 32.25 KG/M2 | WEIGHT: 160 LBS | HEIGHT: 59 IN

## 2024-01-23 DIAGNOSIS — Z47.89 ORTHOPEDIC AFTERCARE: ICD-10-CM

## 2024-01-23 DIAGNOSIS — T81.89XA PROBLEM INVOLVING SURGICAL INCISION: ICD-10-CM

## 2024-01-23 DIAGNOSIS — Z86.73 HISTORY OF CVA (CEREBROVASCULAR ACCIDENT) WITHOUT RESIDUAL DEFICITS: ICD-10-CM

## 2024-01-23 DIAGNOSIS — Z96.649 STATUS POST HIP HEMIARTHROPLASTY: Primary | ICD-10-CM

## 2024-01-23 PROCEDURE — 99024 POSTOP FOLLOW-UP VISIT: CPT

## 2024-01-23 RX ORDER — CEPHALEXIN 500 MG/1
500 CAPSULE ORAL 4 TIMES DAILY
Qty: 20 CAPSULE | Refills: 0 | Status: SHIPPED | OUTPATIENT
Start: 2024-01-23 | End: 2024-01-28

## 2024-01-23 NOTE — PROGRESS NOTES
times daily (before meals)      metFORMIN (GLUCOPHAGE) 500 MG tablet Take 1 tablet by mouth daily (with breakfast)      hydroCHLOROthiazide (HYDRODIURIL) 25 MG tablet Take 1 tablet by mouth daily      lisinopril (PRINIVIL;ZESTRIL) 40 MG tablet Take 1 tablet by mouth daily      potassium chloride (KLOR-CON) 10 MEQ extended release tablet Take 1 tablet by mouth daily      amLODIPine (NORVASC) 5 MG tablet Take 2 tablets by mouth daily      hydrocortisone 1 % lotion Apply topically 2 times daily      losartan-hydroCHLOROthiazide (HYZAAR) 100-25 MG per tablet TAKE ONE TABLET EVERY DAY      metoprolol succinate (TOPROL XL) 100 MG extended release tablet Take 1 tablet by mouth in the morning and 1 tablet in the evening.      mometasone (NASONEX) 50 MCG/ACT nasal spray 2 sprays by Nasal route daily      mupirocin (BACTROBAN) 2 % cream Apply topically 2 times daily      rosuvastatin (CRESTOR) 10 MG tablet Take 1 tablet by mouth daily      tolterodine (DETROL) 2 MG tablet TAKE ONE TABLET TWO TIMES A DAY      albuterol sulfate HFA (VENTOLIN HFA) 108 (90 Base) MCG/ACT inhaler ONH 2 PUFFS EVERY 4 HOURS AS NEEDED FOR WHEEZING       No current facility-administered medications for this visit.        Allergies   Allergen Reactions    Other Other (See Comments)     Seafood - intolerance    Penicillins Diarrhea and Nausea And Vomiting       Past Surgical History:   Procedure Laterality Date    COLONOSCOPY      DILATION AND CURETTAGE OF UTERUS      HIP SURGERY Left 12/06/2023    LEFT HIP CLOSED REDUCTION WITH PERCUTANEOUS SCREWS performed by Peterson Ellis DO at [facility] MAIN OR    HIP SURGERY Left 1/3/2024    LEFT HIP CONVERSION OF HIP PINNING TO HEMIARTHROPLASTY, POSTEROLATERAL performed by Srini Rojo DO at [facility] MAIN OR       Social History     Socioeconomic History    Marital status: Single     Spouse name: Not on file    Number of children: Not on file    Years of education: Not on file    Highest education level: Not on

## 2024-01-25 ENCOUNTER — HOME CARE VISIT (OUTPATIENT)
Age: 69
End: 2024-01-25
Payer: MEDICARE

## 2024-01-25 VITALS — OXYGEN SATURATION: 97 % | RESPIRATION RATE: 17 BRPM

## 2024-01-25 PROCEDURE — G0300 HHS/HOSPICE OF LPN EA 15 MIN: HCPCS

## 2024-01-30 VITALS
SYSTOLIC BLOOD PRESSURE: 132 MMHG | RESPIRATION RATE: 17 BRPM | TEMPERATURE: 97.6 F | DIASTOLIC BLOOD PRESSURE: 74 MMHG | HEART RATE: 87 BPM | OXYGEN SATURATION: 97 %

## 2024-01-30 ASSESSMENT — ENCOUNTER SYMPTOMS: STOOL DESCRIPTION: SOFT FORMED

## 2024-01-30 NOTE — HOME HEALTH
Skilled reason for visit: Reassessment    Caregiver involvement: Pt resides in a single story apartment in a [facility] community, pt uses a  w/c for mobility and requires asst with ADL's, medications, and pts daughter is her primary CG and assts her with transportation to [facility] and is available to asst pt as needed.    This visit: Pt is A&Ox3, able to communicate her needs, denies pain or discomfort at this time, pts surgical incision to her left lateral hip incision looks well,  incision stable, well approximated, dry, no drainage, no redness, swelling or s/s of infxn. Pt has met the goals established for her for this certification period, pt states she is ready for discharge.    Home health supplies by type and quantity ordered/delivered this visit include: N/A    Medications reviewed and all medications are available in the home this visit.    MD notified of any discrepancies/look a-like medications/medication interactions: NA    Medications are effective at this time.     Patient education provided this visit:SEE INTERVENTIONS LIST    Pt advised not to start, stop or change the way she is currently taking any of her medications without first consulting with the MD.    SN instructed pt to promptly report any redness, swelling, warmth, fever, chills, increased heart/respiratory rate, uncontrolled pain/tenderness, drainage: green/yellow/brown, or odor to MD immediately. No s/s of infection noted this visit no odor or foul drainage noted.       SN instructed pt to notify home health or physician for the following wound healing complications: increased drainage, pus or a foul odor coming from the wound, fever, muscle, joint, or body aches, sweating, increased swelling, redness or red streaks surrounding the wound; dramatic change in color or size of wound; increase in pain at wound site in spite of interventions.    SN advised pt during shower/bath time to assess skin for evidence of pressure injury such as:

## 2024-02-20 ENCOUNTER — OFFICE VISIT (OUTPATIENT)
Age: 69
End: 2024-02-20

## 2024-02-20 DIAGNOSIS — Z98.890 S/P ORIF (OPEN REDUCTION INTERNAL FIXATION) FRACTURE: ICD-10-CM

## 2024-02-20 DIAGNOSIS — Z87.81 S/P ORIF (OPEN REDUCTION INTERNAL FIXATION) FRACTURE: ICD-10-CM

## 2024-02-20 DIAGNOSIS — Z96.649 STATUS POST HIP HEMIARTHROPLASTY: Primary | ICD-10-CM

## 2024-02-20 DIAGNOSIS — Z47.89 ORTHOPEDIC AFTERCARE: ICD-10-CM

## 2024-02-20 DIAGNOSIS — Z86.73 HISTORY OF CVA (CEREBROVASCULAR ACCIDENT) WITHOUT RESIDUAL DEFICITS: ICD-10-CM

## 2024-02-20 PROCEDURE — 99024 POSTOP FOLLOW-UP VISIT: CPT

## 2024-02-20 NOTE — PROGRESS NOTES
Patient: Layne England                MRN: [id number]       SSN: [id number]  YOB: 1955        AGE: 68 y.o.        SEX: female  BMI: There is no height or weight on file to calculate BMI.    PCP: Joyce Llanes DO  02/20/24    Chief Complaint: Post-Op Check (LEFT HIP CONVERSION OF HIP PINNING TO HEMIARTHROPLASTY, POSTEROLATERAL//DOS 01/03/2024)      1. Status post hip hemiarthroplasty  2. History of CVA (cerebrovascular accident) without residual deficits  3. S/P ORIF (open reduction internal fixation) fracture  4. Orthopedic aftercare          HPI:  Layne England is a 68 y.o. female with chief complaint of   Chief Complaint   Patient presents with    Post-Op Check     LEFT HIP CONVERSION OF HIP PINNING TO HEMIARTHROPLASTY, POSTEROLATERAL    DOS 01/03/2024 01/03/2024: left hip conversion of hip pinning to hemiarthroplasty with a posterolateral approach (Dr. Rojo)    12/06/2023: left hip closed reduction with percutaneous screws for closed displaced fracture of the left femoral neck. (Dr. Ellis)      69yo female with a PMH of left sided paralysis 2/2 CVA (10 years ago), HTN, and DM. She was originally seen by Dr. Ellis and he did a left hip closed reduction with percutaneous screws for closed displaced fracture of left femoral neck on 12/06/23. She was then referred to Dr. Rojo after the screws began to backout with separation of the femoral shaft from the head. Due to this, Dr. Rojo performed a left hip conversion of hip pinning to hemiarthroplasty with a posterolateral approach on 01/03/24.    IMAGING:  Imaging read by myself and interpreted as follows:    01/16/2024:  3 view x-ray of the left hip including AP pelvis, AP and lateral demonstrates a well positioned left hemiarthroplasty with cemented components without evidence of loosening or fracture when compared to the post operative films take at [facility] on 01/03/23. Poor bone mineralization evident on films consistent

## 2024-02-26 ENCOUNTER — HOME CARE VISIT (OUTPATIENT)
Age: 69
End: 2024-02-26

## 2024-02-27 NOTE — HOME HEALTH
non billable [facility] dc. [facility] was answered in reference to 1/19/24 dc visit   SUBJECTIVE pleased with PT and progress acheived    CAREGIVER ASSISTANCE:daughter will be able to assist as needed     MEDICATIONS REVIEWED AND UPDATED: no changes in medications at this time    WOUND intact dressing on L hip     ROM wfl    BED MOBILITY independent with supine to sit     TRANSFERS SBA with bed to pwer chair     GAIT TRAINING n/a independent with power w/c use     STAIRS n/a    THERAPEUTIC EXERCISES independent    PATIENT/CAREGIVER EDUCATION THIS VISIT:  patient provided with skilled PT intervention consisting of graded therapeutic exercises, gait training, balance training, safe transfers training, caregiver education and Home exercise program education. Patient at time of discharge was able to demonstrate independence with bed mobility and SBA with transfers. Patient is independent with power w/c use    PLAN DC at this time due max potential achieved     DISCHARGE PLANNING DISCUSSED: dc to self and family under MD supervision

## 2024-03-19 ENCOUNTER — HOSPITAL ENCOUNTER (OUTPATIENT)
Facility: HOSPITAL | Age: 69
Discharge: HOME | End: 2024-03-22
Attending: PHYSICIAN ASSISTANT
Payer: MEDICARE

## 2024-03-19 DIAGNOSIS — Z12.31 VISIT FOR SCREENING MAMMOGRAM: ICD-10-CM

## 2024-03-19 PROCEDURE — 77067 SCR MAMMO BI INCL CAD: CPT

## 2024-03-19 PROCEDURE — 77063 BREAST TOMOSYNTHESIS BI: CPT

## 2024-04-11 ENCOUNTER — TRANSCRIBE ORDERS (OUTPATIENT)
Facility: HOSPITAL | Age: 69
End: 2024-04-11

## 2024-04-11 DIAGNOSIS — Z78.0 POST-MENOPAUSAL: Primary | ICD-10-CM

## 2024-04-16 ENCOUNTER — OFFICE VISIT (OUTPATIENT)
Age: 69
End: 2024-04-16
Payer: MEDICARE

## 2024-04-16 DIAGNOSIS — Z86.73 HISTORY OF CVA (CEREBROVASCULAR ACCIDENT) WITHOUT RESIDUAL DEFICITS: ICD-10-CM

## 2024-04-16 DIAGNOSIS — Z87.81 S/P ORIF (OPEN REDUCTION INTERNAL FIXATION) FRACTURE: ICD-10-CM

## 2024-04-16 DIAGNOSIS — Z96.649 STATUS POST HIP HEMIARTHROPLASTY: Primary | ICD-10-CM

## 2024-04-16 DIAGNOSIS — Z98.890 S/P ORIF (OPEN REDUCTION INTERNAL FIXATION) FRACTURE: ICD-10-CM

## 2024-04-16 PROCEDURE — 3017F COLORECTAL CA SCREEN DOC REV: CPT | Performed by: ORTHOPAEDIC SURGERY

## 2024-04-16 PROCEDURE — G8400 PT W/DXA NO RESULTS DOC: HCPCS | Performed by: ORTHOPAEDIC SURGERY

## 2024-04-16 PROCEDURE — 1123F ACP DISCUSS/DSCN MKR DOCD: CPT | Performed by: ORTHOPAEDIC SURGERY

## 2024-04-16 PROCEDURE — G8417 CALC BMI ABV UP PARAM F/U: HCPCS | Performed by: ORTHOPAEDIC SURGERY

## 2024-04-16 PROCEDURE — 1090F PRES/ABSN URINE INCON ASSESS: CPT | Performed by: ORTHOPAEDIC SURGERY

## 2024-04-16 PROCEDURE — G8428 CUR MEDS NOT DOCUMENT: HCPCS | Performed by: ORTHOPAEDIC SURGERY

## 2024-04-16 PROCEDURE — 1036F TOBACCO NON-USER: CPT | Performed by: ORTHOPAEDIC SURGERY

## 2024-04-16 PROCEDURE — 99213 OFFICE O/P EST LOW 20 MIN: CPT | Performed by: ORTHOPAEDIC SURGERY

## 2024-04-16 NOTE — PROGRESS NOTES
Patient: Layne England                MRN: [id number]       SSN: [id number]  YOB: 1955        AGE: 68 y.o.        SEX: female  BMI: There is no height or weight on file to calculate BMI.    PCP: Fermín Stewart DO  04/16/24    Chief Complaint: Hip Pain      1. Status post hip hemiarthroplasty  2. History of CVA (cerebrovascular accident) without residual deficits  3. S/P ORIF (open reduction internal fixation) fracture          HPI:  Layne England is a 68 y.o. female with chief complaint of   Chief Complaint   Patient presents with    Hip Pain     01/03/2024: left hip conversion of hip pinning to hemiarthroplasty with a posterolateral approach (Dr. Rojo)    12/06/2023: left hip closed reduction with percutaneous screws for closed displaced fracture of the left femoral neck. (Dr. Ellis)      69yo female with a PMH of left sided paralysis 2/2 CVA (10 years ago), HTN, and DM. She was originally seen by Dr. Ellis and he did a left hip closed reduction with percutaneous screws for closed displaced fracture of left femoral neck on 12/06/23. She was then referred to Dr. Rojo after the screws began to backout with separation of the femoral shaft from the head. Due to this, Dr. Rojo performed a left hip conversion of hip pinning to hemiarthroplasty with a posterolateral approach on 01/03/24.    IMAGING:  Imaging read by myself and interpreted as follows:    01/16/2024:  3 view x-ray of the left hip including AP pelvis, AP and lateral demonstrates a well positioned left hemiarthroplasty with cemented components without evidence of loosening or fracture when compared to the post operative films take at [facility] on 01/03/23. Poor bone mineralization evident on films consistent with the patient's osteoporosis diagnosis. Left leg appears to be about 8mm shorter than the right.     12/28/2023:  2 view x-ray of left hip shows interval displacement at the fracture site and hardware x3 appears to be

## 2024-05-24 ENCOUNTER — HOSPITAL ENCOUNTER (OUTPATIENT)
Facility: HOSPITAL | Age: 69
End: 2024-05-24
Payer: MEDICARE

## 2024-05-24 DIAGNOSIS — Z78.0 POST-MENOPAUSAL: ICD-10-CM

## 2024-05-24 PROCEDURE — 77080 DXA BONE DENSITY AXIAL: CPT

## 2024-09-24 ENCOUNTER — OFFICE VISIT (OUTPATIENT)
Age: 69
End: 2024-09-24
Payer: MEDICARE

## 2024-09-24 DIAGNOSIS — Z96.649 STATUS POST HIP HEMIARTHROPLASTY: Primary | ICD-10-CM

## 2024-09-24 DIAGNOSIS — Z86.73 HISTORY OF CVA (CEREBROVASCULAR ACCIDENT) WITHOUT RESIDUAL DEFICITS: ICD-10-CM

## 2024-09-24 DIAGNOSIS — S72.002A CLOSED DISPLACED FRACTURE OF LEFT FEMORAL NECK (HCC): ICD-10-CM

## 2024-09-24 PROCEDURE — G8399 PT W/DXA RESULTS DOCUMENT: HCPCS | Performed by: ORTHOPAEDIC SURGERY

## 2024-09-24 PROCEDURE — 3017F COLORECTAL CA SCREEN DOC REV: CPT | Performed by: ORTHOPAEDIC SURGERY

## 2024-09-24 PROCEDURE — G8428 CUR MEDS NOT DOCUMENT: HCPCS | Performed by: ORTHOPAEDIC SURGERY

## 2024-09-24 PROCEDURE — 99213 OFFICE O/P EST LOW 20 MIN: CPT | Performed by: ORTHOPAEDIC SURGERY

## 2024-09-24 PROCEDURE — 1123F ACP DISCUSS/DSCN MKR DOCD: CPT | Performed by: ORTHOPAEDIC SURGERY

## 2024-09-24 PROCEDURE — 1090F PRES/ABSN URINE INCON ASSESS: CPT | Performed by: ORTHOPAEDIC SURGERY

## 2024-09-24 PROCEDURE — 73502 X-RAY EXAM HIP UNI 2-3 VIEWS: CPT | Performed by: ORTHOPAEDIC SURGERY

## 2024-09-24 PROCEDURE — G8417 CALC BMI ABV UP PARAM F/U: HCPCS | Performed by: ORTHOPAEDIC SURGERY

## 2024-09-24 PROCEDURE — 1036F TOBACCO NON-USER: CPT | Performed by: ORTHOPAEDIC SURGERY

## (undated) DEVICE — PREMIUM DRY TRAY LF: Brand: MEDLINE INDUSTRIES, INC.

## (undated) DEVICE — GAUZE,SPONGE,4"X4",16PLY,STRL,LF,10/TRAY: Brand: MEDLINE

## (undated) DEVICE — APPLICATOR MEDICATED 26 CC SOLUTION HI LT ORNG CHLORAPREP

## (undated) DEVICE — SOLUTION IRRIG 3000ML 0.9% SOD CHL FLX CONT 0797208] ICU MEDICAL INC]

## (undated) DEVICE — SUTURE ETHBND 5 L20IN NONABSORBABLE GRN LR L75MM 3/8 CIR B409T

## (undated) DEVICE — STRYKER PERFORMANCE SERIES SAGITTAL BLADE: Brand: STRYKER PERFORMANCE SERIES

## (undated) DEVICE — HOOD WITH PEEL AWAY FACE SHIELD: Brand: T7PLUS

## (undated) DEVICE — PREP IM ENCHANCED TOTAL HIP BONE                                    PREPARATION KIT: Brand: PREP-IM

## (undated) DEVICE — SOLUTION IRRIG 1000ML 0.9% SOD CHL USP POUR PLAS BTL

## (undated) DEVICE — TRAY,URINE METER,100% SILICONE,16FR10ML: Brand: MEDLINE

## (undated) DEVICE — BLADE OPHTH GRN ROUNDED TIP 1 SIDE SHRP GRINDLESS MINI-BLDE

## (undated) DEVICE — GLOVE SURG SZ 85 L12IN FNGR THK79MIL GRN LTX FREE

## (undated) DEVICE — GUIDEWIRE ORTH L300MM DIA2.8MM S STL THRD SHRP TRCR TIP FOR

## (undated) DEVICE — SUIT SURG ISOLATN ZIP TOGA 2XL W/ PEELWY LENS T7 +

## (undated) DEVICE — LIMB HOLDER, WRIST/ANKLE: Brand: DEROYAL

## (undated) DEVICE — ELECTRODE PT RET AD L9FT HI MOIST COND ADH HYDRGEL CORDED

## (undated) DEVICE — LIQUIBAND RAPID ADHESIVE 36/CS 0.8ML: Brand: MEDLINE

## (undated) DEVICE — TAPE,CLOTH/SILK,CURAD,3"X10YD,LF,40/CS: Brand: CURAD

## (undated) DEVICE — 1010 S-DRAPE TOWEL DRAPE 10/BX: Brand: STERI-DRAPE™

## (undated) DEVICE — KIT OR TURNOVER

## (undated) DEVICE — INTENDED FOR TISSUE SEPARATION, AND OTHER PROCEDURES THAT REQUIRE A SHARP SURGICAL BLADE TO PUNCTURE OR CUT.: Brand: BARD-PARKER ®  SAFETY SCALPED

## (undated) DEVICE — 2DSM19 2-0 UND MONODERM 30X30: Brand: 2DSM19 2-0 UND MONODERM 30X30

## (undated) DEVICE — DRAPE,HIP,W/POUCHES,STERILE: Brand: MEDLINE

## (undated) DEVICE — 3M™ STERI-DRAPE™ U-DRAPE 1015: Brand: STERI-DRAPE™

## (undated) DEVICE — GLOVE SURG SZ 8 CRM LTX FREE POLYISOPRENE POLYMER BEAD ANTI

## (undated) DEVICE — 4-PORT MANIFOLD: Brand: NEPTUNE 2

## (undated) DEVICE — SUTURE MCRYL SZ 2-0 L36IN ABSRB UD L36MM CT-1 1/2 CIR Y945H

## (undated) DEVICE — GOWN,PREVENTION PLUS,XLN/XL,ST,24/CS: Brand: MEDLINE

## (undated) DEVICE — BLANKET WRM W29.9XL79.1IN UP BODY FORC AIR MISTRAL-AIR

## (undated) DEVICE — BOWL AND CEMENT CARTRIDGE WITH BREAKAWAY FEMORAL NOZZLE: Brand: ACM

## (undated) DEVICE — SUTURE ABSORBABLE BRAIDED 2-0 CT-1 27 IN UD VICRYL J259H

## (undated) DEVICE — INTENDED FOR TISSUE SEPARATION, AND OTHER PROCEDURES THAT REQUIRE A SHARP SURGICAL BLADE TO PUNCTURE OR CUT.: Brand: BARD-PARKER SAFETY BLADES SIZE 10, STERILE

## (undated) DEVICE — IMPLANTABLE DEVICE
Type: IMPLANTABLE DEVICE | Site: HIP | Status: NON-FUNCTIONAL
Removed: 2023-12-06

## (undated) DEVICE — PILLOW ABDUCTN MED 23X15X6 IN HIP SFT CONTACT CLOSURE

## (undated) DEVICE — HANDPIECE SET WITH HIGH FLOW TIP AND SUCTION TUBE: Brand: INTERPULSE

## (undated) DEVICE — FEMORAL CANAL BRUSH, IRRIGATION/SUCTION

## (undated) DEVICE — PILLOW POS W15XH6XL22IN RASPBERRY FOAM ABD W/ STRP DISP FOR

## (undated) DEVICE — SUTURE VCRL SZ 0 L36IN ABSRB UD L36MM CT-1 1/2 CIR J946H

## (undated) DEVICE — NEEDLE SPINAL L3.5IN OD20GA SPINOCAN NRFIT

## (undated) DEVICE — SUTURE VCRL + SZ 2-0 L36IN ABSRB UD L36MM CT-1 1/2 CIR VCP945H

## (undated) DEVICE — GAUZE PK VAGINALXRAY DTECT 2INX15FT

## (undated) DEVICE — SUTURE VCRL SZ 1 L18IN ABSRB VLT CT-1 L36MM 1/2 CIR J741D

## (undated) DEVICE — INTENDED FOR TISSUE SEPARATION, AND OTHER PROCEDURES THAT REQUIRE A SHARP SURGICAL BLADE TO PUNCTURE OR CUT.: Brand: BARD-PARKER ® CARBON RIB-BACK BLADES

## (undated) DEVICE — PACK PROCEDURE SURG TOT HIP BSHR LF

## (undated) DEVICE — 3M™ MEDIPORE™ H SOFT CLOTH SURGICAL TAPE 2864, 4 INCH X 10 YARD (10CM X 9,14M), 12 ROLLS/CASE: Brand: 3M™ MEDIPORE™

## (undated) DEVICE — SYRINGE MED 30ML STD CLR PLAS LUERLOCK TIP N CTRL DISP

## (undated) DEVICE — Device

## (undated) DEVICE — DRESSING ANTIMIC FOAM MEPILEX BORD POSTOP AG PROD SZ 4X12 IN

## (undated) DEVICE — DRESSING HYDROFIBER AQUACEL AG ADVANTAGE 3.5X10 IN

## (undated) DEVICE — SIGMOIDOSCOPIC SUCTION INSTRUMENT 18 FR W/WINGED CAP CONTROL AND 6 FOOT (1.8M) TUBING: Brand: SIGMOIDOSCOPIC

## (undated) DEVICE — 2C14 #2 PDO 36 X 36: Brand: 2C14 #2 PDO 36 X 36

## (undated) DEVICE — DRESSING FOAM POST OPERATIVE 4X10 IN MEPILEX BORDER AG

## (undated) DEVICE — SUTURE ETHBND EXCEL SZ 5 L30IN NONABSORBABLE GRN L40MM V-37 MB66G